# Patient Record
Sex: FEMALE | Race: WHITE | NOT HISPANIC OR LATINO | Employment: OTHER | URBAN - NONMETROPOLITAN AREA
[De-identification: names, ages, dates, MRNs, and addresses within clinical notes are randomized per-mention and may not be internally consistent; named-entity substitution may affect disease eponyms.]

---

## 2019-01-01 ENCOUNTER — READMISSION MANAGEMENT (OUTPATIENT)
Dept: CALL CENTER | Facility: HOSPITAL | Age: 84
End: 2019-01-01

## 2019-01-01 ENCOUNTER — APPOINTMENT (OUTPATIENT)
Dept: CARDIOLOGY | Facility: HOSPITAL | Age: 84
End: 2019-01-01

## 2019-01-01 ENCOUNTER — APPOINTMENT (OUTPATIENT)
Dept: MRI IMAGING | Facility: HOSPITAL | Age: 84
End: 2019-01-01

## 2019-01-01 ENCOUNTER — HOSPITAL ENCOUNTER (INPATIENT)
Facility: HOSPITAL | Age: 84
LOS: 7 days | Discharge: NURSING, REHAB OR LONG-TERM CARE | End: 2019-07-12
Attending: THORACIC SURGERY (CARDIOTHORACIC VASCULAR SURGERY) | Admitting: THORACIC SURGERY (CARDIOTHORACIC VASCULAR SURGERY)

## 2019-01-01 ENCOUNTER — OFFICE VISIT (OUTPATIENT)
Dept: CARDIAC SURGERY | Facility: CLINIC | Age: 84
End: 2019-01-01

## 2019-01-01 ENCOUNTER — APPOINTMENT (OUTPATIENT)
Dept: GENERAL RADIOLOGY | Facility: HOSPITAL | Age: 84
End: 2019-01-01

## 2019-01-01 ENCOUNTER — APPOINTMENT (OUTPATIENT)
Dept: CT IMAGING | Facility: HOSPITAL | Age: 84
End: 2019-01-01

## 2019-01-01 ENCOUNTER — ANESTHESIA (OUTPATIENT)
Dept: PERIOP | Facility: HOSPITAL | Age: 84
End: 2019-01-01

## 2019-01-01 ENCOUNTER — NURSE TRIAGE (OUTPATIENT)
Dept: CALL CENTER | Facility: HOSPITAL | Age: 84
End: 2019-01-01

## 2019-01-01 ENCOUNTER — HOSPITAL ENCOUNTER (EMERGENCY)
Facility: HOSPITAL | Age: 84
Discharge: HOME | End: 2019-10-20
Attending: EMERGENCY MEDICINE | Admitting: EMERGENCY MEDICINE

## 2019-01-01 ENCOUNTER — HOSPITAL ENCOUNTER (INPATIENT)
Facility: HOSPITAL | Age: 84
LOS: 1 days | Discharge: HOME | End: 2019-08-11
Attending: EMERGENCY MEDICINE | Admitting: INTERNAL MEDICINE

## 2019-01-01 ENCOUNTER — HOSPITAL ENCOUNTER (EMERGENCY)
Facility: HOSPITAL | Age: 84
Discharge: HOME | End: 2019-07-05
Attending: EMERGENCY MEDICINE | Admitting: EMERGENCY MEDICINE

## 2019-01-01 ENCOUNTER — ANESTHESIA EVENT (OUTPATIENT)
Dept: PERIOP | Facility: HOSPITAL | Age: 84
End: 2019-01-01

## 2019-01-01 VITALS
WEIGHT: 160 LBS | RESPIRATION RATE: 18 BRPM | HEIGHT: 61 IN | OXYGEN SATURATION: 93 % | TEMPERATURE: 97.7 F | BODY MASS INDEX: 30.21 KG/M2 | DIASTOLIC BLOOD PRESSURE: 80 MMHG | SYSTOLIC BLOOD PRESSURE: 179 MMHG | HEART RATE: 84 BPM

## 2019-01-01 VITALS
TEMPERATURE: 97.5 F | HEART RATE: 66 BPM | OXYGEN SATURATION: 94 % | WEIGHT: 145 LBS | DIASTOLIC BLOOD PRESSURE: 75 MMHG | SYSTOLIC BLOOD PRESSURE: 145 MMHG | BODY MASS INDEX: 25.69 KG/M2 | RESPIRATION RATE: 14 BRPM | HEIGHT: 63 IN

## 2019-01-01 VITALS
OXYGEN SATURATION: 99 % | HEIGHT: 62 IN | HEART RATE: 57 BPM | DIASTOLIC BLOOD PRESSURE: 67 MMHG | TEMPERATURE: 99 F | WEIGHT: 160 LBS | BODY MASS INDEX: 29.44 KG/M2 | SYSTOLIC BLOOD PRESSURE: 146 MMHG

## 2019-01-01 VITALS
BODY MASS INDEX: 28.41 KG/M2 | WEIGHT: 154.4 LBS | HEART RATE: 71 BPM | HEIGHT: 62 IN | RESPIRATION RATE: 18 BRPM | DIASTOLIC BLOOD PRESSURE: 59 MMHG | SYSTOLIC BLOOD PRESSURE: 172 MMHG | OXYGEN SATURATION: 95 % | TEMPERATURE: 97.7 F

## 2019-01-01 VITALS
HEIGHT: 63 IN | BODY MASS INDEX: 28.35 KG/M2 | RESPIRATION RATE: 16 BRPM | SYSTOLIC BLOOD PRESSURE: 171 MMHG | TEMPERATURE: 98 F | OXYGEN SATURATION: 97 % | DIASTOLIC BLOOD PRESSURE: 85 MMHG | WEIGHT: 160 LBS | HEART RATE: 55 BPM

## 2019-01-01 DIAGNOSIS — R20.2 LEFT HAND PARESTHESIA: ICD-10-CM
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
## 2019-01-01 DIAGNOSIS — N17.9 ACUTE RENAL FAILURE, UNSPECIFIED ACUTE RENAL FAILURE TYPE (HCC): Primary | ICD-10-CM

## 2019-01-01 DIAGNOSIS — Z74.09 IMPAIRED FUNCTIONAL MOBILITY, BALANCE, GAIT, AND ENDURANCE: ICD-10-CM

## 2019-01-01 DIAGNOSIS — N28.9 RENAL INSUFFICIENCY: ICD-10-CM

## 2019-01-01 DIAGNOSIS — Z78.9 IMPAIRED MOBILITY AND ADLS: ICD-10-CM

## 2019-01-01 DIAGNOSIS — I10 ELEVATED BLOOD PRESSURE READING WITH DIAGNOSIS OF HYPERTENSION: ICD-10-CM

## 2019-01-01 DIAGNOSIS — Z74.09 IMPAIRED MOBILITY AND ADLS: ICD-10-CM

## 2019-01-01 DIAGNOSIS — S09.93XA DENTAL TRAUMA, INITIAL ENCOUNTER: ICD-10-CM

## 2019-01-01 DIAGNOSIS — I73.9 CLAUDICATION OF RIGHT LOWER EXTREMITY (HCC): ICD-10-CM

## 2019-01-01 DIAGNOSIS — I99.8 ISCHEMIC LEG: Primary | ICD-10-CM

## 2019-01-01 DIAGNOSIS — S01.81XA FOREHEAD LACERATION, INITIAL ENCOUNTER: ICD-10-CM

## 2019-01-01 DIAGNOSIS — Z79.02 ANTIPLATELET OR ANTITHROMBOTIC LONG-TERM USE: ICD-10-CM
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
## 2019-01-01 DIAGNOSIS — M54.5 LOW BACK PAIN, UNSPECIFIED BACK PAIN LATERALITY, UNSPECIFIED CHRONICITY, WITH SCIATICA PRESENCE UNSPECIFIED: ICD-10-CM

## 2019-01-01 DIAGNOSIS — D64.9 ANEMIA, UNSPECIFIED TYPE: ICD-10-CM

## 2019-01-01 DIAGNOSIS — M79.604 RIGHT LEG PAIN: ICD-10-CM

## 2019-01-01 DIAGNOSIS — I70.209 ARTERIAL OCCLUSION, LOWER EXTREMITY (HCC): Primary | ICD-10-CM

## 2019-01-01 DIAGNOSIS — S00.83XA FACIAL CONTUSION, INITIAL ENCOUNTER: ICD-10-CM

## 2019-01-01 LAB
ABO + RH BLD: NORMAL
ABO GROUP BLD: NORMAL
ABO GROUP BLD: NORMAL
ALBUMIN SERPL-MCNC: 2.4 G/DL (ref 3.5–5.2)
ALBUMIN SERPL-MCNC: 3 G/DL (ref 3.5–5.2)
ALBUMIN SERPL-MCNC: 3.3 G/DL (ref 3.5–5.2)
ALBUMIN SERPL-MCNC: 3.5 G/DL (ref 3.5–5.2)
ALBUMIN SERPL-MCNC: 3.7 G/DL (ref 3.5–5.2)
ALBUMIN SERPL-MCNC: 3.9 G/DL (ref 3.5–5.2)
ALBUMIN/GLOB SERPL: 0.8 G/DL
ALBUMIN/GLOB SERPL: 1 G/DL
ALBUMIN/GLOB SERPL: 1.3 G/DL
ALBUMIN/GLOB SERPL: 1.3 G/DL
ALP SERPL-CCNC: 74 U/L (ref 39–117)
ALP SERPL-CCNC: 81 U/L (ref 39–117)
ALP SERPL-CCNC: 81 U/L (ref 39–117)
ALP SERPL-CCNC: 85 U/L (ref 39–117)
ALT SERPL W P-5'-P-CCNC: 24 U/L (ref 1–33)
ALT SERPL W P-5'-P-CCNC: 25 U/L (ref 1–33)
ALT SERPL W P-5'-P-CCNC: 7 U/L (ref 1–33)
ALT SERPL W P-5'-P-CCNC: 9 U/L (ref 1–33)
AMYLASE SERPL-CCNC: 196 U/L (ref 28–100)
ANION GAP SERPL CALCULATED.3IONS-SCNC: 12 MMOL/L (ref 5–15)
ANION GAP SERPL CALCULATED.3IONS-SCNC: 12 MMOL/L (ref 5–15)
ANION GAP SERPL CALCULATED.3IONS-SCNC: 13 MMOL/L (ref 5–15)
ANION GAP SERPL CALCULATED.3IONS-SCNC: 13 MMOL/L (ref 5–15)
ANION GAP SERPL CALCULATED.3IONS-SCNC: 14 MMOL/L (ref 5–15)
ANION GAP SERPL CALCULATED.3IONS-SCNC: 17 MMOL/L (ref 5–15)
APTT PPP: 27 SECONDS (ref 24–37)
AST SERPL-CCNC: 10 U/L (ref 1–32)
AST SERPL-CCNC: 15 U/L (ref 1–32)
AST SERPL-CCNC: 39 U/L (ref 1–32)
AST SERPL-CCNC: 47 U/L (ref 1–32)
BACTERIA SPEC AEROBE CULT: NORMAL
BACTERIA UR QL AUTO: ABNORMAL /HPF
BACTERIA UR QL AUTO: ABNORMAL /HPF
BASOPHILS # BLD AUTO: 0.02 10*3/MM3 (ref 0–0.2)
BASOPHILS # BLD AUTO: 0.03 10*3/MM3 (ref 0–0.2)
BASOPHILS # BLD AUTO: 0.06 10*3/MM3 (ref 0–0.2)
BASOPHILS NFR BLD AUTO: 0.1 % (ref 0–1.5)
BASOPHILS NFR BLD AUTO: 0.2 % (ref 0–1.5)
BASOPHILS NFR BLD AUTO: 0.3 % (ref 0–1.5)
BASOPHILS NFR BLD AUTO: 0.3 % (ref 0–1.5)
BASOPHILS NFR BLD AUTO: 0.8 % (ref 0–1.5)
BH BB BLOOD EXPIRATION DATE: NORMAL
BH BB BLOOD TYPE BARCODE: 6200
BH BB DISPENSE STATUS: NORMAL
BH BB PRODUCT CODE: NORMAL
BH BB UNIT NUMBER: NORMAL
BH CV ECHO MEAS - AO MAX PG (FULL): 9.3 MMHG
BH CV ECHO MEAS - AO MAX PG: 14.3 MMHG
BH CV ECHO MEAS - AO MEAN PG (FULL): 4.5 MMHG
BH CV ECHO MEAS - AO MEAN PG: 6.8 MMHG
BH CV ECHO MEAS - AO ROOT AREA (BSA CORRECTED): 1.6
BH CV ECHO MEAS - AO ROOT AREA: 5.8 CM^2
BH CV ECHO MEAS - AO ROOT DIAM: 2.7 CM
BH CV ECHO MEAS - AO V2 MAX: 189.1 CM/SEC
BH CV ECHO MEAS - AO V2 MEAN: 122.5 CM/SEC
BH CV ECHO MEAS - AO V2 VTI: 36.7 CM
BH CV ECHO MEAS - AVA(I,A): 1.7 CM^2
BH CV ECHO MEAS - AVA(I,D): 1.7 CM^2
BH CV ECHO MEAS - AVA(V,A): 1.6 CM^2
BH CV ECHO MEAS - AVA(V,D): 1.6 CM^2
BH CV ECHO MEAS - BSA(HAYCOCK): 1.8 M^2
BH CV ECHO MEAS - BSA: 1.7 M^2
BH CV ECHO MEAS - BSA: 1.7 M^2
BH CV ECHO MEAS - BSA: 1.8 M^2
BH CV ECHO MEAS - BZI_BMI: 28.3 KILOGRAMS/M^2
BH CV ECHO MEAS - BZI_BMI: 30.2 KILOGRAMS/M^2
BH CV ECHO MEAS - BZI_BMI: 30.2 KILOGRAMS/M^2
BH CV ECHO MEAS - BZI_METRIC_HEIGHT: 154.9 CM
BH CV ECHO MEAS - BZI_METRIC_HEIGHT: 154.9 CM
BH CV ECHO MEAS - BZI_METRIC_HEIGHT: 160 CM
BH CV ECHO MEAS - BZI_METRIC_WEIGHT: 72.6 KG
BH CV ECHO MEAS - EDV(CUBED): 65.7 ML
BH CV ECHO MEAS - EDV(MOD-SP4): 30 ML
BH CV ECHO MEAS - EDV(TEICH): 71.5 ML
BH CV ECHO MEAS - EF(CUBED): 65.1 %
BH CV ECHO MEAS - EF(MOD-BP): 60 %
BH CV ECHO MEAS - EF(MOD-SP4): 60 %
BH CV ECHO MEAS - EF(TEICH): 57.1 %
BH CV ECHO MEAS - ESV(CUBED): 22.9 ML
BH CV ECHO MEAS - ESV(MOD-SP4): 12 ML
BH CV ECHO MEAS - ESV(TEICH): 30.6 ML
BH CV ECHO MEAS - FS: 29.6 %
BH CV ECHO MEAS - IVS/LVPW: 1
BH CV ECHO MEAS - IVSD: 0.91 CM
BH CV ECHO MEAS - LA DIMENSION: 3.9 CM
BH CV ECHO MEAS - LA/AO: 1.4
BH CV ECHO MEAS - LAD MAJOR: 6.4 CM
BH CV ECHO MEAS - LAT PEAK E' VEL: 6.3 CM/SEC
BH CV ECHO MEAS - LATERAL E/E' RATIO: 17.8
BH CV ECHO MEAS - LV DIASTOLIC VOL/BSA (35-75): 17.5 ML/M^2
BH CV ECHO MEAS - LV MASS(C)D: 112.5 GRAMS
BH CV ECHO MEAS - LV MASS(C)DI: 65.5 GRAMS/M^2
BH CV ECHO MEAS - LV MAX PG: 5 MMHG
BH CV ECHO MEAS - LV MEAN PG: 2.3 MMHG
BH CV ECHO MEAS - LV SYSTOLIC VOL/BSA (12-30): 7 ML/M^2
BH CV ECHO MEAS - LV V1 MAX: 112 CM/SEC
BH CV ECHO MEAS - LV V1 MEAN: 70.9 CM/SEC
BH CV ECHO MEAS - LV V1 VTI: 23.6 CM
BH CV ECHO MEAS - LVIDD: 4 CM
BH CV ECHO MEAS - LVIDS: 2.8 CM
BH CV ECHO MEAS - LVLD AP4: 5.8 CM
BH CV ECHO MEAS - LVLS AP4: 5 CM
BH CV ECHO MEAS - LVOT AREA (M): 2.8 CM^2
BH CV ECHO MEAS - LVOT AREA: 2.7 CM^2
BH CV ECHO MEAS - LVOT DIAM: 1.9 CM
BH CV ECHO MEAS - LVPWD: 0.91 CM
BH CV ECHO MEAS - MED PEAK E' VEL: 6.5 CM/SEC
BH CV ECHO MEAS - MEDIAL E/E' RATIO: 17.2
BH CV ECHO MEAS - MV A MAX VEL: 117.2 CM/SEC
BH CV ECHO MEAS - MV DEC TIME: 0.25 SEC
BH CV ECHO MEAS - MV E MAX VEL: 113.5 CM/SEC
BH CV ECHO MEAS - MV E/A: 0.97
BH CV ECHO MEAS - MV MAX PG: 7.2 MMHG
BH CV ECHO MEAS - MV MEAN PG: 2.6 MMHG
BH CV ECHO MEAS - MV V2 MAX: 133.8 CM/SEC
BH CV ECHO MEAS - MV V2 MEAN: 74.2 CM/SEC
BH CV ECHO MEAS - MV V2 VTI: 50.4 CM
BH CV ECHO MEAS - MVA(VTI): 1.3 CM^2
BH CV ECHO MEAS - PA ACC SLOPE: 729.3 CM/SEC^2
BH CV ECHO MEAS - PA ACC TIME: 0.11 SEC
BH CV ECHO MEAS - PA MAX PG: 4 MMHG
BH CV ECHO MEAS - PA PR(ACCEL): 29.9 MMHG
BH CV ECHO MEAS - PA V2 MAX: 98.8 CM/SEC
BH CV ECHO MEAS - RAP SYSTOLE: 8 MMHG
BH CV ECHO MEAS - RVSP: 38 MMHG
BH CV ECHO MEAS - SI(AO): 123 ML/M^2
BH CV ECHO MEAS - SI(CUBED): 24.9 ML/M^2
BH CV ECHO MEAS - SI(LVOT): 36.9 ML/M^2
BH CV ECHO MEAS - SI(MOD-SP4): 10.5 ML/M^2
BH CV ECHO MEAS - SI(TEICH): 23.8 ML/M^2
BH CV ECHO MEAS - SV(AO): 211.4 ML
BH CV ECHO MEAS - SV(CUBED): 42.7 ML
BH CV ECHO MEAS - SV(LVOT): 63.4 ML
BH CV ECHO MEAS - SV(MOD-SP4): 18 ML
BH CV ECHO MEAS - SV(TEICH): 40.8 ML
BH CV ECHO MEAS - TAPSE (>1.6): 2.3 CM2
BH CV ECHO MEAS - TR MAX PG: 30 MMHG
BH CV ECHO MEAS - TR MAX VEL: 273.5 CM/SEC
BH CV ECHO MEASUREMENTS AVERAGE E/E' RATIO: 17.73
BH CV GRAFT BRACHIAL PRESSURE LEFT: 180 MMHG
BH CV GRAFT BRACHIAL PRESSURE RIGHT: 123 MMHG
BH CV GRAFT BRACHIAL PRESSURE RIGHT: 181 MMHG
BH CV LEA LEFT CFA PROX PSV: 419 CM/S
BH CV LEA LEFT PTA PRESSURE: 180 MMHG
BH CV LEA RIGHT ANT TIBIAL A DISTAL PSV: 0 CM/S
BH CV LEA RIGHT ANT TIBIAL A DISTAL PSV: 24.8 CM/S
BH CV LEA RIGHT ANT TIBIAL A MID PSV: 0 CM/S
BH CV LEA RIGHT ANT TIBIAL A MID PSV: 29.9 CM/S
BH CV LEA RIGHT ANT TIBIAL A PROX PSV: 0 CM/S
BH CV LEA RIGHT ANT TIBIAL A PROX PSV: 62.1 CM/S
BH CV LEA RIGHT CFA DISTAL EDV: 48.9 CM/S
BH CV LEA RIGHT CFA DISTAL PSV: 382 CM/S
BH CV LEA RIGHT CFA PROX EDV: 17 CM/S
BH CV LEA RIGHT CFA PROX PSV: 166 CM/S
BH CV LEA RIGHT CFA PROX PSV: 44 CM/S
BH CV LEA RIGHT DFA PROX EDV: 18 CM/S
BH CV LEA RIGHT DFA PROX PSV: 349 CM/S
BH CV LEA RIGHT DFA PROX PSV: 86 CM/S
BH CV LEA RIGHT DPA PRESSURE: 0 MMHG
BH CV LEA RIGHT DPA PRESSURE: 50 MMHG
BH CV LEA RIGHT PERONEAL  DISTAL PSV: 0 CM/S
BH CV LEA RIGHT PERONEAL  MID PSV: 0 CM/S
BH CV LEA RIGHT PERONEAL  MID PSV: 16.8 CM/S
BH CV LEA RIGHT PERONEAL  PROX PSV: 0 CM/S
BH CV LEA RIGHT POPITEAL A  DISTAL PSV: 0 CM/S
BH CV LEA RIGHT POPITEAL A  DISTAL PSV: 18.2 CM/S
BH CV LEA RIGHT POPITEAL A  MID PSV: 0 CM/S
BH CV LEA RIGHT POPITEAL A  PROX PSV: 0 CM/S
BH CV LEA RIGHT PTA DISTAL PSV: 0 CM/S
BH CV LEA RIGHT PTA DISTAL PSV: 26.3 CM/S
BH CV LEA RIGHT PTA MID PSV: 0 CM/S
BH CV LEA RIGHT PTA MID PSV: 30.8 CM/S
BH CV LEA RIGHT PTA PRESSURE: 0 MMHG
BH CV LEA RIGHT PTA PRESSURE: 62 MMHG
BH CV LEA RIGHT PTA PROX PSV: 0 CM/S
BH CV LEA RIGHT PTA PROX PSV: 32.1 CM/S
BH CV LEA RIGHT SFA DISTAL PSV: 0 CM/S
BH CV LEA RIGHT SFA MID PSV: 0 CM/S
BH CV LEA RIGHT SFA PROX EDV: 13 CM/S
BH CV LEA RIGHT SFA PROX PSV: 225 CM/S
BH CV LEA RIGHT SFA PROX PSV: 23 CM/S
BH CV LEA RIGHT TIBEOPERONEAL PSV: 0 CM/S
BH CV LOW VAS RIGHT GREATER SAPH AK VESSEL: 1
BH CV LOWER ARTERIAL LEFT ABI RATIO: 1
BH CV LOWER ARTERIAL RIGHT ABI RATIO: 0
BH CV LOWER ARTERIAL RIGHT ABI RATIO: 0.5
BH CV LOWER VASCULAR LEFT COMMON FEMORAL AUGMENT: NORMAL
BH CV LOWER VASCULAR LEFT COMMON FEMORAL AUGMENT: NORMAL
BH CV LOWER VASCULAR LEFT COMMON FEMORAL COMPRESS: NORMAL
BH CV LOWER VASCULAR LEFT COMMON FEMORAL COMPRESS: NORMAL
BH CV LOWER VASCULAR LEFT COMMON FEMORAL PHASIC: NORMAL
BH CV LOWER VASCULAR LEFT COMMON FEMORAL PHASIC: NORMAL
BH CV LOWER VASCULAR LEFT COMMON FEMORAL SPONT: NORMAL
BH CV LOWER VASCULAR LEFT COMMON FEMORAL SPONT: NORMAL
BH CV LOWER VASCULAR LEFT SAPHENOFEMORAL JUNCTION AUGMENT: NORMAL
BH CV LOWER VASCULAR LEFT SAPHENOFEMORAL JUNCTION COMPRESS: NORMAL
BH CV LOWER VASCULAR LEFT SAPHENOFEMORAL JUNCTION PHASIC: NORMAL
BH CV LOWER VASCULAR LEFT SAPHENOFEMORAL JUNCTION SPONT: NORMAL
BH CV LOWER VASCULAR RIGHT COMMON FEMORAL AUGMENT: NORMAL
BH CV LOWER VASCULAR RIGHT COMMON FEMORAL AUGMENT: NORMAL
BH CV LOWER VASCULAR RIGHT COMMON FEMORAL COMPRESS: NORMAL
BH CV LOWER VASCULAR RIGHT COMMON FEMORAL COMPRESS: NORMAL
BH CV LOWER VASCULAR RIGHT COMMON FEMORAL PHASIC: NORMAL
BH CV LOWER VASCULAR RIGHT COMMON FEMORAL PHASIC: NORMAL
BH CV LOWER VASCULAR RIGHT COMMON FEMORAL SPONT: NORMAL
BH CV LOWER VASCULAR RIGHT COMMON FEMORAL SPONT: NORMAL
BH CV LOWER VASCULAR RIGHT DISTAL FEMORAL AUGMENT: NORMAL
BH CV LOWER VASCULAR RIGHT DISTAL FEMORAL AUGMENT: NORMAL
BH CV LOWER VASCULAR RIGHT DISTAL FEMORAL COMPRESS: NORMAL
BH CV LOWER VASCULAR RIGHT DISTAL FEMORAL COMPRESS: NORMAL
BH CV LOWER VASCULAR RIGHT DISTAL FEMORAL PHASIC: NORMAL
BH CV LOWER VASCULAR RIGHT DISTAL FEMORAL PHASIC: NORMAL
BH CV LOWER VASCULAR RIGHT DISTAL FEMORAL SPONT: NORMAL
BH CV LOWER VASCULAR RIGHT DISTAL FEMORAL SPONT: NORMAL
BH CV LOWER VASCULAR RIGHT GASTRONEMIUS COMPRESS: NORMAL
BH CV LOWER VASCULAR RIGHT GASTRONEMIUS COMPRESS: NORMAL
BH CV LOWER VASCULAR RIGHT GREATER SAPH AK COMPRESS: NORMAL
BH CV LOWER VASCULAR RIGHT GREATER SAPH AK COMPRESS: NORMAL
BH CV LOWER VASCULAR RIGHT GREATER SAPH BK COMPRESS: NORMAL
BH CV LOWER VASCULAR RIGHT GREATER SAPH BK COMPRESS: NORMAL
BH CV LOWER VASCULAR RIGHT LESSER SAPH COMPRESS: NORMAL
BH CV LOWER VASCULAR RIGHT LESSER SAPH COMPRESS: NORMAL
BH CV LOWER VASCULAR RIGHT MID FEMORAL AUGMENT: NORMAL
BH CV LOWER VASCULAR RIGHT MID FEMORAL AUGMENT: NORMAL
BH CV LOWER VASCULAR RIGHT MID FEMORAL COMPRESS: NORMAL
BH CV LOWER VASCULAR RIGHT MID FEMORAL COMPRESS: NORMAL
BH CV LOWER VASCULAR RIGHT MID FEMORAL PHASIC: NORMAL
BH CV LOWER VASCULAR RIGHT MID FEMORAL PHASIC: NORMAL
BH CV LOWER VASCULAR RIGHT MID FEMORAL SPONT: NORMAL
BH CV LOWER VASCULAR RIGHT MID FEMORAL SPONT: NORMAL
BH CV LOWER VASCULAR RIGHT PERONEAL AUGMENT: NORMAL
BH CV LOWER VASCULAR RIGHT PERONEAL AUGMENT: NORMAL
BH CV LOWER VASCULAR RIGHT PERONEAL COMPRESS: NORMAL
BH CV LOWER VASCULAR RIGHT PERONEAL COMPRESS: NORMAL
BH CV LOWER VASCULAR RIGHT POPLITEAL AUGMENT: NORMAL
BH CV LOWER VASCULAR RIGHT POPLITEAL AUGMENT: NORMAL
BH CV LOWER VASCULAR RIGHT POPLITEAL COMPRESS: NORMAL
BH CV LOWER VASCULAR RIGHT POPLITEAL COMPRESS: NORMAL
BH CV LOWER VASCULAR RIGHT POPLITEAL PHASIC: NORMAL
BH CV LOWER VASCULAR RIGHT POPLITEAL PHASIC: NORMAL
BH CV LOWER VASCULAR RIGHT POPLITEAL SPONT: NORMAL
BH CV LOWER VASCULAR RIGHT POPLITEAL SPONT: NORMAL
BH CV LOWER VASCULAR RIGHT POSTERIOR TIBIAL AUGMENT: NORMAL
BH CV LOWER VASCULAR RIGHT POSTERIOR TIBIAL AUGMENT: NORMAL
BH CV LOWER VASCULAR RIGHT POSTERIOR TIBIAL COMPRESS: NORMAL
BH CV LOWER VASCULAR RIGHT POSTERIOR TIBIAL COMPRESS: NORMAL
BH CV LOWER VASCULAR RIGHT PROFUNDA FEMORAL AUGMENT: NORMAL
BH CV LOWER VASCULAR RIGHT PROFUNDA FEMORAL AUGMENT: NORMAL
BH CV LOWER VASCULAR RIGHT PROFUNDA FEMORAL COMPRESS: NORMAL
BH CV LOWER VASCULAR RIGHT PROFUNDA FEMORAL PHASIC: NORMAL
BH CV LOWER VASCULAR RIGHT PROFUNDA FEMORAL PHASIC: NORMAL
BH CV LOWER VASCULAR RIGHT PROFUNDA FEMORAL SPONT: NORMAL
BH CV LOWER VASCULAR RIGHT PROFUNDA FEMORAL SPONT: NORMAL
BH CV LOWER VASCULAR RIGHT PROXIMAL FEMORAL AUGMENT: NORMAL
BH CV LOWER VASCULAR RIGHT PROXIMAL FEMORAL AUGMENT: NORMAL
BH CV LOWER VASCULAR RIGHT PROXIMAL FEMORAL COMPRESS: NORMAL
BH CV LOWER VASCULAR RIGHT PROXIMAL FEMORAL COMPRESS: NORMAL
BH CV LOWER VASCULAR RIGHT PROXIMAL FEMORAL PHASIC: NORMAL
BH CV LOWER VASCULAR RIGHT PROXIMAL FEMORAL PHASIC: NORMAL
BH CV LOWER VASCULAR RIGHT PROXIMAL FEMORAL SPONT: NORMAL
BH CV LOWER VASCULAR RIGHT PROXIMAL FEMORAL SPONT: NORMAL
BH CV LOWER VASCULAR RIGHT SAPHENOFEMORAL JUNCTION AUGMENT: NORMAL
BH CV LOWER VASCULAR RIGHT SAPHENOFEMORAL JUNCTION COMPRESS: NORMAL
BH CV LOWER VASCULAR RIGHT SAPHENOFEMORAL JUNCTION PHASIC: NORMAL
BH CV LOWER VASCULAR RIGHT SAPHENOFEMORAL JUNCTION SPONT: NORMAL
BH CV VAS BP LEFT ARM: NORMAL MMHG
BH CV XLRA - RV BASE: 2.7 CM
BH CV XLRA - RV LENGTH: 5.6 CM
BH CV XLRA - RV MID: 1.9 CM
BH CV XLRA - TDI S': 14.8 CM/SEC
BILIRUB SERPL-MCNC: 0.4 MG/DL (ref 0.2–1.2)
BILIRUB SERPL-MCNC: 0.4 MG/DL (ref 0.2–1.2)
BILIRUB SERPL-MCNC: 0.8 MG/DL (ref 0.2–1.2)
BILIRUB SERPL-MCNC: 0.9 MG/DL (ref 0.2–1.2)
BILIRUB UR QL STRIP: NEGATIVE
BILIRUB UR QL STRIP: NEGATIVE
BLD GP AB SCN SERPL QL: NEGATIVE
BUN BLD-MCNC: 32 MG/DL (ref 8–23)
BUN BLD-MCNC: 33 MG/DL (ref 8–23)
BUN BLD-MCNC: 36 MG/DL (ref 8–23)
BUN BLD-MCNC: 38 MG/DL (ref 8–23)
BUN BLD-MCNC: 39 MG/DL (ref 8–23)
BUN BLD-MCNC: 40 MG/DL (ref 8–23)
BUN/CREAT SERPL: 14 (ref 7–25)
BUN/CREAT SERPL: 14.2 (ref 7–25)
BUN/CREAT SERPL: 18.1 (ref 7–25)
BUN/CREAT SERPL: 19.9 (ref 7–25)
BUN/CREAT SERPL: 21.1 (ref 7–25)
BUN/CREAT SERPL: 22.5 (ref 7–25)
BUN/CREAT SERPL: 22.8 (ref 7–25)
BUN/CREAT SERPL: 24.2 (ref 7–25)
BUN/CREAT SERPL: 25.2 (ref 7–25)
CA-I SERPL ISE-MCNC: 1.29 MMOL/L (ref 1.12–1.32)
CALCIUM SPEC-SCNC: 8.4 MG/DL (ref 8.2–9.6)
CALCIUM SPEC-SCNC: 8.6 MG/DL (ref 8.2–9.6)
CALCIUM SPEC-SCNC: 8.7 MG/DL (ref 8.2–9.6)
CALCIUM SPEC-SCNC: 8.7 MG/DL (ref 8.2–9.6)
CALCIUM SPEC-SCNC: 8.9 MG/DL (ref 8.2–9.6)
CALCIUM SPEC-SCNC: 9 MG/DL (ref 8.2–9.6)
CALCIUM SPEC-SCNC: 9.1 MG/DL (ref 8.2–9.6)
CALCIUM SPEC-SCNC: 9.2 MG/DL (ref 8.2–9.6)
CALCIUM SPEC-SCNC: 9.6 MG/DL (ref 8.2–9.6)
CHLORIDE SERPL-SCNC: 100 MMOL/L (ref 98–107)
CHLORIDE SERPL-SCNC: 101 MMOL/L (ref 98–107)
CHLORIDE SERPL-SCNC: 101 MMOL/L (ref 98–107)
CHLORIDE SERPL-SCNC: 103 MMOL/L (ref 98–107)
CHLORIDE SERPL-SCNC: 104 MMOL/L (ref 98–107)
CHLORIDE SERPL-SCNC: 104 MMOL/L (ref 98–107)
CHLORIDE SERPL-SCNC: 105 MMOL/L (ref 98–107)
CHLORIDE SERPL-SCNC: 106 MMOL/L (ref 98–107)
CHLORIDE SERPL-SCNC: 110 MMOL/L (ref 98–107)
CK SERPL-CCNC: 55 U/L (ref 20–180)
CK SERPL-CCNC: 68 U/L (ref 20–180)
CLARITY UR: CLEAR
CLARITY UR: CLEAR
CO2 SERPL-SCNC: 16 MMOL/L (ref 22–29)
CO2 SERPL-SCNC: 19 MMOL/L (ref 22–29)
CO2 SERPL-SCNC: 20 MMOL/L (ref 22–29)
CO2 SERPL-SCNC: 21 MMOL/L (ref 22–29)
CO2 SERPL-SCNC: 22 MMOL/L (ref 22–29)
CO2 SERPL-SCNC: 23 MMOL/L (ref 22–29)
CO2 SERPL-SCNC: 23 MMOL/L (ref 22–29)
COLOR UR: YELLOW
COLOR UR: YELLOW
CREAT BLD-MCNC: 1.27 MG/DL (ref 0.57–1)
CREAT BLD-MCNC: 1.49 MG/DL (ref 0.57–1)
CREAT BLD-MCNC: 1.67 MG/DL (ref 0.57–1)
CREAT BLD-MCNC: 1.69 MG/DL (ref 0.57–1)
CREAT BLD-MCNC: 1.82 MG/DL (ref 0.57–1)
CREAT BLD-MCNC: 1.85 MG/DL (ref 0.57–1)
CREAT BLD-MCNC: 1.91 MG/DL (ref 0.57–1)
CREAT BLD-MCNC: 2.67 MG/DL (ref 0.57–1)
CREAT BLD-MCNC: 2.86 MG/DL (ref 0.57–1)
D-LACTATE SERPL-SCNC: 1.3 MMOL/L (ref 0.5–2)
D-LACTATE SERPL-SCNC: 1.4 MMOL/L (ref 0.5–2)
D-LACTATE SERPL-SCNC: 1.6 MMOL/L (ref 0.5–2)
D-LACTATE SERPL-SCNC: 1.8 MMOL/L (ref 0.5–2)
D-LACTATE SERPL-SCNC: 1.9 MMOL/L (ref 0.5–2)
D-LACTATE SERPL-SCNC: 1.9 MMOL/L (ref 0.5–2)
D-LACTATE SERPL-SCNC: 2.6 MMOL/L (ref 0.5–2)
D-LACTATE SERPL-SCNC: 2.7 MMOL/L (ref 0.5–2)
D-LACTATE SERPL-SCNC: 3.4 MMOL/L (ref 0.5–2)
DEPRECATED RDW RBC AUTO: 48.3 FL (ref 37–54)
DEPRECATED RDW RBC AUTO: 48.7 FL (ref 37–54)
DEPRECATED RDW RBC AUTO: 49.1 FL (ref 37–54)
DEPRECATED RDW RBC AUTO: 49.1 FL (ref 37–54)
DEPRECATED RDW RBC AUTO: 49.5 FL (ref 37–54)
DEPRECATED RDW RBC AUTO: 49.8 FL (ref 37–54)
DEPRECATED RDW RBC AUTO: 53.1 FL (ref 37–54)
DEPRECATED RDW RBC AUTO: 55.4 FL (ref 37–54)
DEPRECATED RDW RBC AUTO: 55.8 FL (ref 37–54)
EOSINOPHIL # BLD AUTO: 0.03 10*3/MM3 (ref 0–0.4)
EOSINOPHIL # BLD AUTO: 0.09 10*3/MM3 (ref 0–0.4)
EOSINOPHIL # BLD AUTO: 0.17 10*3/MM3 (ref 0–0.4)
EOSINOPHIL # BLD AUTO: 0.19 10*3/MM3 (ref 0–0.4)
EOSINOPHIL # BLD AUTO: 1.01 10*3/MM3 (ref 0–0.4)
EOSINOPHIL NFR BLD AUTO: 0.3 % (ref 0.3–6.2)
EOSINOPHIL NFR BLD AUTO: 0.6 % (ref 0.3–6.2)
EOSINOPHIL NFR BLD AUTO: 1 % (ref 0.3–6.2)
EOSINOPHIL NFR BLD AUTO: 10.3 % (ref 0.3–6.2)
EOSINOPHIL NFR BLD AUTO: 2.5 % (ref 0.3–6.2)
ERYTHROCYTE [DISTWIDTH] IN BLOOD BY AUTOMATED COUNT: 18.1 % (ref 12.3–15.4)
ERYTHROCYTE [DISTWIDTH] IN BLOOD BY AUTOMATED COUNT: 18.2 % (ref 12.3–15.4)
ERYTHROCYTE [DISTWIDTH] IN BLOOD BY AUTOMATED COUNT: 18.3 % (ref 12.3–15.4)
ERYTHROCYTE [DISTWIDTH] IN BLOOD BY AUTOMATED COUNT: 18.4 % (ref 12.3–15.4)
ERYTHROCYTE [DISTWIDTH] IN BLOOD BY AUTOMATED COUNT: 18.4 % (ref 12.3–15.4)
ERYTHROCYTE [DISTWIDTH] IN BLOOD BY AUTOMATED COUNT: 18.6 % (ref 12.3–15.4)
ERYTHROCYTE [DISTWIDTH] IN BLOOD BY AUTOMATED COUNT: 18.9 % (ref 12.3–15.4)
ERYTHROCYTE [DISTWIDTH] IN BLOOD BY AUTOMATED COUNT: 20.3 % (ref 12.3–15.4)
ERYTHROCYTE [DISTWIDTH] IN BLOOD BY AUTOMATED COUNT: 20.4 % (ref 12.3–15.4)
GFR SERPL CREATININE-BSD FRML MDRD: 15 ML/MIN/1.73
GFR SERPL CREATININE-BSD FRML MDRD: 17 ML/MIN/1.73
GFR SERPL CREATININE-BSD FRML MDRD: 25 ML/MIN/1.73
GFR SERPL CREATININE-BSD FRML MDRD: 25 ML/MIN/1.73
GFR SERPL CREATININE-BSD FRML MDRD: 26 ML/MIN/1.73
GFR SERPL CREATININE-BSD FRML MDRD: 28 ML/MIN/1.73
GFR SERPL CREATININE-BSD FRML MDRD: 29 ML/MIN/1.73
GFR SERPL CREATININE-BSD FRML MDRD: 33 ML/MIN/1.73
GFR SERPL CREATININE-BSD FRML MDRD: 39 ML/MIN/1.73
GLOBULIN UR ELPH-MCNC: 2.7 GM/DL
GLOBULIN UR ELPH-MCNC: 3.1 GM/DL
GLOBULIN UR ELPH-MCNC: 3.1 GM/DL
GLOBULIN UR ELPH-MCNC: 3.2 GM/DL
GLUCOSE BLD-MCNC: 113 MG/DL (ref 65–99)
GLUCOSE BLD-MCNC: 125 MG/DL (ref 65–99)
GLUCOSE BLD-MCNC: 133 MG/DL (ref 65–99)
GLUCOSE BLD-MCNC: 142 MG/DL (ref 65–99)
GLUCOSE BLD-MCNC: 144 MG/DL (ref 65–99)
GLUCOSE BLD-MCNC: 147 MG/DL (ref 65–99)
GLUCOSE BLD-MCNC: 150 MG/DL (ref 65–99)
GLUCOSE BLD-MCNC: 182 MG/DL (ref 65–99)
GLUCOSE BLD-MCNC: 194 MG/DL (ref 65–99)
GLUCOSE BLDC GLUCOMTR-MCNC: 113 MG/DL (ref 70–130)
GLUCOSE BLDC GLUCOMTR-MCNC: 119 MG/DL (ref 70–130)
GLUCOSE BLDC GLUCOMTR-MCNC: 125 MG/DL (ref 70–130)
GLUCOSE BLDC GLUCOMTR-MCNC: 135 MG/DL (ref 70–130)
GLUCOSE BLDC GLUCOMTR-MCNC: 136 MG/DL (ref 70–130)
GLUCOSE BLDC GLUCOMTR-MCNC: 137 MG/DL (ref 70–130)
GLUCOSE BLDC GLUCOMTR-MCNC: 142 MG/DL (ref 70–130)
GLUCOSE BLDC GLUCOMTR-MCNC: 143 MG/DL (ref 70–130)
GLUCOSE BLDC GLUCOMTR-MCNC: 146 MG/DL (ref 70–130)
GLUCOSE BLDC GLUCOMTR-MCNC: 146 MG/DL (ref 70–130)
GLUCOSE BLDC GLUCOMTR-MCNC: 147 MG/DL (ref 70–130)
GLUCOSE BLDC GLUCOMTR-MCNC: 149 MG/DL (ref 70–130)
GLUCOSE BLDC GLUCOMTR-MCNC: 153 MG/DL (ref 70–130)
GLUCOSE BLDC GLUCOMTR-MCNC: 156 MG/DL (ref 70–130)
GLUCOSE BLDC GLUCOMTR-MCNC: 156 MG/DL (ref 70–130)
GLUCOSE BLDC GLUCOMTR-MCNC: 157 MG/DL (ref 70–130)
GLUCOSE BLDC GLUCOMTR-MCNC: 159 MG/DL (ref 70–130)
GLUCOSE UR STRIP-MCNC: NEGATIVE MG/DL
GLUCOSE UR STRIP-MCNC: NEGATIVE MG/DL
HBA1C MFR BLD: 5.5 % (ref 4.8–5.6)
HCT VFR BLD AUTO: 29.2 % (ref 34–46.6)
HCT VFR BLD AUTO: 29.4 % (ref 34–46.6)
HCT VFR BLD AUTO: 30.1 % (ref 34–46.6)
HCT VFR BLD AUTO: 30.2 % (ref 34–46.6)
HCT VFR BLD AUTO: 30.5 % (ref 34–46.6)
HCT VFR BLD AUTO: 31.3 % (ref 34–46.6)
HCT VFR BLD AUTO: 31.5 % (ref 34–46.6)
HCT VFR BLD AUTO: 31.9 % (ref 34–46.6)
HCT VFR BLD AUTO: 38.6 % (ref 34–46.6)
HGB BLD-MCNC: 10.3 G/DL (ref 12–15.9)
HGB BLD-MCNC: 8 G/DL (ref 12–15.9)
HGB BLD-MCNC: 8.4 G/DL (ref 12–15.9)
HGB BLD-MCNC: 8.5 G/DL (ref 12–15.9)
HGB BLD-MCNC: 8.6 G/DL (ref 12–15.9)
HGB BLD-MCNC: 8.8 G/DL (ref 12–15.9)
HGB BLD-MCNC: 9 G/DL (ref 12–15.9)
HGB UR QL STRIP.AUTO: NEGATIVE
HGB UR QL STRIP.AUTO: NEGATIVE
HOLD SPECIMEN: NORMAL
HYALINE CASTS UR QL AUTO: ABNORMAL /LPF
HYALINE CASTS UR QL AUTO: ABNORMAL /LPF
IMM GRANULOCYTES # BLD AUTO: 0.02 10*3/MM3 (ref 0–0.05)
IMM GRANULOCYTES # BLD AUTO: 0.03 10*3/MM3 (ref 0–0.05)
IMM GRANULOCYTES # BLD AUTO: 0.1 10*3/MM3 (ref 0–0.05)
IMM GRANULOCYTES # BLD AUTO: 0.11 10*3/MM3 (ref 0–0.05)
IMM GRANULOCYTES # BLD AUTO: 0.14 10*3/MM3 (ref 0–0.05)
IMM GRANULOCYTES NFR BLD AUTO: 0.3 % (ref 0–0.5)
IMM GRANULOCYTES NFR BLD AUTO: 0.3 % (ref 0–0.5)
IMM GRANULOCYTES NFR BLD AUTO: 0.7 % (ref 0–0.5)
IMM GRANULOCYTES NFR BLD AUTO: 0.9 % (ref 0–0.5)
IMM GRANULOCYTES NFR BLD AUTO: 0.9 % (ref 0–0.5)
INR PPP: 1.04 (ref 0.85–1.16)
INR PPP: 1.48 (ref 0.85–1.16)
IRON 24H UR-MRATE: 18 MCG/DL (ref 37–145)
IRON SATN MFR SERPL: 4 % (ref 20–50)
KETONES UR QL STRIP: NEGATIVE
KETONES UR QL STRIP: NEGATIVE
LEFT ATRIUM VOLUME INDEX: 34.3 ML/M^2
LEFT ATRIUM VOLUME: 59 ML
LEUKOCYTE ESTERASE UR QL STRIP.AUTO: ABNORMAL
LEUKOCYTE ESTERASE UR QL STRIP.AUTO: NEGATIVE
LIPASE SERPL-CCNC: 17 U/L (ref 13–60)
LYMPHOCYTES # BLD AUTO: 0.93 10*3/MM3 (ref 0.7–3.1)
LYMPHOCYTES # BLD AUTO: 1.1 10*3/MM3 (ref 0.7–3.1)
LYMPHOCYTES # BLD AUTO: 1.29 10*3/MM3 (ref 0.7–3.1)
LYMPHOCYTES # BLD AUTO: 1.44 10*3/MM3 (ref 0.7–3.1)
LYMPHOCYTES # BLD AUTO: 1.6 10*3/MM3 (ref 0.7–3.1)
LYMPHOCYTES NFR BLD AUTO: 13 % (ref 19.6–45.3)
LYMPHOCYTES NFR BLD AUTO: 13.2 % (ref 19.6–45.3)
LYMPHOCYTES NFR BLD AUTO: 20.9 % (ref 19.6–45.3)
LYMPHOCYTES NFR BLD AUTO: 5.8 % (ref 19.6–45.3)
LYMPHOCYTES NFR BLD AUTO: 6.7 % (ref 19.6–45.3)
MAGNESIUM SERPL-MCNC: 2 MG/DL (ref 1.7–2.3)
MAGNESIUM SERPL-MCNC: 2 MG/DL (ref 1.7–2.3)
MAGNESIUM SERPL-MCNC: 2.3 MG/DL (ref 1.7–2.3)
MAXIMAL PREDICTED HEART RATE: 128 BPM
MCH RBC QN AUTO: 20.7 PG (ref 26.6–33)
MCH RBC QN AUTO: 21 PG (ref 26.6–33)
MCH RBC QN AUTO: 21.1 PG (ref 26.6–33)
MCH RBC QN AUTO: 21.1 PG (ref 26.6–33)
MCH RBC QN AUTO: 21.2 PG (ref 26.6–33)
MCH RBC QN AUTO: 21.3 PG (ref 26.6–33)
MCH RBC QN AUTO: 21.4 PG (ref 26.6–33)
MCH RBC QN AUTO: 21.4 PG (ref 26.6–33)
MCH RBC QN AUTO: 21.6 PG (ref 26.6–33)
MCHC RBC AUTO-ENTMCNC: 26.7 G/DL (ref 31.5–35.7)
MCHC RBC AUTO-ENTMCNC: 27.4 G/DL (ref 31.5–35.7)
MCHC RBC AUTO-ENTMCNC: 27.6 G/DL (ref 31.5–35.7)
MCHC RBC AUTO-ENTMCNC: 28.1 G/DL (ref 31.5–35.7)
MCHC RBC AUTO-ENTMCNC: 28.2 G/DL (ref 31.5–35.7)
MCHC RBC AUTO-ENTMCNC: 28.5 G/DL (ref 31.5–35.7)
MCHC RBC AUTO-ENTMCNC: 28.6 G/DL (ref 31.5–35.7)
MCHC RBC AUTO-ENTMCNC: 28.6 G/DL (ref 31.5–35.7)
MCHC RBC AUTO-ENTMCNC: 28.9 G/DL (ref 31.5–35.7)
MCV RBC AUTO: 73.7 FL (ref 79–97)
MCV RBC AUTO: 73.8 FL (ref 79–97)
MCV RBC AUTO: 74.7 FL (ref 79–97)
MCV RBC AUTO: 74.8 FL (ref 79–97)
MCV RBC AUTO: 75.4 FL (ref 79–97)
MCV RBC AUTO: 75.6 FL (ref 79–97)
MCV RBC AUTO: 75.7 FL (ref 79–97)
MCV RBC AUTO: 77.4 FL (ref 79–97)
MCV RBC AUTO: 79.1 FL (ref 79–97)
MONOCYTES # BLD AUTO: 0.67 10*3/MM3 (ref 0.1–0.9)
MONOCYTES # BLD AUTO: 0.83 10*3/MM3 (ref 0.1–0.9)
MONOCYTES # BLD AUTO: 0.94 10*3/MM3 (ref 0.1–0.9)
MONOCYTES # BLD AUTO: 1.66 10*3/MM3 (ref 0.1–0.9)
MONOCYTES # BLD AUTO: 1.78 10*3/MM3 (ref 0.1–0.9)
MONOCYTES NFR BLD AUTO: 10.2 % (ref 5–12)
MONOCYTES NFR BLD AUTO: 11 % (ref 5–12)
MONOCYTES NFR BLD AUTO: 8.5 % (ref 5–12)
MONOCYTES NFR BLD AUTO: 8.5 % (ref 5–12)
MONOCYTES NFR BLD AUTO: 8.8 % (ref 5–12)
NEUTROPHILS # BLD AUTO: 13.19 10*3/MM3 (ref 1.7–7)
NEUTROPHILS # BLD AUTO: 13.2 10*3/MM3 (ref 1.7–7)
NEUTROPHILS # BLD AUTO: 5.1 10*3/MM3 (ref 1.7–7)
NEUTROPHILS # BLD AUTO: 6.57 10*3/MM3 (ref 1.7–7)
NEUTROPHILS # BLD AUTO: 8.54 10*3/MM3 (ref 1.7–7)
NEUTROPHILS NFR BLD AUTO: 66.7 % (ref 42.7–76)
NEUTROPHILS NFR BLD AUTO: 67.4 % (ref 42.7–76)
NEUTROPHILS NFR BLD AUTO: 77 % (ref 42.7–76)
NEUTROPHILS NFR BLD AUTO: 81 % (ref 42.7–76)
NEUTROPHILS NFR BLD AUTO: 81.8 % (ref 42.7–76)
NITRITE UR QL STRIP: NEGATIVE
NITRITE UR QL STRIP: NEGATIVE
NRBC BLD AUTO-RTO: 0 /100 WBC (ref 0–0.2)
NRBC BLD AUTO-RTO: 0.3 /100 WBC (ref 0–0.2)
PH UR STRIP.AUTO: <=5 [PH] (ref 5–8)
PH UR STRIP.AUTO: <=5 [PH] (ref 5–8)
PHOSPHATE SERPL-MCNC: 2.8 MG/DL (ref 2.5–4.5)
PHOSPHATE SERPL-MCNC: 3.8 MG/DL (ref 2.5–4.5)
PHOSPHATE SERPL-MCNC: 4.1 MG/DL (ref 2.5–4.5)
PHOSPHATE SERPL-MCNC: 5.2 MG/DL (ref 2.5–4.5)
PLATELET # BLD AUTO: 214 10*3/MM3 (ref 140–450)
PLATELET # BLD AUTO: 226 10*3/MM3 (ref 140–450)
PLATELET # BLD AUTO: 255 10*3/MM3 (ref 140–450)
PLATELET # BLD AUTO: 274 10*3/MM3 (ref 140–450)
PLATELET # BLD AUTO: 283 10*3/MM3 (ref 140–450)
PLATELET # BLD AUTO: 285 10*3/MM3 (ref 140–450)
PLATELET # BLD AUTO: 310 10*3/MM3 (ref 140–450)
PLATELET # BLD AUTO: 311 10*3/MM3 (ref 140–450)
PLATELET # BLD AUTO: 336 10*3/MM3 (ref 140–450)
PMV BLD AUTO: 11 FL (ref 6–12)
PMV BLD AUTO: 11.2 FL (ref 6–12)
PMV BLD AUTO: 11.2 FL (ref 6–12)
PMV BLD AUTO: 11.5 FL (ref 6–12)
PMV BLD AUTO: 11.5 FL (ref 6–12)
PMV BLD AUTO: 11.6 FL (ref 6–12)
PMV BLD AUTO: 11.6 FL (ref 6–12)
PMV BLD AUTO: 11.7 FL (ref 6–12)
PMV BLD AUTO: 11.9 FL (ref 6–12)
POTASSIUM BLD-SCNC: 4.1 MMOL/L (ref 3.5–5.2)
POTASSIUM BLD-SCNC: 4.3 MMOL/L (ref 3.5–5.2)
POTASSIUM BLD-SCNC: 4.4 MMOL/L (ref 3.5–5.2)
POTASSIUM BLD-SCNC: 4.5 MMOL/L (ref 3.5–5.2)
POTASSIUM BLD-SCNC: 4.7 MMOL/L (ref 3.5–5.2)
POTASSIUM BLD-SCNC: 5.1 MMOL/L (ref 3.5–5.2)
PROT SERPL-MCNC: 5.6 G/DL (ref 6–8.5)
PROT SERPL-MCNC: 6.1 G/DL (ref 6–8.5)
PROT SERPL-MCNC: 6.2 G/DL (ref 6–8.5)
PROT SERPL-MCNC: 7 G/DL (ref 6–8.5)
PROT UR QL STRIP: ABNORMAL
PROT UR QL STRIP: ABNORMAL
PROTHROMBIN TIME: 13.1 SECONDS (ref 11.2–14.3)
PROTHROMBIN TIME: 17.3 SECONDS (ref 11.2–14.3)
RBC # BLD AUTO: 3.86 10*6/MM3 (ref 3.77–5.28)
RBC # BLD AUTO: 3.93 10*6/MM3 (ref 3.77–5.28)
RBC # BLD AUTO: 4.03 10*6/MM3 (ref 3.77–5.28)
RBC # BLD AUTO: 4.09 10*6/MM3 (ref 3.77–5.28)
RBC # BLD AUTO: 4.12 10*6/MM3 (ref 3.77–5.28)
RBC # BLD AUTO: 4.14 10*6/MM3 (ref 3.77–5.28)
RBC # BLD AUTO: 4.15 10*6/MM3 (ref 3.77–5.28)
RBC # BLD AUTO: 4.16 10*6/MM3 (ref 3.77–5.28)
RBC # BLD AUTO: 4.88 10*6/MM3 (ref 3.77–5.28)
RBC # UR: ABNORMAL /HPF
RBC # UR: ABNORMAL /HPF
REF LAB TEST METHOD: ABNORMAL
REF LAB TEST METHOD: ABNORMAL
RH BLD: POSITIVE
RH BLD: POSITIVE
SODIUM BLD-SCNC: 134 MMOL/L (ref 136–145)
SODIUM BLD-SCNC: 135 MMOL/L (ref 136–145)
SODIUM BLD-SCNC: 137 MMOL/L (ref 136–145)
SODIUM BLD-SCNC: 141 MMOL/L (ref 136–145)
SODIUM BLD-SCNC: 142 MMOL/L (ref 136–145)
SP GR UR STRIP: 1.01 (ref 1–1.03)
SP GR UR STRIP: 1.02 (ref 1–1.03)
SQUAMOUS #/AREA URNS HPF: ABNORMAL /HPF
SQUAMOUS #/AREA URNS HPF: ABNORMAL /HPF
STRESS TARGET HR: 109 BPM
T&S EXPIRATION DATE: NORMAL
TIBC SERPL-MCNC: 443 MCG/DL (ref 298–536)
TRANSFERRIN SERPL-MCNC: 297 MG/DL (ref 200–360)
TROPONIN T SERPL-MCNC: <0.01 NG/ML (ref 0–0.03)
TSH SERPL DL<=0.05 MIU/L-ACNC: 5.06 MIU/ML (ref 0.27–4.2)
UNIT  ABO: NORMAL
UNIT  RH: NORMAL
UROBILINOGEN UR QL STRIP: ABNORMAL
UROBILINOGEN UR QL STRIP: ABNORMAL
WBC NRBC COR # BLD: 11.08 10*3/MM3 (ref 3.4–10.8)
WBC NRBC COR # BLD: 16.12 10*3/MM3 (ref 3.4–10.8)
WBC NRBC COR # BLD: 16.3 10*3/MM3 (ref 3.4–10.8)
WBC NRBC COR # BLD: 19.08 10*3/MM3 (ref 3.4–10.8)
WBC NRBC COR # BLD: 22.38 10*3/MM3 (ref 3.4–10.8)
WBC NRBC COR # BLD: 7.64 10*3/MM3 (ref 3.4–10.8)
WBC NRBC COR # BLD: 9.3 10*3/MM3 (ref 3.4–10.8)
WBC NRBC COR # BLD: 9.37 10*3/MM3 (ref 3.4–10.8)
WBC NRBC COR # BLD: 9.76 10*3/MM3 (ref 3.4–10.8)
WBC UR QL AUTO: ABNORMAL /HPF
WBC UR QL AUTO: ABNORMAL /HPF
WHOLE BLOOD HOLD SPECIMEN: NORMAL

## 2019-01-01 PROCEDURE — 25010000002 PIPERACILLIN SOD-TAZOBACTAM PER 1 G: Performed by: INTERNAL MEDICINE

## 2019-01-01 PROCEDURE — 85025 COMPLETE CBC W/AUTO DIFF WBC: CPT | Performed by: INTERNAL MEDICINE

## 2019-01-01 PROCEDURE — 82550 ASSAY OF CK (CPK): CPT | Performed by: INTERNAL MEDICINE

## 2019-01-01 PROCEDURE — 99232 SBSQ HOSP IP/OBS MODERATE 35: CPT | Performed by: INTERNAL MEDICINE

## 2019-01-01 PROCEDURE — 25010000003 CEFAZOLIN PER 500 MG: Performed by: ANESTHESIOLOGY

## 2019-01-01 PROCEDURE — 25010000002 HEPARIN (PORCINE) PER 1000 UNITS: Performed by: PHYSICIAN ASSISTANT

## 2019-01-01 PROCEDURE — 86900 BLOOD TYPING SEROLOGIC ABO: CPT | Performed by: THORACIC SURGERY (CARDIOTHORACIC VASCULAR SURGERY)

## 2019-01-01 PROCEDURE — 84443 ASSAY THYROID STIM HORMONE: CPT | Performed by: NURSE PRACTITIONER

## 2019-01-01 PROCEDURE — 25010000002 IOPAMIDOL 61 % SOLUTION: Performed by: THORACIC SURGERY (CARDIOTHORACIC VASCULAR SURGERY)

## 2019-01-01 PROCEDURE — 93005 ELECTROCARDIOGRAM TRACING: CPT | Performed by: NURSE PRACTITIONER

## 2019-01-01 PROCEDURE — 25010000002 HYDRALAZINE PER 20 MG: Performed by: ANESTHESIOLOGY

## 2019-01-01 PROCEDURE — 25010000002 PROPOFOL 10 MG/ML EMULSION: Performed by: ANESTHESIOLOGY

## 2019-01-01 PROCEDURE — 84100 ASSAY OF PHOSPHORUS: CPT | Performed by: NURSE PRACTITIONER

## 2019-01-01 PROCEDURE — 97161 PT EVAL LOW COMPLEX 20 MIN: CPT

## 2019-01-01 PROCEDURE — C1725 CATH, TRANSLUMIN NON-LASER: HCPCS | Performed by: THORACIC SURGERY (CARDIOTHORACIC VASCULAR SURGERY)

## 2019-01-01 PROCEDURE — 25010000003 LIDOCAINE 1 % SOLUTION: Performed by: ANESTHESIOLOGY

## 2019-01-01 PROCEDURE — 83605 ASSAY OF LACTIC ACID: CPT | Performed by: THORACIC SURGERY (CARDIOTHORACIC VASCULAR SURGERY)

## 2019-01-01 PROCEDURE — 85027 COMPLETE CBC AUTOMATED: CPT | Performed by: PHYSICIAN ASSISTANT

## 2019-01-01 PROCEDURE — 83605 ASSAY OF LACTIC ACID: CPT | Performed by: EMERGENCY MEDICINE

## 2019-01-01 PROCEDURE — 94799 UNLISTED PULMONARY SVC/PX: CPT

## 2019-01-01 PROCEDURE — 83735 ASSAY OF MAGNESIUM: CPT | Performed by: THORACIC SURGERY (CARDIOTHORACIC VASCULAR SURGERY)

## 2019-01-01 PROCEDURE — 83605 ASSAY OF LACTIC ACID: CPT | Performed by: INTERNAL MEDICINE

## 2019-01-01 PROCEDURE — 63710000001 INSULIN LISPRO (HUMAN) PER 5 UNITS: Performed by: INTERNAL MEDICINE

## 2019-01-01 PROCEDURE — 83690 ASSAY OF LIPASE: CPT | Performed by: INTERNAL MEDICINE

## 2019-01-01 PROCEDURE — 25010000002 NALOXONE PER 1 MG: Performed by: NURSE PRACTITIONER

## 2019-01-01 PROCEDURE — 97110 THERAPEUTIC EXERCISES: CPT

## 2019-01-01 PROCEDURE — 25010000002 MORPHINE PER 10 MG: Performed by: PHYSICIAN ASSISTANT

## 2019-01-01 PROCEDURE — 99024 POSTOP FOLLOW-UP VISIT: CPT | Performed by: PHYSICIAN ASSISTANT

## 2019-01-01 PROCEDURE — C1757 CATH, THROMBECTOMY/EMBOLECT: HCPCS | Performed by: THORACIC SURGERY (CARDIOTHORACIC VASCULAR SURGERY)

## 2019-01-01 PROCEDURE — 97530 THERAPEUTIC ACTIVITIES: CPT

## 2019-01-01 PROCEDURE — 80048 BASIC METABOLIC PNL TOTAL CA: CPT | Performed by: INTERNAL MEDICINE

## 2019-01-01 PROCEDURE — 99024 POSTOP FOLLOW-UP VISIT: CPT | Performed by: THORACIC SURGERY (CARDIOTHORACIC VASCULAR SURGERY)

## 2019-01-01 PROCEDURE — 86901 BLOOD TYPING SEROLOGIC RH(D): CPT

## 2019-01-01 PROCEDURE — 80053 COMPREHEN METABOLIC PANEL: CPT | Performed by: INTERNAL MEDICINE

## 2019-01-01 PROCEDURE — 82962 GLUCOSE BLOOD TEST: CPT

## 2019-01-01 PROCEDURE — 85007 BL SMEAR W/DIFF WBC COUNT: CPT | Performed by: EMERGENCY MEDICINE

## 2019-01-01 PROCEDURE — 70450 CT HEAD/BRAIN W/O DYE: CPT

## 2019-01-01 PROCEDURE — 93926 LOWER EXTREMITY STUDY: CPT | Performed by: INTERNAL MEDICINE

## 2019-01-01 PROCEDURE — 80048 BASIC METABOLIC PNL TOTAL CA: CPT | Performed by: PHYSICIAN ASSISTANT

## 2019-01-01 PROCEDURE — 85027 COMPLETE CBC AUTOMATED: CPT | Performed by: INTERNAL MEDICINE

## 2019-01-01 PROCEDURE — 93010 ELECTROCARDIOGRAM REPORT: CPT | Performed by: INTERNAL MEDICINE

## 2019-01-01 PROCEDURE — 047H3EZ DILATION OF RIGHT EXTERNAL ILIAC ARTERY WITH TWO INTRALUMINAL DEVICES, PERCUTANEOUS APPROACH: ICD-10-PCS | Performed by: THORACIC SURGERY (CARDIOTHORACIC VASCULAR SURGERY)

## 2019-01-01 PROCEDURE — 25010000002 ONDANSETRON PER 1 MG: Performed by: PHYSICIAN ASSISTANT

## 2019-01-01 PROCEDURE — 25010000002 HEPARIN (PORCINE) PER 1000 UNITS: Performed by: THORACIC SURGERY (CARDIOTHORACIC VASCULAR SURGERY)

## 2019-01-01 PROCEDURE — 99233 SBSQ HOSP IP/OBS HIGH 50: CPT | Performed by: INTERNAL MEDICINE

## 2019-01-01 PROCEDURE — 85025 COMPLETE CBC W/AUTO DIFF WBC: CPT | Performed by: EMERGENCY MEDICINE

## 2019-01-01 PROCEDURE — 93005 ELECTROCARDIOGRAM TRACING: CPT | Performed by: EMERGENCY MEDICINE

## 2019-01-01 PROCEDURE — 25010000002 SUCCINYLCHOLINE PER 20 MG: Performed by: ANESTHESIOLOGY

## 2019-01-01 PROCEDURE — C1874 STENT, COATED/COV W/DEL SYS: HCPCS | Performed by: THORACIC SURGERY (CARDIOTHORACIC VASCULAR SURGERY)

## 2019-01-01 PROCEDURE — 25010000002 ONDANSETRON PER 1 MG: Performed by: EMERGENCY MEDICINE

## 2019-01-01 PROCEDURE — 82330 ASSAY OF CALCIUM: CPT | Performed by: INTERNAL MEDICINE

## 2019-01-01 PROCEDURE — 97116 GAIT TRAINING THERAPY: CPT

## 2019-01-01 PROCEDURE — C1894 INTRO/SHEATH, NON-LASER: HCPCS | Performed by: THORACIC SURGERY (CARDIOTHORACIC VASCULAR SURGERY)

## 2019-01-01 PROCEDURE — 86900 BLOOD TYPING SEROLOGIC ABO: CPT

## 2019-01-01 PROCEDURE — 99231 SBSQ HOSP IP/OBS SF/LOW 25: CPT | Performed by: THORACIC SURGERY (CARDIOTHORACIC VASCULAR SURGERY)

## 2019-01-01 PROCEDURE — 84466 ASSAY OF TRANSFERRIN: CPT | Performed by: INTERNAL MEDICINE

## 2019-01-01 PROCEDURE — C1769 GUIDE WIRE: HCPCS | Performed by: THORACIC SURGERY (CARDIOTHORACIC VASCULAR SURGERY)

## 2019-01-01 PROCEDURE — 86923 COMPATIBILITY TEST ELECTRIC: CPT

## 2019-01-01 PROCEDURE — 99239 HOSP IP/OBS DSCHRG MGMT >30: CPT | Performed by: INTERNAL MEDICINE

## 2019-01-01 PROCEDURE — 63710000001 PREDNISONE PER 5 MG: Performed by: PHYSICIAN ASSISTANT

## 2019-01-01 PROCEDURE — 74018 RADEX ABDOMEN 1 VIEW: CPT

## 2019-01-01 PROCEDURE — 83735 ASSAY OF MAGNESIUM: CPT | Performed by: INTERNAL MEDICINE

## 2019-01-01 PROCEDURE — 93005 ELECTROCARDIOGRAM TRACING: CPT | Performed by: PHYSICIAN ASSISTANT

## 2019-01-01 PROCEDURE — 74176 CT ABD & PELVIS W/O CONTRAST: CPT

## 2019-01-01 PROCEDURE — 99238 HOSP IP/OBS DSCHRG MGMT 30/<: CPT | Performed by: PHYSICIAN ASSISTANT

## 2019-01-01 PROCEDURE — 85730 THROMBOPLASTIN TIME PARTIAL: CPT | Performed by: EMERGENCY MEDICINE

## 2019-01-01 PROCEDURE — 82150 ASSAY OF AMYLASE: CPT | Performed by: INTERNAL MEDICINE

## 2019-01-01 PROCEDURE — 37221 PR REVSC OPN/PRQ ILIAC ART W/STNT PLMT & ANGIOPLSTY: CPT | Performed by: THORACIC SURGERY (CARDIOTHORACIC VASCULAR SURGERY)

## 2019-01-01 PROCEDURE — 99223 1ST HOSP IP/OBS HIGH 75: CPT | Performed by: INTERNAL MEDICINE

## 2019-01-01 PROCEDURE — 99284 EMERGENCY DEPT VISIT MOD MDM: CPT

## 2019-01-01 PROCEDURE — 93971 EXTREMITY STUDY: CPT | Performed by: INTERNAL MEDICINE

## 2019-01-01 PROCEDURE — 25010000002 FENTANYL CITRATE (PF) 100 MCG/2ML SOLUTION: Performed by: ANESTHESIOLOGY

## 2019-01-01 PROCEDURE — C1887 CATHETER, GUIDING: HCPCS | Performed by: THORACIC SURGERY (CARDIOTHORACIC VASCULAR SURGERY)

## 2019-01-01 PROCEDURE — 37223 PR REVSC OPN/PRQ ILIAC ART W/STNT & ANGIOP IPSILATL: CPT | Performed by: THORACIC SURGERY (CARDIOTHORACIC VASCULAR SURGERY)

## 2019-01-01 PROCEDURE — 82550 ASSAY OF CK (CPK): CPT | Performed by: EMERGENCY MEDICINE

## 2019-01-01 PROCEDURE — 25010000002 CEFTRIAXONE PER 250 MG: Performed by: INTERNAL MEDICINE

## 2019-01-01 PROCEDURE — P9016 RBC LEUKOCYTES REDUCED: HCPCS

## 2019-01-01 PROCEDURE — 93306 TTE W/DOPPLER COMPLETE: CPT

## 2019-01-01 PROCEDURE — 25010000002 ONDANSETRON PER 1 MG

## 2019-01-01 PROCEDURE — 99285 EMERGENCY DEPT VISIT HI MDM: CPT

## 2019-01-01 PROCEDURE — 83036 HEMOGLOBIN GLYCOSYLATED A1C: CPT | Performed by: NURSE PRACTITIONER

## 2019-01-01 PROCEDURE — 86850 RBC ANTIBODY SCREEN: CPT | Performed by: THORACIC SURGERY (CARDIOTHORACIC VASCULAR SURGERY)

## 2019-01-01 PROCEDURE — 97166 OT EVAL MOD COMPLEX 45 MIN: CPT

## 2019-01-01 PROCEDURE — 83540 ASSAY OF IRON: CPT | Performed by: INTERNAL MEDICINE

## 2019-01-01 PROCEDURE — 80053 COMPREHEN METABOLIC PANEL: CPT | Performed by: EMERGENCY MEDICINE

## 2019-01-01 PROCEDURE — 81001 URINALYSIS AUTO W/SCOPE: CPT | Performed by: INTERNAL MEDICINE

## 2019-01-01 PROCEDURE — 99221 1ST HOSP IP/OBS SF/LOW 40: CPT | Performed by: THORACIC SURGERY (CARDIOTHORACIC VASCULAR SURGERY)

## 2019-01-01 PROCEDURE — 85610 PROTHROMBIN TIME: CPT | Performed by: EMERGENCY MEDICINE

## 2019-01-01 PROCEDURE — 97535 SELF CARE MNGMENT TRAINING: CPT

## 2019-01-01 PROCEDURE — 84100 ASSAY OF PHOSPHORUS: CPT | Performed by: INTERNAL MEDICINE

## 2019-01-01 PROCEDURE — 85025 COMPLETE CBC W/AUTO DIFF WBC: CPT | Performed by: PHYSICIAN ASSISTANT

## 2019-01-01 PROCEDURE — 71045 X-RAY EXAM CHEST 1 VIEW: CPT

## 2019-01-01 PROCEDURE — 81001 URINALYSIS AUTO W/SCOPE: CPT | Performed by: EMERGENCY MEDICINE

## 2019-01-01 PROCEDURE — 83735 ASSAY OF MAGNESIUM: CPT | Performed by: NURSE PRACTITIONER

## 2019-01-01 PROCEDURE — 80069 RENAL FUNCTION PANEL: CPT | Performed by: INTERNAL MEDICINE

## 2019-01-01 PROCEDURE — 93926 LOWER EXTREMITY STUDY: CPT

## 2019-01-01 PROCEDURE — 93971 EXTREMITY STUDY: CPT

## 2019-01-01 PROCEDURE — 87086 URINE CULTURE/COLONY COUNT: CPT | Performed by: INTERNAL MEDICINE

## 2019-01-01 PROCEDURE — 25010000002 HEPARIN (PORCINE) PER 1000 UNITS: Performed by: EMERGENCY MEDICINE

## 2019-01-01 PROCEDURE — 25010000002 HYDROMORPHONE 1 MG/ML SOLUTION: Performed by: NURSE PRACTITIONER

## 2019-01-01 PROCEDURE — 99283 EMERGENCY DEPT VISIT LOW MDM: CPT

## 2019-01-01 PROCEDURE — 047C3DZ DILATION OF RIGHT COMMON ILIAC ARTERY WITH INTRALUMINAL DEVICE, PERCUTANEOUS APPROACH: ICD-10-PCS | Performed by: THORACIC SURGERY (CARDIOTHORACIC VASCULAR SURGERY)

## 2019-01-01 PROCEDURE — 84484 ASSAY OF TROPONIN QUANT: CPT | Performed by: THORACIC SURGERY (CARDIOTHORACIC VASCULAR SURGERY)

## 2019-01-01 PROCEDURE — 93005 ELECTROCARDIOGRAM TRACING: CPT | Performed by: THORACIC SURGERY (CARDIOTHORACIC VASCULAR SURGERY)

## 2019-01-01 PROCEDURE — 86901 BLOOD TYPING SEROLOGIC RH(D): CPT | Performed by: THORACIC SURGERY (CARDIOTHORACIC VASCULAR SURGERY)

## 2019-01-01 PROCEDURE — 97165 OT EVAL LOW COMPLEX 30 MIN: CPT

## 2019-01-01 PROCEDURE — 36430 TRANSFUSION BLD/BLD COMPNT: CPT

## 2019-01-01 PROCEDURE — 63710000001 PREDNISONE PER 1 MG: Performed by: PHYSICIAN ASSISTANT

## 2019-01-01 PROCEDURE — 97162 PT EVAL MOD COMPLEX 30 MIN: CPT

## 2019-01-01 DEVICE — IMPLANTABLE DEVICE: Type: IMPLANTABLE DEVICE | Status: FUNCTIONAL

## 2019-01-01 DEVICE — STENTGR ENDOPROSTH VIABAHN VBX EXP 7F 7X11X79MM 80CM: Type: IMPLANTABLE DEVICE | Status: FUNCTIONAL

## 2019-01-01 RX ORDER — CHOLECALCIFEROL (VITAMIN D3) 125 MCG
5 CAPSULE ORAL NIGHTLY PRN
Status: DISCONTINUED | OUTPATIENT
Start: 2019-01-01 | End: 2019-01-01 | Stop reason: HOSPADM

## 2019-01-01 RX ORDER — DEXTROSE MONOHYDRATE 25 G/50ML
25 INJECTION, SOLUTION INTRAVENOUS
Status: DISCONTINUED | OUTPATIENT
Start: 2019-01-01 | End: 2019-01-01

## 2019-01-01 RX ORDER — SACCHAROMYCES BOULARDII 250 MG
250 CAPSULE ORAL 2 TIMES DAILY
Status: DISCONTINUED | OUTPATIENT
Start: 2019-01-01 | End: 2019-01-01 | Stop reason: HOSPADM

## 2019-01-01 RX ORDER — AMLODIPINE BESYLATE 5 MG/1
5 TABLET ORAL
Status: DISCONTINUED | OUTPATIENT
Start: 2019-01-01 | End: 2019-01-01

## 2019-01-01 RX ORDER — MORPHINE SULFATE 4 MG/ML
2 INJECTION, SOLUTION INTRAMUSCULAR; INTRAVENOUS ONCE
Status: DISCONTINUED | OUTPATIENT
Start: 2019-01-01 | End: 2019-01-01

## 2019-01-01 RX ORDER — SODIUM CHLORIDE 0.9 % (FLUSH) 0.9 %
10 SYRINGE (ML) INJECTION AS NEEDED
Status: DISCONTINUED | OUTPATIENT
Start: 2019-01-01 | End: 2019-01-01 | Stop reason: HOSPADM

## 2019-01-01 RX ORDER — NALOXONE HCL 0.4 MG/ML
0.4 VIAL (ML) INJECTION
Status: DISCONTINUED | OUTPATIENT
Start: 2019-01-01 | End: 2019-01-01

## 2019-01-01 RX ORDER — CLOPIDOGREL BISULFATE 75 MG/1
75 TABLET ORAL DAILY
Qty: 30 TABLET | Refills: 5 | Status: SHIPPED | OUTPATIENT
Start: 2019-01-01

## 2019-01-01 RX ORDER — LATANOPROST 50 UG/ML
1 SOLUTION/ DROPS OPHTHALMIC NIGHTLY
Status: DISCONTINUED | OUTPATIENT
Start: 2019-01-01 | End: 2019-01-01 | Stop reason: HOSPADM

## 2019-01-01 RX ORDER — FENTANYL CITRATE 50 UG/ML
INJECTION, SOLUTION INTRAMUSCULAR; INTRAVENOUS AS NEEDED
Status: DISCONTINUED | OUTPATIENT
Start: 2019-01-01 | End: 2019-01-01 | Stop reason: SURG

## 2019-01-01 RX ORDER — AMLODIPINE BESYLATE 5 MG/1
5 TABLET ORAL 2 TIMES DAILY
Qty: 60 TABLET | Refills: 5 | Status: SHIPPED | OUTPATIENT
Start: 2019-01-01 | End: 2019-01-01

## 2019-01-01 RX ORDER — ASPIRIN 325 MG
325 TABLET, DELAYED RELEASE (ENTERIC COATED) ORAL DAILY
Status: DISCONTINUED | OUTPATIENT
Start: 2019-01-01 | End: 2019-01-01 | Stop reason: HOSPADM

## 2019-01-01 RX ORDER — GABAPENTIN 300 MG/1
300 CAPSULE ORAL EVERY 8 HOURS SCHEDULED
Status: DISCONTINUED | OUTPATIENT
Start: 2019-01-01 | End: 2019-01-01 | Stop reason: HOSPADM

## 2019-01-01 RX ORDER — HYDROCODONE BITARTRATE AND ACETAMINOPHEN 5; 325 MG/1; MG/1
1 TABLET ORAL EVERY 6 HOURS PRN
Status: DISCONTINUED | OUTPATIENT
Start: 2019-01-01 | End: 2019-01-01

## 2019-01-01 RX ORDER — SODIUM CHLORIDE, SODIUM LACTATE, POTASSIUM CHLORIDE, CALCIUM CHLORIDE 600; 310; 30; 20 MG/100ML; MG/100ML; MG/100ML; MG/100ML
9 INJECTION, SOLUTION INTRAVENOUS CONTINUOUS PRN
Status: CANCELLED | OUTPATIENT
Start: 2019-01-01

## 2019-01-01 RX ORDER — AMLODIPINE BESYLATE 5 MG/1
5 TABLET ORAL 2 TIMES DAILY
Status: DISCONTINUED | OUTPATIENT
Start: 2019-01-01 | End: 2019-01-01

## 2019-01-01 RX ORDER — PROPOFOL 10 MG/ML
VIAL (ML) INTRAVENOUS AS NEEDED
Status: DISCONTINUED | OUTPATIENT
Start: 2019-01-01 | End: 2019-01-01 | Stop reason: SURG

## 2019-01-01 RX ORDER — SODIUM CHLORIDE 0.9 % (FLUSH) 0.9 %
3 SYRINGE (ML) INJECTION EVERY 12 HOURS SCHEDULED
Status: CANCELLED | OUTPATIENT
Start: 2019-01-01

## 2019-01-01 RX ORDER — SODIUM CHLORIDE 0.9 % (FLUSH) 0.9 %
3-10 SYRINGE (ML) INJECTION AS NEEDED
Status: DISCONTINUED | OUTPATIENT
Start: 2019-01-01 | End: 2019-01-01 | Stop reason: HOSPADM

## 2019-01-01 RX ORDER — HYDROMORPHONE HYDROCHLORIDE 1 MG/ML
0.5 INJECTION, SOLUTION INTRAMUSCULAR; INTRAVENOUS; SUBCUTANEOUS
Status: DISCONTINUED | OUTPATIENT
Start: 2019-01-01 | End: 2019-01-01 | Stop reason: HOSPADM

## 2019-01-01 RX ORDER — ADENOSINE 3 MG/ML
INJECTION, SOLUTION INTRAVENOUS
Status: DISPENSED
Start: 2019-01-01 | End: 2019-01-01

## 2019-01-01 RX ORDER — SODIUM CHLORIDE 0.9 % (FLUSH) 0.9 %
1-10 SYRINGE (ML) INJECTION AS NEEDED
Status: CANCELLED | OUTPATIENT
Start: 2019-01-01

## 2019-01-01 RX ORDER — LIDOCAINE HYDROCHLORIDE 10 MG/ML
INJECTION, SOLUTION INFILTRATION; PERINEURAL AS NEEDED
Status: DISCONTINUED | OUTPATIENT
Start: 2019-01-01 | End: 2019-01-01 | Stop reason: SURG

## 2019-01-01 RX ORDER — HEPARIN SODIUM 1000 [USP'U]/ML
10000 INJECTION, SOLUTION INTRAVENOUS; SUBCUTANEOUS ONCE
Status: COMPLETED | OUTPATIENT
Start: 2019-01-01 | End: 2019-01-01

## 2019-01-01 RX ORDER — LORAZEPAM 2 MG/ML
1 INJECTION INTRAMUSCULAR ONCE
Status: DISCONTINUED | OUTPATIENT
Start: 2019-01-01 | End: 2019-01-01 | Stop reason: HOSPADM

## 2019-01-01 RX ORDER — HYDROCHLOROTHIAZIDE 25 MG/1
25 TABLET ORAL DAILY
COMMUNITY
End: 2019-01-01 | Stop reason: HOSPADM

## 2019-01-01 RX ORDER — HYDRALAZINE HYDROCHLORIDE 20 MG/ML
10 INJECTION INTRAMUSCULAR; INTRAVENOUS ONCE
Status: COMPLETED | OUTPATIENT
Start: 2019-01-01 | End: 2019-01-01

## 2019-01-01 RX ORDER — TRAMADOL HYDROCHLORIDE 50 MG/1
50 TABLET ORAL EVERY 12 HOURS PRN
Status: DISCONTINUED | OUTPATIENT
Start: 2019-01-01 | End: 2019-01-01 | Stop reason: HOSPADM

## 2019-01-01 RX ORDER — ATENOLOL 50 MG/1
50 TABLET ORAL DAILY
Status: DISCONTINUED | OUTPATIENT
Start: 2019-01-01 | End: 2019-01-01

## 2019-01-01 RX ORDER — HYDROCODONE BITARTRATE AND ACETAMINOPHEN 5; 325 MG/1; MG/1
1 TABLET ORAL ONCE
Status: COMPLETED | OUTPATIENT
Start: 2019-01-01 | End: 2019-01-01

## 2019-01-01 RX ORDER — MORPHINE SULFATE 2 MG/ML
1 INJECTION, SOLUTION INTRAMUSCULAR; INTRAVENOUS EVERY 4 HOURS PRN
Status: DISCONTINUED | OUTPATIENT
Start: 2019-01-01 | End: 2019-01-01

## 2019-01-01 RX ORDER — ASPIRIN 325 MG
325 TABLET ORAL DAILY
Status: DISCONTINUED | OUTPATIENT
Start: 2019-01-01 | End: 2019-01-01 | Stop reason: HOSPADM

## 2019-01-01 RX ORDER — OXYCODONE HYDROCHLORIDE AND ACETAMINOPHEN 5; 325 MG/1; MG/1
1 TABLET ORAL ONCE
Status: DISCONTINUED | OUTPATIENT
Start: 2019-01-01 | End: 2019-01-01

## 2019-01-01 RX ORDER — SODIUM CHLORIDE 0.9 % (FLUSH) 0.9 %
10 SYRINGE (ML) INJECTION AS NEEDED
Status: DISCONTINUED | OUTPATIENT
Start: 2019-01-01 | End: 2019-01-01

## 2019-01-01 RX ORDER — FAMOTIDINE 20 MG/1
20 TABLET, FILM COATED ORAL
Status: DISCONTINUED | OUTPATIENT
Start: 2019-01-01 | End: 2019-01-01 | Stop reason: HOSPADM

## 2019-01-01 RX ORDER — ROCURONIUM BROMIDE 10 MG/ML
INJECTION, SOLUTION INTRAVENOUS AS NEEDED
Status: DISCONTINUED | OUTPATIENT
Start: 2019-01-01 | End: 2019-01-01 | Stop reason: SURG

## 2019-01-01 RX ORDER — ONDANSETRON 4 MG/1
4 TABLET, FILM COATED ORAL EVERY 6 HOURS PRN
Status: DISCONTINUED | OUTPATIENT
Start: 2019-01-01 | End: 2019-01-01

## 2019-01-01 RX ORDER — ASPIRIN 325 MG
325 TABLET ORAL DAILY
COMMUNITY

## 2019-01-01 RX ORDER — ATENOLOL 50 MG/1
50 TABLET ORAL DAILY
COMMUNITY

## 2019-01-01 RX ORDER — GABAPENTIN 300 MG/1
300 CAPSULE ORAL EVERY 12 HOURS PRN
Status: DISCONTINUED | OUTPATIENT
Start: 2019-01-01 | End: 2019-01-01

## 2019-01-01 RX ORDER — FAMOTIDINE 20 MG/1
20 TABLET, FILM COATED ORAL
Status: CANCELLED | OUTPATIENT
Start: 2019-01-01

## 2019-01-01 RX ORDER — ATENOLOL 25 MG/1
25 TABLET ORAL
Status: DISCONTINUED | OUTPATIENT
Start: 2019-01-01 | End: 2019-01-01 | Stop reason: HOSPADM

## 2019-01-01 RX ORDER — SUCCINYLCHOLINE CHLORIDE 20 MG/ML
INJECTION INTRAMUSCULAR; INTRAVENOUS AS NEEDED
Status: DISCONTINUED | OUTPATIENT
Start: 2019-01-01 | End: 2019-01-01 | Stop reason: SURG

## 2019-01-01 RX ORDER — ACETAMINOPHEN 325 MG/1
650 TABLET ORAL EVERY 4 HOURS PRN
Status: DISCONTINUED | OUTPATIENT
Start: 2019-01-01 | End: 2019-01-01 | Stop reason: HOSPADM

## 2019-01-01 RX ORDER — CEFUROXIME AXETIL 250 MG/1
250 TABLET ORAL 2 TIMES DAILY
Qty: 6 TABLET | Refills: 0 | Status: SHIPPED | OUTPATIENT
Start: 2019-01-01 | End: 2019-01-01

## 2019-01-01 RX ORDER — FLUTICASONE PROPIONATE 50 MCG
2 SPRAY, SUSPENSION (ML) NASAL DAILY PRN
COMMUNITY

## 2019-01-01 RX ORDER — LIDOCAINE HYDROCHLORIDE 10 MG/ML
0.5 INJECTION, SOLUTION EPIDURAL; INFILTRATION; INTRACAUDAL; PERINEURAL ONCE AS NEEDED
Status: CANCELLED | OUTPATIENT
Start: 2019-01-01

## 2019-01-01 RX ORDER — CLOPIDOGREL BISULFATE 75 MG/1
75 TABLET ORAL DAILY
Status: DISCONTINUED | OUTPATIENT
Start: 2019-01-01 | End: 2019-01-01 | Stop reason: HOSPADM

## 2019-01-01 RX ORDER — GABAPENTIN 300 MG/1
300 CAPSULE ORAL DAILY PRN
COMMUNITY
End: 2019-01-01

## 2019-01-01 RX ORDER — PREDNISONE 20 MG/1
20 TABLET ORAL ONCE
Status: COMPLETED | OUTPATIENT
Start: 2019-01-01 | End: 2019-01-01

## 2019-01-01 RX ORDER — OXYCODONE HYDROCHLORIDE AND ACETAMINOPHEN 5; 325 MG/1; MG/1
0.5 TABLET ORAL EVERY 6 HOURS PRN
Status: DISCONTINUED | OUTPATIENT
Start: 2019-01-01 | End: 2019-01-01

## 2019-01-01 RX ORDER — GLYCOPYRROLATE 0.2 MG/ML
INJECTION INTRAMUSCULAR; INTRAVENOUS AS NEEDED
Status: DISCONTINUED | OUTPATIENT
Start: 2019-01-01 | End: 2019-01-01 | Stop reason: SURG

## 2019-01-01 RX ORDER — HYDRALAZINE HYDROCHLORIDE 20 MG/ML
10 INJECTION INTRAMUSCULAR; INTRAVENOUS EVERY 6 HOURS PRN
Status: DISCONTINUED | OUTPATIENT
Start: 2019-01-01 | End: 2019-01-01 | Stop reason: HOSPADM

## 2019-01-01 RX ORDER — ATENOLOL 50 MG/1
50 TABLET ORAL DAILY
Status: DISCONTINUED | OUTPATIENT
Start: 2019-01-01 | End: 2019-01-01 | Stop reason: HOSPADM

## 2019-01-01 RX ORDER — HYDROCHLOROTHIAZIDE 12.5 MG/1
12.5 TABLET ORAL DAILY
COMMUNITY
End: 2019-01-01 | Stop reason: HOSPADM

## 2019-01-01 RX ORDER — CLOPIDOGREL BISULFATE 75 MG/1
300 TABLET ORAL ONCE
Status: COMPLETED | OUTPATIENT
Start: 2019-01-01 | End: 2019-01-01

## 2019-01-01 RX ORDER — ONDANSETRON 2 MG/ML
INJECTION INTRAMUSCULAR; INTRAVENOUS
Status: COMPLETED
Start: 2019-01-01 | End: 2019-01-01

## 2019-01-01 RX ORDER — HYDROCODONE BITARTRATE AND ACETAMINOPHEN 5; 325 MG/1; MG/1
1 TABLET ORAL EVERY 6 HOURS PRN
Qty: 12 TABLET | Refills: 0 | Status: SHIPPED | OUTPATIENT
Start: 2019-01-01 | End: 2019-01-01 | Stop reason: HOSPADM

## 2019-01-01 RX ORDER — GABAPENTIN 300 MG/1
300 CAPSULE ORAL DAILY PRN
Status: DISCONTINUED | OUTPATIENT
Start: 2019-01-01 | End: 2019-01-01 | Stop reason: SDUPTHER

## 2019-01-01 RX ORDER — ATENOLOL 25 MG/1
25 TABLET ORAL DAILY
Status: ON HOLD | COMMUNITY
End: 2019-01-01

## 2019-01-01 RX ORDER — AMLODIPINE BESYLATE 5 MG/1
5 TABLET ORAL ONCE
Status: COMPLETED | OUTPATIENT
Start: 2019-01-01 | End: 2019-01-01

## 2019-01-01 RX ORDER — ONDANSETRON 2 MG/ML
4 INJECTION INTRAMUSCULAR; INTRAVENOUS EVERY 6 HOURS PRN
Status: DISCONTINUED | OUTPATIENT
Start: 2019-01-01 | End: 2019-01-01

## 2019-01-01 RX ORDER — AMOXICILLIN AND CLAVULANATE POTASSIUM 500; 125 MG/1; MG/1
1 TABLET, FILM COATED ORAL EVERY 12 HOURS SCHEDULED
Status: DISCONTINUED | OUTPATIENT
Start: 2019-01-01 | End: 2019-01-01 | Stop reason: HOSPADM

## 2019-01-01 RX ORDER — FENTANYL CITRATE 50 UG/ML
50 INJECTION, SOLUTION INTRAMUSCULAR; INTRAVENOUS
Status: DISCONTINUED | OUTPATIENT
Start: 2019-01-01 | End: 2019-01-01 | Stop reason: HOSPADM

## 2019-01-01 RX ORDER — NICOTINE POLACRILEX 4 MG
15 LOZENGE BUCCAL
Status: DISCONTINUED | OUTPATIENT
Start: 2019-01-01 | End: 2019-01-01

## 2019-01-01 RX ORDER — ONDANSETRON 2 MG/ML
4 INJECTION INTRAMUSCULAR; INTRAVENOUS EVERY 6 HOURS PRN
Status: DISCONTINUED | OUTPATIENT
Start: 2019-01-01 | End: 2019-01-01 | Stop reason: HOSPADM

## 2019-01-01 RX ORDER — AMLODIPINE BESYLATE 5 MG/1
5 TABLET ORAL 2 TIMES DAILY
Status: DISCONTINUED | OUTPATIENT
Start: 2019-01-01 | End: 2019-01-01 | Stop reason: HOSPADM

## 2019-01-01 RX ORDER — PREDNISONE 10 MG/1
5 TABLET ORAL ONCE
Status: DISCONTINUED | OUTPATIENT
Start: 2019-01-01 | End: 2019-01-01 | Stop reason: HOSPADM

## 2019-01-01 RX ORDER — CEFAZOLIN SODIUM 1 G/3ML
INJECTION, POWDER, FOR SOLUTION INTRAMUSCULAR; INTRAVENOUS AS NEEDED
Status: DISCONTINUED | OUTPATIENT
Start: 2019-01-01 | End: 2019-01-01 | Stop reason: SURG

## 2019-01-01 RX ORDER — ONDANSETRON 2 MG/ML
4 INJECTION INTRAMUSCULAR; INTRAVENOUS ONCE
Status: COMPLETED | OUTPATIENT
Start: 2019-01-01 | End: 2019-01-01

## 2019-01-01 RX ORDER — SODIUM CHLORIDE 450 MG/100ML
50 INJECTION, SOLUTION INTRAVENOUS CONTINUOUS
Status: DISCONTINUED | OUTPATIENT
Start: 2019-01-01 | End: 2019-01-01

## 2019-01-01 RX ORDER — SODIUM CHLORIDE 0.9 % (FLUSH) 0.9 %
3 SYRINGE (ML) INJECTION EVERY 12 HOURS SCHEDULED
Status: DISCONTINUED | OUTPATIENT
Start: 2019-01-01 | End: 2019-01-01 | Stop reason: HOSPADM

## 2019-01-01 RX ORDER — SODIUM CHLORIDE 9 MG/ML
100 INJECTION, SOLUTION INTRAVENOUS CONTINUOUS
Status: DISCONTINUED | OUTPATIENT
Start: 2019-01-01 | End: 2019-01-01

## 2019-01-01 RX ORDER — HYDROMORPHONE HYDROCHLORIDE 1 MG/ML
0.25 INJECTION, SOLUTION INTRAMUSCULAR; INTRAVENOUS; SUBCUTANEOUS ONCE
Status: DISCONTINUED | OUTPATIENT
Start: 2019-01-01 | End: 2019-01-01

## 2019-01-01 RX ORDER — PREDNISONE 10 MG/1
10 TABLET ORAL ONCE
Status: COMPLETED | OUTPATIENT
Start: 2019-01-01 | End: 2019-01-01

## 2019-01-01 RX ORDER — HYDROCHLOROTHIAZIDE 12.5 MG/1
25 TABLET ORAL DAILY
COMMUNITY
End: 2019-01-01

## 2019-01-01 RX ORDER — ACETAMINOPHEN 325 MG/1
650 TABLET ORAL EVERY 4 HOURS PRN
Qty: 1 BOTTLE | Refills: 0 | Status: SHIPPED | OUTPATIENT
Start: 2019-01-01 | End: 2019-01-01

## 2019-01-01 RX ORDER — HEPARIN SODIUM 5000 [USP'U]/ML
5000 INJECTION, SOLUTION INTRAVENOUS; SUBCUTANEOUS EVERY 12 HOURS SCHEDULED
Status: DISCONTINUED | OUTPATIENT
Start: 2019-01-01 | End: 2019-01-01 | Stop reason: HOSPADM

## 2019-01-01 RX ORDER — LORAZEPAM 2 MG/ML
INJECTION INTRAMUSCULAR
Status: DISPENSED
Start: 2019-01-01 | End: 2019-01-01

## 2019-01-01 RX ORDER — SENNA AND DOCUSATE SODIUM 50; 8.6 MG/1; MG/1
2 TABLET, FILM COATED ORAL 2 TIMES DAILY PRN
Status: DISCONTINUED | OUTPATIENT
Start: 2019-01-01 | End: 2019-01-01 | Stop reason: HOSPADM

## 2019-01-01 RX ORDER — SODIUM CHLORIDE 9 MG/ML
75 INJECTION, SOLUTION INTRAVENOUS CONTINUOUS
Status: DISCONTINUED | OUTPATIENT
Start: 2019-01-01 | End: 2019-01-01 | Stop reason: HOSPADM

## 2019-01-01 RX ORDER — LATANOPROST 50 UG/ML
1 SOLUTION/ DROPS OPHTHALMIC NIGHTLY
Status: DISCONTINUED | OUTPATIENT
Start: 2019-01-01 | End: 2019-01-01

## 2019-01-01 RX ORDER — AMOXICILLIN AND CLAVULANATE POTASSIUM 500; 125 MG/1; MG/1
1 TABLET, FILM COATED ORAL EVERY 12 HOURS SCHEDULED
Qty: 6 TABLET | Refills: 0
Start: 2019-01-01 | End: 2019-01-01

## 2019-01-01 RX ADMIN — LATANOPROST 1 DROP: 50 SOLUTION OPHTHALMIC at 21:12

## 2019-01-01 RX ADMIN — FAMOTIDINE 20 MG: 20 TABLET ORAL at 09:20

## 2019-01-01 RX ADMIN — TRAMADOL HYDROCHLORIDE 50 MG: 50 TABLET, FILM COATED ORAL at 12:36

## 2019-01-01 RX ADMIN — HYDROCODONE BITARTRATE AND ACETAMINOPHEN 1 TABLET: 5; 325 TABLET ORAL at 02:52

## 2019-01-01 RX ADMIN — FAMOTIDINE 20 MG: 10 INJECTION, SOLUTION INTRAVENOUS at 21:39

## 2019-01-01 RX ADMIN — CEFTRIAXONE SODIUM 1 G: 1 INJECTION, POWDER, FOR SOLUTION INTRAMUSCULAR; INTRAVENOUS at 07:54

## 2019-01-01 RX ADMIN — GABAPENTIN 300 MG: 300 CAPSULE ORAL at 22:25

## 2019-01-01 RX ADMIN — PROPOFOL 100 MG: 10 INJECTION, EMULSION INTRAVENOUS at 21:55

## 2019-01-01 RX ADMIN — ATENOLOL 25 MG: 25 TABLET ORAL at 09:24

## 2019-01-01 RX ADMIN — PROPOFOL 25 MG: 10 INJECTION, EMULSION INTRAVENOUS at 22:20

## 2019-01-01 RX ADMIN — ATENOLOL 25 MG: 25 TABLET ORAL at 09:27

## 2019-01-01 RX ADMIN — HYDROCODONE BITARTRATE AND ACETAMINOPHEN 1 TABLET: 5; 325 TABLET ORAL at 16:27

## 2019-01-01 RX ADMIN — ASPIRIN 325 MG: 325 TABLET, DELAYED RELEASE ORAL at 09:21

## 2019-01-01 RX ADMIN — TAZOBACTAM SODIUM AND PIPERACILLIN SODIUM 3.38 G: 375; 3 INJECTION, SOLUTION INTRAVENOUS at 11:40

## 2019-01-01 RX ADMIN — ACETAMINOPHEN 650 MG: 325 TABLET, FILM COATED ORAL at 00:36

## 2019-01-01 RX ADMIN — HEPARIN SODIUM 5000 UNITS: 1000 INJECTION, SOLUTION INTRAVENOUS; SUBCUTANEOUS at 22:38

## 2019-01-01 RX ADMIN — HEPARIN SODIUM 5000 UNITS: 5000 INJECTION INTRAVENOUS; SUBCUTANEOUS at 09:20

## 2019-01-01 RX ADMIN — GABAPENTIN 300 MG: 300 CAPSULE ORAL at 08:12

## 2019-01-01 RX ADMIN — GABAPENTIN 300 MG: 300 CAPSULE ORAL at 17:03

## 2019-01-01 RX ADMIN — PREDNISONE 20 MG: 20 TABLET ORAL at 18:07

## 2019-01-01 RX ADMIN — CLOPIDOGREL BISULFATE 75 MG: 75 TABLET ORAL at 09:24

## 2019-01-01 RX ADMIN — CEFAZOLIN 2 G: 330 INJECTION, POWDER, FOR SOLUTION INTRAMUSCULAR; INTRAVENOUS at 22:12

## 2019-01-01 RX ADMIN — ATENOLOL 50 MG: 50 TABLET ORAL at 09:20

## 2019-01-01 RX ADMIN — GLYCOPYRROLATE 0.3 MG: 0.2 INJECTION, SOLUTION INTRAMUSCULAR; INTRAVENOUS at 22:25

## 2019-01-01 RX ADMIN — HYDRALAZINE HYDROCHLORIDE 10 MG: 20 INJECTION INTRAMUSCULAR; INTRAVENOUS at 23:30

## 2019-01-01 RX ADMIN — TAZOBACTAM SODIUM AND PIPERACILLIN SODIUM 3.38 G: 375; 3 INJECTION, SOLUTION INTRAVENOUS at 12:00

## 2019-01-01 RX ADMIN — HEPARIN SODIUM 5000 UNITS: 5000 INJECTION INTRAVENOUS; SUBCUTANEOUS at 09:45

## 2019-01-01 RX ADMIN — CLOPIDOGREL BISULFATE 75 MG: 75 TABLET ORAL at 09:27

## 2019-01-01 RX ADMIN — HYDROMORPHONE HYDROCHLORIDE 1 MG: 1 INJECTION, SOLUTION INTRAMUSCULAR; INTRAVENOUS; SUBCUTANEOUS at 09:44

## 2019-01-01 RX ADMIN — TAZOBACTAM SODIUM AND PIPERACILLIN SODIUM 3.38 G: 375; 3 INJECTION, SOLUTION INTRAVENOUS at 12:37

## 2019-01-01 RX ADMIN — CLOPIDOGREL BISULFATE 75 MG: 75 TABLET ORAL at 14:57

## 2019-01-01 RX ADMIN — AMLODIPINE BESYLATE 5 MG: 5 TABLET ORAL at 09:21

## 2019-01-01 RX ADMIN — HEPARIN SODIUM 5000 UNITS: 1000 INJECTION, SOLUTION INTRAVENOUS; SUBCUTANEOUS at 21:59

## 2019-01-01 RX ADMIN — ONDANSETRON 4 MG: 2 INJECTION INTRAMUSCULAR; INTRAVENOUS at 02:47

## 2019-01-01 RX ADMIN — TAZOBACTAM SODIUM AND PIPERACILLIN SODIUM 3.38 G: 375; 3 INJECTION, SOLUTION INTRAVENOUS at 22:04

## 2019-01-01 RX ADMIN — ACETAMINOPHEN 650 MG: 325 TABLET, FILM COATED ORAL at 05:46

## 2019-01-01 RX ADMIN — ATENOLOL 50 MG: 50 TABLET ORAL at 09:41

## 2019-01-01 RX ADMIN — NICARDIPINE HYDROCHLORIDE 5 MG/HR: 0.1 INJECTION, SOLUTION INTRAVENOUS at 04:08

## 2019-01-01 RX ADMIN — GABAPENTIN 300 MG: 300 CAPSULE ORAL at 20:32

## 2019-01-01 RX ADMIN — SODIUM CHLORIDE 100 ML/HR: 9 INJECTION, SOLUTION INTRAVENOUS at 10:47

## 2019-01-01 RX ADMIN — FENTANYL CITRATE 25 MCG: 50 INJECTION, SOLUTION INTRAMUSCULAR; INTRAVENOUS at 21:55

## 2019-01-01 RX ADMIN — ATENOLOL 25 MG: 25 TABLET ORAL at 09:52

## 2019-01-01 RX ADMIN — AMLODIPINE BESYLATE 5 MG: 5 TABLET ORAL at 21:27

## 2019-01-01 RX ADMIN — ONDANSETRON 4 MG: 2 INJECTION INTRAMUSCULAR; INTRAVENOUS at 23:35

## 2019-01-01 RX ADMIN — AMLODIPINE BESYLATE 5 MG: 5 TABLET ORAL at 21:11

## 2019-01-01 RX ADMIN — GABAPENTIN 300 MG: 300 CAPSULE ORAL at 09:01

## 2019-01-01 RX ADMIN — ATENOLOL 25 MG: 25 TABLET ORAL at 09:02

## 2019-01-01 RX ADMIN — CEFTRIAXONE SODIUM 1 G: 1 INJECTION, POWDER, FOR SOLUTION INTRAMUSCULAR; INTRAVENOUS at 09:45

## 2019-01-01 RX ADMIN — AMLODIPINE BESYLATE 5 MG: 5 TABLET ORAL at 09:20

## 2019-01-01 RX ADMIN — GABAPENTIN 300 MG: 300 CAPSULE ORAL at 13:44

## 2019-01-01 RX ADMIN — EPHEDRINE SULFATE 5 MG: 50 INJECTION INTRAMUSCULAR; INTRAVENOUS; SUBCUTANEOUS at 22:00

## 2019-01-01 RX ADMIN — FAMOTIDINE 20 MG: 20 TABLET ORAL at 09:45

## 2019-01-01 RX ADMIN — GABAPENTIN 300 MG: 300 CAPSULE ORAL at 21:26

## 2019-01-01 RX ADMIN — LATANOPROST 1 DROP: 50 SOLUTION OPHTHALMIC at 20:16

## 2019-01-01 RX ADMIN — SODIUM CHLORIDE 500 ML: 9 INJECTION, SOLUTION INTRAVENOUS at 00:40

## 2019-01-01 RX ADMIN — LATANOPROST 1 DROP: 50 SOLUTION OPHTHALMIC at 20:49

## 2019-01-01 RX ADMIN — GABAPENTIN 300 MG: 300 CAPSULE ORAL at 21:11

## 2019-01-01 RX ADMIN — GABAPENTIN 300 MG: 300 CAPSULE ORAL at 05:23

## 2019-01-01 RX ADMIN — FENTANYL CITRATE 50 MCG: 50 INJECTION INTRAMUSCULAR; INTRAVENOUS at 23:42

## 2019-01-01 RX ADMIN — ASPIRIN 325 MG: 325 TABLET, DELAYED RELEASE ORAL at 09:02

## 2019-01-01 RX ADMIN — HYDROMORPHONE HYDROCHLORIDE 1 MG: 1 INJECTION, SOLUTION INTRAMUSCULAR; INTRAVENOUS; SUBCUTANEOUS at 02:26

## 2019-01-01 RX ADMIN — SUCCINYLCHOLINE CHLORIDE 100 MG: 20 INJECTION, SOLUTION INTRAMUSCULAR; INTRAVENOUS at 21:55

## 2019-01-01 RX ADMIN — SODIUM CHLORIDE 75 ML/HR: 9 INJECTION, SOLUTION INTRAVENOUS at 09:21

## 2019-01-01 RX ADMIN — ACETAMINOPHEN 650 MG: 325 TABLET, FILM COATED ORAL at 09:24

## 2019-01-01 RX ADMIN — Medication 250 MG: at 12:36

## 2019-01-01 RX ADMIN — AMLODIPINE BESYLATE 5 MG: 5 TABLET ORAL at 09:27

## 2019-01-01 RX ADMIN — LIDOCAINE HYDROCHLORIDE 100 MG: 10 INJECTION, SOLUTION INFILTRATION; PERINEURAL at 21:55

## 2019-01-01 RX ADMIN — MORPHINE SULFATE 1 MG: 2 INJECTION, SOLUTION INTRAMUSCULAR; INTRAVENOUS at 21:17

## 2019-01-01 RX ADMIN — ONDANSETRON 4 MG: 2 INJECTION INTRAMUSCULAR; INTRAVENOUS at 08:23

## 2019-01-01 RX ADMIN — CLOPIDOGREL BISULFATE 75 MG: 75 TABLET ORAL at 09:41

## 2019-01-01 RX ADMIN — NALOXONE HYDROCHLORIDE 0.2 MG: 0.4 INJECTION, SOLUTION INTRAMUSCULAR; INTRAVENOUS; SUBCUTANEOUS at 18:35

## 2019-01-01 RX ADMIN — ACETAMINOPHEN 650 MG: 325 TABLET, FILM COATED ORAL at 09:21

## 2019-01-01 RX ADMIN — MORPHINE SULFATE 1 MG: 2 INJECTION, SOLUTION INTRAMUSCULAR; INTRAVENOUS at 16:56

## 2019-01-01 RX ADMIN — SODIUM CHLORIDE, PRESERVATIVE FREE 3 ML: 5 INJECTION INTRAVENOUS at 20:08

## 2019-01-01 RX ADMIN — SENNOSIDES, DOCUSATE SODIUM 2 TABLET: 50; 8.6 TABLET, FILM COATED ORAL at 08:12

## 2019-01-01 RX ADMIN — MORPHINE SULFATE 1 MG: 2 INJECTION, SOLUTION INTRAMUSCULAR; INTRAVENOUS at 08:44

## 2019-01-01 RX ADMIN — HEPARIN SODIUM 5000 UNITS: 5000 INJECTION INTRAVENOUS; SUBCUTANEOUS at 20:08

## 2019-01-01 RX ADMIN — ROCURONIUM BROMIDE 5 MG: 10 SOLUTION INTRAVENOUS at 21:55

## 2019-01-01 RX ADMIN — HYDROCODONE BITARTRATE AND ACETAMINOPHEN 1 TABLET: 5; 325 TABLET ORAL at 09:27

## 2019-01-01 RX ADMIN — PREDNISONE 10 MG: 10 TABLET ORAL at 13:46

## 2019-01-01 RX ADMIN — EPHEDRINE SULFATE 5 MG: 50 INJECTION INTRAMUSCULAR; INTRAVENOUS; SUBCUTANEOUS at 22:28

## 2019-01-01 RX ADMIN — CLOPIDOGREL BISULFATE 75 MG: 75 TABLET ORAL at 09:02

## 2019-01-01 RX ADMIN — AMLODIPINE BESYLATE 5 MG: 5 TABLET ORAL at 12:52

## 2019-01-01 RX ADMIN — Medication 250 MG: at 21:11

## 2019-01-01 RX ADMIN — TAZOBACTAM SODIUM AND PIPERACILLIN SODIUM 3.38 G: 375; 3 INJECTION, SOLUTION INTRAVENOUS at 23:58

## 2019-01-01 RX ADMIN — TAZOBACTAM SODIUM AND PIPERACILLIN SODIUM 3.38 G: 375; 3 INJECTION, SOLUTION INTRAVENOUS at 22:10

## 2019-01-01 RX ADMIN — LATANOPROST 1 DROP: 50 SOLUTION OPHTHALMIC at 21:26

## 2019-01-01 RX ADMIN — ACETAMINOPHEN 650 MG: 325 TABLET, FILM COATED ORAL at 18:40

## 2019-01-01 RX ADMIN — ACETAMINOPHEN 650 MG: 325 TABLET, FILM COATED ORAL at 12:52

## 2019-01-01 RX ADMIN — GABAPENTIN 300 MG: 300 CAPSULE ORAL at 13:18

## 2019-01-01 RX ADMIN — LATANOPROST 1 DROP: 50 SOLUTION OPHTHALMIC at 20:59

## 2019-01-01 RX ADMIN — SODIUM CHLORIDE 500 ML: 9 INJECTION, SOLUTION INTRAVENOUS at 03:42

## 2019-01-01 RX ADMIN — CLOPIDOGREL BISULFATE 75 MG: 75 TABLET ORAL at 09:21

## 2019-01-01 RX ADMIN — MORPHINE SULFATE 1 MG: 2 INJECTION, SOLUTION INTRAMUSCULAR; INTRAVENOUS at 00:24

## 2019-01-01 RX ADMIN — GABAPENTIN 300 MG: 300 CAPSULE ORAL at 09:44

## 2019-01-01 RX ADMIN — FAMOTIDINE 20 MG: 20 TABLET ORAL at 17:25

## 2019-01-01 RX ADMIN — ASPIRIN 325 MG: 325 TABLET, DELAYED RELEASE ORAL at 08:12

## 2019-01-01 RX ADMIN — AMLODIPINE BESYLATE 5 MG: 5 TABLET ORAL at 09:24

## 2019-01-01 RX ADMIN — GABAPENTIN 300 MG: 300 CAPSULE ORAL at 04:50

## 2019-01-01 RX ADMIN — ASPIRIN 325 MG: 325 TABLET, DELAYED RELEASE ORAL at 09:27

## 2019-01-01 RX ADMIN — ACETAMINOPHEN 650 MG: 325 TABLET, FILM COATED ORAL at 00:21

## 2019-01-01 RX ADMIN — CLOPIDOGREL BISULFATE 75 MG: 75 TABLET ORAL at 08:12

## 2019-01-01 RX ADMIN — SODIUM CHLORIDE 50 ML/HR: 4.5 INJECTION, SOLUTION INTRAVENOUS at 00:15

## 2019-01-01 RX ADMIN — TAZOBACTAM SODIUM AND PIPERACILLIN SODIUM 3.38 G: 375; 3 INJECTION, SOLUTION INTRAVENOUS at 00:06

## 2019-01-01 RX ADMIN — ASPIRIN 325 MG ORAL TABLET 325 MG: 325 PILL ORAL at 09:20

## 2019-01-01 RX ADMIN — ASPIRIN 325 MG: 325 TABLET, DELAYED RELEASE ORAL at 14:57

## 2019-01-01 RX ADMIN — ASPIRIN 325 MG: 325 TABLET, DELAYED RELEASE ORAL at 09:24

## 2019-01-01 RX ADMIN — GABAPENTIN 300 MG: 300 CAPSULE ORAL at 05:13

## 2019-01-01 RX ADMIN — Medication 250 MG: at 09:27

## 2019-01-01 RX ADMIN — ONDANSETRON 4 MG: 2 INJECTION INTRAMUSCULAR; INTRAVENOUS at 12:52

## 2019-01-01 RX ADMIN — ACETAMINOPHEN 650 MG: 325 TABLET, FILM COATED ORAL at 04:06

## 2019-01-01 RX ADMIN — SODIUM CHLORIDE, PRESERVATIVE FREE 3 ML: 5 INJECTION INTRAVENOUS at 09:21

## 2019-01-01 RX ADMIN — CLOPIDOGREL BISULFATE 300 MG: 75 TABLET ORAL at 08:45

## 2019-01-01 RX ADMIN — NICARDIPINE HYDROCHLORIDE 5 MG/HR: 0.1 INJECTION, SOLUTION INTRAVENOUS at 00:42

## 2019-01-01 RX ADMIN — SODIUM CHLORIDE 75 ML/HR: 9 INJECTION, SOLUTION INTRAVENOUS at 06:33

## 2019-01-01 RX ADMIN — HYDROMORPHONE HYDROCHLORIDE 1 MG: 1 INJECTION, SOLUTION INTRAMUSCULAR; INTRAVENOUS; SUBCUTANEOUS at 05:42

## 2019-01-01 RX ADMIN — TAZOBACTAM SODIUM AND PIPERACILLIN SODIUM 3.38 G: 375; 3 INJECTION, SOLUTION INTRAVENOUS at 12:26

## 2019-01-01 RX ADMIN — AMOXICILLIN AND CLAVULANATE POTASSIUM 500 MG: 500; 125 TABLET, FILM COATED ORAL at 13:44

## 2019-01-01 RX ADMIN — GABAPENTIN 300 MG: 300 CAPSULE ORAL at 14:26

## 2019-07-05 NOTE — ED PROVIDER NOTES
Subjective   Bettye Sargent is a 89 y.o. female presenting to the emergency department complaining of right lower extremity pain that onset 1 week ago. She was seen here earlier today with complaints of right lower extremity pain and was diagnosed with an arterial occlusion and claudication of the right lower extremity. She was advised to admit to the hospital but refused. She notes associated coolness of the right leg that onset a few days ago and numbness in the knee and below that onset yesterday. She has no history of problems with blood flow. She does have a history of tachycardia which is well-maintained with a beta blocker. There are no other acute complaints at this time.            History provided by:  Patient  Leg Pain   Location:  Leg  Leg location:  R leg  Pain details:     Radiates to:  Does not radiate    Severity:  Moderate    Onset quality:  Sudden    Duration:  1 week    Timing:  Constant    Progression:  Worsening  Chronicity:  New  Prior injury to area:  Unable to specify  Relieved by:  None tried  Worsened by:  Nothing  Ineffective treatments:  None tried  Associated symptoms: numbness    Risk factors: obesity        Review of Systems   Musculoskeletal: Positive for myalgias.   Skin:        Coolness of the right lower extremity.   Neurological: Positive for numbness.   All other systems reviewed and are negative.      Past Medical History:   Diagnosis Date   • Neuropathy    • Shingles        Allergies   Allergen Reactions   • Sulfa Antibiotics Other (See Comments)     Unknown         History reviewed. No pertinent surgical history.    History reviewed. No pertinent family history.    Social History     Socioeconomic History   • Marital status:      Spouse name: Not on file   • Number of children: Not on file   • Years of education: Not on file   • Highest education level: Not on file   Tobacco Use   • Smoking status: Never Smoker   Substance and Sexual Activity   • Alcohol use: No      Frequency: Never   • Drug use: No         Objective   Physical Exam   Constitutional: She is oriented to person, place, and time. She appears well-developed and well-nourished. No distress.   Pleasant, nontoxic.   HENT:   Head: Normocephalic and atraumatic.   Nose: Nose normal.   Eyes: Conjunctivae are normal. No scleral icterus.   Neck: Normal range of motion. Neck supple.   Cardiovascular: Normal rate, regular rhythm and normal heart sounds.  Extrasystoles are present.   No murmur heard.  Pulses:       Radial pulses are 2+ on the right side, and 2+ on the left side.        Dorsalis pedis pulses are 0 on the right side, and 1+ on the left side.   Regular rate with extra systoles   Pulmonary/Chest: Effort normal and breath sounds normal.   Abdominal: Soft. There is no tenderness.   Musculoskeletal: Normal range of motion. She exhibits no edema.   Neurological: She is alert and oriented to person, place, and time.   Skin: Skin is warm and dry. Capillary refill is less than 2 seconds in the right foot..   Right lower extremity is cooler than left lower extremity from the mid calf, distally.   Psychiatric: She has a normal mood and affect. Her behavior is normal.   Nursing note and vitals reviewed.      Critical Care  Performed by: Donavon Barber MD  Authorized by: Donavon Barber MD     Critical care provider statement:     Critical care time (minutes):  35    Critical care time was exclusive of:  Separately billable procedures and treating other patients    Critical care was necessary to treat or prevent imminent or life-threatening deterioration of the following conditions:  Circulatory failure    Critical care was time spent personally by me on the following activities:  Ordering and performing treatments and interventions, ordering and review of laboratory studies, ordering and review of radiographic studies, pulse oximetry, re-evaluation of patient's condition, review of old charts, obtaining history from  patient or surrogate, examination of patient, evaluation of patient's response to treatment, discussions with consultants and development of treatment plan with patient or surrogate  Comments:      I discussed the patient with CT surgery, Dr. Dillon.  He advised and we administered very high-dose heparin bolus.  The patient was taken emergently to the operating room for definitive treatment.             ED Course  ED Course as of Jul 06 1129 Fri Jul 05, 2019 2038 Earlier today creatinine was 1.69 with GFR 28  [MS]   2038 I have paged CT surgery on call to discuss this case further.  I anticipate high-dose heparin bolus bolus drip.  Anticipate admission.  We have administered Dilaudid and Zofran.  [MS]   2058 Please see laboratories from earlier this evening.  [MS]      ED Course User Index  [MS] Donavon Barber MD     Recent Results (from the past 24 hour(s))   Light Blue Top    Collection Time: 07/05/19  1:04 PM   Result Value Ref Range    Extra Tube hold for add-on    Green Top (Gel)    Collection Time: 07/05/19  1:04 PM   Result Value Ref Range    Extra Tube Hold for add-ons.    Lavender Top    Collection Time: 07/05/19  1:04 PM   Result Value Ref Range    Extra Tube hold for add-on    Gold Top - SST    Collection Time: 07/05/19  1:04 PM   Result Value Ref Range    Extra Tube Hold for add-ons.    Comprehensive Metabolic Panel    Collection Time: 07/05/19  1:04 PM   Result Value Ref Range    Glucose 113 (H) 65 - 99 mg/dL    BUN 38 (H) 8 - 23 mg/dL    Creatinine 1.69 (H) 0.57 - 1.00 mg/dL    Sodium 141 136 - 145 mmol/L    Potassium 4.7 3.5 - 5.2 mmol/L    Chloride 105 98 - 107 mmol/L    CO2 23.0 22.0 - 29.0 mmol/L    Calcium 9.6 8.2 - 9.6 mg/dL    Total Protein 7.0 6.0 - 8.5 g/dL    Albumin 3.90 3.50 - 5.20 g/dL    ALT (SGPT) 7 1 - 33 U/L    AST (SGOT) 10 1 - 32 U/L    Alkaline Phosphatase 81 39 - 117 U/L    Total Bilirubin 0.4 0.2 - 1.2 mg/dL    eGFR Non African Amer 28 (L) >60 mL/min/1.73    Globulin 3.1  gm/dL    A/G Ratio 1.3 g/dL    BUN/Creatinine Ratio 22.5 7.0 - 25.0    Anion Gap 13.0 5.0 - 15.0 mmol/L   CBC Auto Differential    Collection Time: 07/05/19  1:04 PM   Result Value Ref Range    WBC 7.64 3.40 - 10.80 10*3/mm3    RBC 4.15 3.77 - 5.28 10*6/mm3    Hemoglobin 8.8 (L) 12.0 - 15.9 g/dL    Hematocrit 31.3 (L) 34.0 - 46.6 %    MCV 75.4 (L) 79.0 - 97.0 fL    MCH 21.2 (L) 26.6 - 33.0 pg    MCHC 28.1 (L) 31.5 - 35.7 g/dL    RDW 18.2 (H) 12.3 - 15.4 %    RDW-SD 49.1 37.0 - 54.0 fl    MPV 11.5 6.0 - 12.0 fL    Platelets 336 140 - 450 10*3/mm3    Neutrophil % 66.7 42.7 - 76.0 %    Lymphocyte % 20.9 19.6 - 45.3 %    Monocyte % 8.8 5.0 - 12.0 %    Eosinophil % 2.5 0.3 - 6.2 %    Basophil % 0.8 0.0 - 1.5 %    Immature Grans % 0.3 0.0 - 0.5 %    Neutrophils, Absolute 5.10 1.70 - 7.00 10*3/mm3    Lymphocytes, Absolute 1.60 0.70 - 3.10 10*3/mm3    Monocytes, Absolute 0.67 0.10 - 0.90 10*3/mm3    Eosinophils, Absolute 0.19 0.00 - 0.40 10*3/mm3    Basophils, Absolute 0.06 0.00 - 0.20 10*3/mm3    Immature Grans, Absolute 0.02 0.00 - 0.05 10*3/mm3    nRBC 0.3 (H) 0.0 - 0.2 /100 WBC   Duplex Venous Lower Extremity - Right CAR    Collection Time: 07/05/19  2:28 PM   Result Value Ref Range    BSA 1.8 m^2     CV ECHO JOSE A - BZI_BMI 28.3 kilograms/m^2     CV ECHO JOSE A - BSA([place]OCK) 1.8 m^2     CV ECHO JOSE A - BZI_METRIC_WEIGHT 72.6 kg     CV ECHO JOSE A - BZI_METRIC_HEIGHT 160.0 cm    Right Common Femoral Spont Y     Right Common Femoral Phasic Y     Right Common Femoral Augment Y     Right Common Femoral Compress C     Right Saphenofemoral Junction Spont Y     Right Saphenofemoral Junction Phasic Y     Right Saphenofemoral Junction Augment Y     Right Saphenofemoral Junction Compress C     Right Profunda Femoral Spont Y     Right Profunda Femoral Phasic Y     Right Profunda Femoral Augment Y     Right Profunda Femoral Compress C     Right Proximal Femoral Spont Y     Right Proximal Femoral Phasic Y     Right Proximal  Femoral Augment Y     Right Proximal Femoral Compress C     Right Mid Femoral Spont Y     Right Mid Femoral Phasic Y     Right Mid Femoral Augment Y     Right Mid Femoral Compress C     Right Distal Femoral Spont Y     Right Distal Femoral Phasic Y     Right Distal Femoral Augment Y     Right Distal Femoral Compress C     Right Popliteal Spont Y     Right Popliteal Phasic Y     Right Popliteal Augment Y     Right Popliteal Compress C     Right Posterior Tibial Augment Y     Right Posterior Tibial Compress C     Right Peroneal Augment Y     Right Peroneal Compress C     Right GastronemiusSoleal Compress C     Right Greater Saph AK Compress C     Right Greater Saph BK Compress C     Right Lesser Saph Compress C     Left Common Femoral Spont Y     Left Common Femoral Phasic Y     Left Common Femoral Augment Y     Left Common Femoral Compress C    Duplex Lower Extremity Art / Grafts - Right CAR    Collection Time: 07/05/19  4:25 PM   Result Value Ref Range    RIGHT JOVITA RATIO 0.00     LEFT JOVITA RATIO 1.00     Rt. Tibeoperoneal PSV 0.00 cm/s    Right Brachial Pressure 181 mmHg    Left Brachial Pressure 180 mmHg    CFA Prox PSV-Right 44.00 cm/s    CFA Prox EDV-Right 17.00 cm/s    DFA Prox PSV-Right 86.00 cm/s    DFA Prox EDV-Right 18.00 cm/s    SFA Prox PSV-Right 23.00 cm/s    SFA Prox EDV-Right 13.00 cm/s    SFA Mid PSV-Right 0.00 cm/s    SFA Distal PSV-Right 0.00 cm/s    Popiteal A Prox PSV-Right 0.00 cm/s    Popiteal A Mid PSV-Right 0.00 cm/s    Popiteal A Distal PSV-Right 0.00 cm/s    PTA Prox PSV-Right 0.00 cm/s    PTA Mid PSV-Right 0.00 cm/s    PTA Distal PSV-Right 0.00 cm/s    Peroneal Prox PSV-Right 0.00 cm/s    Peroneal Mid PSV-Right 0.00 cm/s    Peroneal Distal PSV-Right 0.00 cm/s    Ant Tibial A Prox PSV-Right 0.00 cm/s    Ant Tibial A Mid PSV-Right 0.00 cm/s    Ant Tibial A Distal PSV-Right 0.00 cm/s    CFA Prox PSV-Left 419.00 cm/s    LEFT PTA PRESSURE 180 mmHg    RIGHT DPA PRESSURE 0 mmHg    RIGHT PTA PRESSURE  "0 mmHg     Note: In addition to lab results from this visit, the labs listed above may include labs taken at another facility or during a different encounter within the last 24 hours. Please correlate lab times with ED admission and discharge times for further clarification of the services performed during this visit.    No orders to display     Vitals:    07/05/19 1900 07/05/19 2000   BP:  172/64   Pulse: 85    Resp: 20    Temp: 98.1 °F (36.7 °C)    TempSrc: Oral    SpO2: 95%    Weight: 72.6 kg (160 lb)    Height: 154.9 cm (61\")      Medications   sodium chloride 0.9 % flush 10 mL (not administered)   HYDROmorphone (DILAUDID) injection 0.25 mg (not administered)   ondansetron (ZOFRAN) injection 4 mg (not administered)     ECG/EMG Results (last 24 hours)     Procedure Component Value Units Date/Time    ECG 12 Lead [[id number]] Collected:  07/05/19 2000     Updated:  07/05/19 1958        ECG 12 Lead   Final Result   Test Reason : PVD   Blood Pressure : **/** mmHG   Vent. Rate : 071 BPM     Atrial Rate : 071 BPM      P-R Int : 176 ms          QRS Dur : 130 ms       QT Int : 436 ms       P-R-T Axes : 056 -60 075 degrees      QTc Int : 473 ms      Sinus rhythm with frequent premature ventricular complexes   Right bundle branch block   Left anterior fascicular block   ** Bifascicular block **   Left ventricular hypertrophy with repolarization abnormality   Cannot rule out Septal infarct , age undetermined   Abnormal ECG   When compared with ECG of 15-FEB-2011 08:50,   premature ventricular complexes are now present   Right bundle branch block is now present   Confirmed by MASSIEL DESAI MD ([id number]) on 7/6/2019 9:53:31 AM      Referred By:  ED MD           Confirmed By:MASSIEL DESAI MD                          [facility]    Final diagnoses:   Arterial occlusion, lower extremity (CMS/HCC)       Documentation assistance provided by heaven Hopson.  Information recorded by the scribe was done at my direction and has been " verified and validated by me.     Yarely Hopson  07/05/19 2019       Donavon Barber MD  07/06/19 1133

## 2019-07-05 NOTE — ED PROVIDER NOTES
Subjective   Patient is a 89-year-old female who presents with 3-day history of right leg pain that has worsened. Pain starts at the groin and extends all the way down to the ankle.  Is complaining of pain of the entire leg, cold feeling and numbness from the knee down.  Denies injury.        History provided by:  Patient  Leg Pain   Location:  Leg  Time since incident:  3 days  Injury: no    Leg location:  R leg  Pain details:     Quality:  Aching    Severity:  Severe    Onset quality:  Gradual    Duration:  3 days    Timing:  Constant    Progression:  Worsening  Chronicity:  New  Prior injury to area:  No  Relieved by:  None tried  Worsened by:  Nothing  Ineffective treatments:  None tried  Associated symptoms: numbness    Associated symptoms: no back pain, no decreased ROM, no fever and no swelling        Review of Systems   Constitutional: Negative for fever.   Cardiovascular: Negative for chest pain.   Musculoskeletal: Positive for arthralgias (see HPI). Negative for back pain.   Skin: Positive for color change (lori appearance RLL).   Neurological: Positive for numbness.   All other systems reviewed and are negative.      Past Medical History:   Diagnosis Date   • Neuropathy    • Shingles        Allergies   Allergen Reactions   • Sulfa Antibiotics Other (See Comments)     Unknown         History reviewed. No pertinent surgical history.    History reviewed. No pertinent family history.    Social History     Socioeconomic History   • Marital status:      Spouse name: Not on file   • Number of children: Not on file   • Years of education: Not on file   • Highest education level: Not on file   Tobacco Use   • Smoking status: Never Smoker   Substance and Sexual Activity   • Alcohol use: No     Frequency: Never   • Drug use: No           Objective   Physical Exam   Constitutional: She appears well-developed and well-nourished.   HENT:   Right Ear: External ear normal.   Left Ear: External ear normal.   Eyes:  Conjunctivae are normal.   Cardiovascular: Normal rate and regular rhythm.   Pulses:       Dorsalis pedis pulses are 0 on the right side, and 1+ on the left side.        Posterior tibial pulses are 0 on the right side, and 1+ on the left side.   Pulmonary/Chest: Effort normal and breath sounds normal.   Musculoskeletal: Normal range of motion. She exhibits no edema.   Neurological: She is alert.   Skin: Skin is dry and intact. Capillary refill takes more than 3 seconds.        Psychiatric: Her speech is normal. Thought content normal.   Mood is agitated   Vitals reviewed.      Procedures           ED Course      Patient initially refused the arterial Doppler telling me she just wants pain medication and wants to go home.  After explaining the seriousness of decreased blood flow to the lower extremity patient agreed to complete the Doppler.    Preliminary report from the arterial Doppler revealed occlusion of the proximal femoral down.  ABIs absent on the right lower extremity.  Official report pending.    Spoke with patient and family member and recommend admission to hospital with vascular surgical consultation.  Patient refuses.  States she needs pain medication and she will follow-up as an outpatient, and is refusing admission to hospital.  I explained the seriousness of her condition and she understands that she could lose her foot.  Still refuses to be admitted.  Advised to return to the ER with increased discoloration of the foot or leg, uncontrolled pain, or new symptoms.  Patient and family verbalized understanding of all discussed.        MDM      Final diagnoses:   Arterial occlusion, lower extremity (CMS/HCC)   Claudication of right lower extremity (CMS/HCC)   Right leg pain   Renal insufficiency            Dali Bruce APRN  07/05/19 2329       Dali Bruce APRN  07/05/19 1640

## 2019-07-06 PROBLEM — I70.209 ARTERIAL OCCLUSION, LOWER EXTREMITY (HCC): Status: ACTIVE | Noted: 2019-01-01

## 2019-07-06 PROBLEM — R00.0 TACHYCARDIA: Status: ACTIVE | Noted: 2019-01-01

## 2019-07-06 PROBLEM — D64.9 ANEMIA: Status: ACTIVE | Noted: 2019-01-01

## 2019-07-06 PROBLEM — N28.9 RENAL INSUFFICIENCY: Status: ACTIVE | Noted: 2019-01-01

## 2019-07-06 PROBLEM — I99.8 ISCHEMIC LEG: Status: ACTIVE | Noted: 2019-01-01

## 2019-07-06 NOTE — SIGNIFICANT NOTE
"Pain management and family's perception vs. Patients. Family approached nursing staff and stated that \"she is in agony. She is hurting.\" Pain medicine taken from accudose. Went into room to administer med. Had to wake pt (snoring respirations) to ask if she was in pain. She stated, \"no not right now\" and that she didn't want anything for pain. Will continue to monitor and will educate family on pain med regimen and expectations. ----------------MAGDALENA Hutchins RN----------------------------------  "

## 2019-07-06 NOTE — PROGRESS NOTES
ICU ADMISSION NOTE    Chief complaint :  Right leg ischemia,     Subjective     Patient is a 89 y.o. female who has a 5 day H/O R sided lower back/buttock pain w/ 3.5 day H/O pain in her calf/foot.  For 2 days the limb has been cool and numb.  She presented to our ED on the 5th and after initially refusing treatment allowed the limb to be Dopplered which showed clot from R femoral artery to the level of the ankle.  Lab work showed an elevated lactic acid (2.6) and creatine (1.69).      Dr. Dillon was consulted and the patient was taken emergently to the OR where he performed an right femoral thrombectomy, angioplasty, and stent placement.  She had two incidences of bradycardia during surgery.      She has a remote history of smoking without history of COPD.  She does take aspirin and a beta-blocker as an outpatient.  She is not a diabetic.  She was anemic on admission.    Review of Systems  Review of Systems   Constitutional: Negative.    Musculoskeletal:        R Hip/lower back pain, R foot numbness/cyanosis.   All other systems reviewed and are negative.       Home Medications  Medications Prior to Admission   Medication Sig Dispense Refill Last Dose   • aspirin 325 MG tablet Take 325 mg by mouth Daily.   7/5/2019 at Unknown time   • atenolol (TENORMIN) 25 MG tablet Take 25 mg by mouth Daily.   7/5/2019 at Unknown time   • gabapentin (NEURONTIN) 300 MG capsule Take 300 mg by mouth Daily As Needed.   7/4/2019 at Unknown time   • hydrochlorothiazide (HYDRODIURIL) 25 MG tablet Take 25 mg by mouth Daily.   7/5/2019 at Unknown time   • HYDROcodone-acetaminophen (NORCO) 5-325 MG per tablet Take 1 tablet by mouth Every 6 (Six) Hours As Needed for Moderate Pain . 12 tablet 0 7/5/2019 at 1600       History  Past Medical History:   Diagnosis Date   • Neuropathy    • Shingles      History reviewed. No pertinent surgical history.  History reviewed. No pertinent family history.  Social History     Tobacco Use   • Smoking  "status: Former Smoker   • Smokeless tobacco: Never Used   Substance Use Topics   • Alcohol use: No     Frequency: Never   • Drug use: No     Medications Prior to Admission   Medication Sig Dispense Refill Last Dose   • aspirin 325 MG tablet Take 325 mg by mouth Daily.   7/5/2019 at Unknown time   • atenolol (TENORMIN) 25 MG tablet Take 25 mg by mouth Daily.   7/5/2019 at Unknown time   • gabapentin (NEURONTIN) 300 MG capsule Take 300 mg by mouth Daily As Needed.   7/4/2019 at Unknown time   • hydrochlorothiazide (HYDRODIURIL) 25 MG tablet Take 25 mg by mouth Daily.   7/5/2019 at Unknown time   • HYDROcodone-acetaminophen (NORCO) 5-325 MG per tablet Take 1 tablet by mouth Every 6 (Six) Hours As Needed for Moderate Pain . 12 tablet 0 7/5/2019 at 1600     Allergies:  Sulfa antibiotics      Objective     Vital Signs  Blood pressure 130/52, pulse 82, temperature 97.5 °F (36.4 °C), temperature source Oral, resp. rate 20, height 154.9 cm (61\"), weight 72.6 kg (160 lb), SpO2 96 %.    Physical Exam:  General Appearance:   Elderly white woman, sleeping, comfortable   Head:   Atraumatic   Eyes:          Not examined   Ears:     Throat:  Not examined   Neck:  Trachea midline, no palpable thyroid.  No carotid bruit   Back:      Lungs:    Symmetric chest expansion.  Breath sounds bilateral, equal, clear    Heart:   Regular rhythm, bradycardia, S1, S2 auscultated without murmur   Abdomen:    Flat.  Bowel sounds present, soft, nontender   Rectal:     Deferred   Extremities:    R femoral drsg c/d/i.  R foot dusky w/ slow cap refill, cool to touch   Pulses:   + R popiteal, unable to doppler R DP/PT pulses since OR   Skin:  Warm and dry   Lymph nodes:    Neurologic:  Sleeping       Results Review:   Lab Results (last 24 hours)     Procedure Component Value Units Date/Time    Lactic Acid, Reflex Timer (This will reflex a repeat order 3-3:15 hours after ordered.) [[id number]] Collected:  07/05/19 2006    Specimen:  Blood Updated:  " 07/05/19 2346     Extra Tube Hold for add-ons.     Comment: Auto resulted.       [place] Draw [[id number]] Collected:  07/05/19 2006    Specimen:  Blood Updated:  07/05/19 2116    Narrative:       The following orders were created for panel order [place] Draw.  Procedure                               Abnormality         Status                     ---------                               -----------         ------                     Light Blue Top[[id number]]                                   Final result               Green Top (Gel)[[id number]]                                  Final result               Lavender Top[[id number]]                                     Final result               Gold Top - SST[[id number]]                                   Final result               Green Top (No Gel)[[id number]]                                                            Please view results for these tests on the individual orders.    Light Blue Top [[id number]] Collected:  07/05/19 2006    Specimen:  Blood Updated:  07/05/19 2116     Extra Tube hold for add-on     Comment: Auto resulted       Green Top (Gel) [[id number]] Collected:  07/05/19 2006    Specimen:  Blood Updated:  07/05/19 2116     Extra Tube Hold for add-ons.     Comment: Auto resulted.       Lavender Top [[id number]] Collected:  07/05/19 2006    Specimen:  Blood Updated:  07/05/19 2116     Extra Tube hold for add-on     Comment: Auto resulted       Gold Top - SST [[id number]] Collected:  07/05/19 2006    Specimen:  Blood Updated:  07/05/19 2116     Extra Tube Hold for add-ons.     Comment: Auto resulted.       Protime-INR [[id number]]  (Abnormal) Collected:  07/05/19 2006    Specimen:  Blood Updated:  07/05/19 2108     Protime 17.3 Seconds      INR 1.48    aPTT [[id number]]  (Normal) Collected:  07/05/19 2006    Specimen:  Blood Updated:  07/05/19 2108     PTT 27.0 seconds     Narrative:       PTT = The equivalent PTT values for the therapeutic range of heparin levels  at 0.3 to 0.5 U/ml are 55 to 70 seconds.    CK [[id number]]  (Normal) Collected:  07/05/19 2006    Specimen:  Blood Updated:  07/05/19 2050     Creatine Kinase 55 U/L     Lactic Acid, Plasma [[id number]]  (Abnormal) Collected:  07/05/19 2006    Specimen:  Blood Updated:  07/05/19 2044     Lactate 2.6 mmol/L      Comment: Falsely depressed results may occur on samples drawn from patients receiving N-Acetylcysteine (NAC) or Metamizole.           Imaging Results (last 24 hours)     Procedure Component Value Units Date/Time    XR [place] OR Procedure [[id number]] Resulted:  07/05/19 2319     Updated:  07/05/19 2319           PROBLEM LIST      Acute ischemic leg, s/p thrombectomy (Dr. Dillon, 7/5/19)    Renal insufficiency, acute versus chronic    Chronic Tachycardia    Anemia    #1 right lower extremity ischemia status post thrombectomy now with a Doppler pulse.  She does not have evidence of atrial fibrillation.  She does have a mildly elevated lactate.    #2 renal insufficiency, is unclear what is her baseline.  She is only at 50 mL's of urine since surgery.  Potassium is normal at 4.7    #3 anemia with microcytic indices suspicious for iron deficient.      #4 suspect hypertension as she was taking atenolol, hydrochlorothiazide as an outpatient    Assessment/Plan   - monitor in ICU  - Cardene if needed for SBP > 140  - patient is an extremely healthy 89 yo w/ very limited PMHX.  Will hold her BB given her intra-operative bradycardia.    - monitor lactic acid and renal function, urine output, potassium  -Stool for occult blood.  Transfuse if needed  - guarded prognosis given duration of ischemia.  Monitor CK,        Elicia Angel MD  07/06/19  3:13 AM    Time: Critical care 25min min    This note was produced with a voice recognition program and may have uncorrected errors.

## 2019-07-06 NOTE — PROGRESS NOTES
CTS Progress Note       LOS: 1 day   Patient Care Team:  Anand Akins MD as PCP - General (Family Medicine)    Chief Complaint: Painful right foot    Vital Signs:  Temp:  [97.4 °F (36.3 °C)-98.2 °F (36.8 °C)] 97.7 °F (36.5 °C)  Heart Rate:  [] 66  Resp:  [16-20] 16  BP: (119-179)/() 129/42    Physical Exam: Right foot is still somewhat cool but it is clearly viable.  I am unable to obtain an adequate Doppler signal in the posterior tibial or dorsalis pedis pulse       Results:   Results from last 7 days   Lab Units 07/06/19  0537   WBC 10*3/mm3 11.08*   HEMOGLOBIN g/dL 8.0*   HEMATOCRIT % 29.2*   PLATELETS 10*3/mm3 311     Results from last 7 days   Lab Units 07/06/19  0537   SODIUM mmol/L 141   POTASSIUM mmol/L 4.7   CHLORIDE mmol/L 104   CO2 mmol/L 23.0   BUN mg/dL 38*   CREATININE mg/dL 1.67*   GLUCOSE mg/dL 125*   CALCIUM mg/dL 8.9           Imaging Results (last 24 hours)     Procedure Component Value Units Date/Time    XR [place] OR Procedure [[id number]] Resulted:  07/05/19 2319     Updated:  07/05/19 2319          Assessment      Acute ischemic leg, s/p thrombectomy (Dr. Dillon, 7/5/19)    Renal insufficiency, acute versus chronic    Chronic Tachycardia    Anemia    Patient had increased lactic acid level in the emergency department preoperatively and lactic acid levels this morning have now returned to normal.  He still have an inadequate Doppler signal in the foot but the foot is clearly viable at this time and she denies any pain in the foot.  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  I believe I have adequately revascularize her profundus femoral artery and in my opinion is no further intervention that can be done at this time.  I would like to transfuse this anemic 89-year-old patient today begin Plavix at this time.    Plan   Type and cross 2 units and transfuse 1 unit over 4 hours.   Plavix 150 mg p.o. today and 75 mg p.o. daily.  CBC and BMP in the a.m.    Please note that portions of this note were completed with a voice recognition program. Efforts were made to edit the dictations, but occasionally words are mistranscribed.    Candido Dillon MD  07/06/19  7:14 AM

## 2019-07-06 NOTE — ANESTHESIA PREPROCEDURE EVALUATION
" Anesthesia Evaluation     Patient summary reviewed and Nursing notes reviewed   NPO Solid Status: > 4 hours  NPO Liquid Status: Waived due to emergency           Airway   Mallampati: II  TM distance: >3 FB  Neck ROM: full  Possible difficult intubation  Dental      Pulmonary    (+) a smoker (remote 20 years ago ) Former,   (-) COPD, asthma, shortness of breath, recent URI  Cardiovascular     ECG reviewed  Patient on routine beta blocker    (+) dysrhythmias Tachycardia,   (-) hypertension, CAD, angina, cardiac stents, hyperlipidemia    ROS comment: Sinus rhythm with frequent premature ventricular complexes  Right bundle branch block  Left anterior fascicular block  \" Bifascicular block \"  Left ventricular hypertrophy with repolarization abnormality    Neuro/Psych  (-) seizures, CVA  GI/Hepatic/Renal/Endo    (+)   renal disease (Creat 1.6 ) CRI,   (-) hepatitis, liver disease, diabetes, hypothyroidism    Musculoskeletal     Abdominal    Substance History      OB/GYN          Other        ROS/Med Hx Other: HCT 31                  Anesthesia Plan    ASA 3 - emergent     general   Rapid sequence(RSI CP ETT)  intravenous induction   Anesthetic plan, all risks, benefits, and alternatives have been provided, discussed and informed consent has been obtained with: patient.    Plan discussed with CRNA.      "

## 2019-07-06 NOTE — PLAN OF CARE
Problem: Patient Care Overview  Goal: Plan of Care Review  Outcome: Ongoing (interventions implemented as appropriate)   07/06/19 0770   Coping/Psychosocial   Plan of Care Reviewed With patient;family   Plan of Care Review   Progress no change   OTHER   Outcome Summary admitted from the ED. arrived on unit post thrombectomy with no pulse in R LE. C/O of back pain. otherwise VSS. refused I/O cath multiple times. bladder scan post void greater than 250. will continue to monitor.        Problem: Skin Injury Risk (Adult)  Goal: Identify Related Risk Factors and Signs and Symptoms  Outcome: Ongoing (interventions implemented as appropriate)    Goal: Skin Health and Integrity  Outcome: Ongoing (interventions implemented as appropriate)

## 2019-07-06 NOTE — ANESTHESIA PROCEDURE NOTES
Airway  Urgency: emergent      General Information and Staff    Patient location during procedure: OR

## 2019-07-06 NOTE — H&P
CTS History & Physical         Patient Care Team:  Anand Akins MD as PCP - General (Family Medicine)    Chief complaint right leg pain    HPI  Patient is a 89 y.o. female who has a 5 or 6-day history of lower back pain and right hip pain which she attributed to a bad back.  However for the last 3-1/2 days she is complaining of pain in the right calf and foot and for 2 days she has had coolness and states that her right foot and lower leg have been numb.  She presented to the emergency department today with these complaints and initially refused an arterial Doppler pain medication and to be discharged home.  Ultimately Doppler demonstrated clot from the right femoral artery to the level of the ankle and essentially no Doppler signals in the posterior tibial or dorsalis pedis vessel.  Patient has a 20-year history of smoking but states she has not smoked in over 20 years.  I was called to see her and recommended that we proceed urgently to the operating room lactic acid level was positive it elevated and patient serum creatinine is 1.6.  Discussed the situation with the patient family.  She is able to move her toes  Review of Systems  Pertinent items are noted in HPI.    Constitutional: Negative for malaise/fatigue.  Negative for chills, fever, night sweats and weight loss.  HENT: Negative for hearing loss, odynophagia and sore throat.    Cardiovascular: Positive for claudication negative for dyspnea on exertion.  Negative for chest pain, leg swelling, orthopnea and palpitations.  Respiratory: Negative for shortness of breath.  Negative for cough and hemoptysis.  Endocrine:  Negative for cold intolerance, heat intolerance, polydipsia, polyphagia and polyuria.  Hematologic/Lymphatic: Negative for easy bruising/bleeding.  Musculoskeletal: Negative for joint pain, joint swelling and myalgias.  Positive for coolness and pain in the right foot  Gastrointestinal: Negative for abdominal pain, constipation, diarrhea,  hematemesis, hematochezia, melena, nausea and vomiting.  Genitourinary: Negative for dysuria, frequency and hematuria.  Neurological: Negative for focal weakness, headaches, numbness and seizures.  Psychiatric/Behavioral: Negative for depression and suicidal ideas.  The patient is not nervous/anxious.  All other systems are reviewed and are negative.              Musculoskeletal: No joint pain or back pain.    All other review of systems is negative    History  Past Medical History:   Diagnosis Date   • Neuropathy    • Shingles      History reviewed. No pertinent surgical history.  History reviewed. No pertinent family history.  Social History     Tobacco Use   • Smoking status: Never Smoker   Substance Use Topics   • Alcohol use: No     Frequency: Never   • Drug use: No     Medications Prior to Admission   Medication Sig Dispense Refill Last Dose   • aspirin 325 MG tablet Take 325 mg by mouth Daily.   7/5/2019 at Unknown time   • atenolol (TENORMIN) 25 MG tablet Take 25 mg by mouth Daily.   7/5/2019 at Unknown time   • gabapentin (NEURONTIN) 300 MG capsule Take 300 mg by mouth Daily As Needed.   7/4/2019 at Unknown time   • hydrochlorothiazide (HYDRODIURIL) 25 MG tablet Take 25 mg by mouth Daily.   7/5/2019 at Unknown time   • HYDROcodone-acetaminophen (NORCO) 5-325 MG per tablet Take 1 tablet by mouth Every 6 (Six) Hours As Needed for Moderate Pain . 12 tablet 0 7/5/2019 at 1600     Allergies:  Sulfa antibiotics      Objective    Vital Signs  Temp:  [97.8 °F (36.6 °C)-98.1 °F (36.7 °C)] 98 °F (36.7 °C)  Heart Rate:  [55-85] 61  Resp:  [16-20] 18  BP: (133-178)/(57-85) 157/61      Physical Exam:  CONSTITUTIONAL: Alert and conversant, Well dressed, Well nourished, No acute distress  EYES: Sclera clean, Anicteric, Pupils equal  ENT: No nasal deviation, Trachea midline  NECK: No neck masses, Supple  LUNGS: No wheezing, Cough, non-congested  HEART: No rubs, No murmurs  GASTROINTESTINAL: Soft, non-distended, No masses,  Non tender  to palpation, normal bowel sounds  NEURO: No motor deficits, No sensory deficits, Cranial Nerves 2 through 12 grossly intact  PSYCHIATRIC: Oriented to person, place and time, No memory deficits, Mood appropriate  VASCULAR: No carotid bruits, the right foot is significantly cool and somewhat bluish discolored compared to the left.  There is no Doppler signal in the right posterior tibial drop dorsalis pedis and I cannot palpate a right femoral pulse  MUSKULOSKELETAL: No extremity trauma or extremity asymmetry    Results:      Results from last 7 days   Lab Units 07/05/19  1304   WBC 10*3/mm3 7.64   HEMOGLOBIN g/dL 8.8*   HEMATOCRIT % 31.3*   PLATELETS 10*3/mm3 336     Results from last 7 days   Lab Units 07/05/19  1304   SODIUM mmol/L 141   POTASSIUM mmol/L 4.7   CHLORIDE mmol/L 105   CO2 mmol/L 23.0   BUN mg/dL 38*   CREATININE mg/dL 1.69*   GLUCOSE mg/dL 113*   CALCIUM mg/dL 9.6         ABGs:       Invalid input(s): PO2        Imaging Results (last 72 hours)     ** No results found for the last 72 hours. **          Assessment    Patient has a 3-day history of right foot and leg pain starting a few days before this she had right thigh and buttocks pain.  She has a 2-day history of numbness in the right foot and coolness.  Arterial duplex demonstrates no flow from the femoral artery to the ankle and this 89-year-old patient.  She already has a elevated serum lactic acid level and is anemic and has elevated serum creatinine.  Explained to the family that this is been going on for period of time the risk of limb loss is high they understand this.  We will attempt thromboembolectomy with arteriographic studies this patient is not a good candidate for a lengthy leg revascularization procedure.  I am also concerned about the length of time her leg is been ischemic and questionable viability.  They are agreeable to proceed with my plan         Plan  Decided to proceed as an emergency to the operating room  tonight the risk of limb loss is high      Please note that portions of this note were completed with a voice recognition program. Efforts were made to edit the dictations, but occasionally words are mistranscribed.    Candido Dillon MD  07/05/19  9:49 PM

## 2019-07-06 NOTE — OP NOTE
Operative Report    Preop Diagnosis: Ischemic right leg.  Peripheral arterial vascular disease          Procedure: Right femoral artery cutdown.  Catheter in aorta.  Angioplasty and stent of the proximal right common iliac artery 7 x 79 covered VBX stent.  This was followed in the proximal right external iliac with an 8 x 10 via bond graft followed more distally by a 7 x 5 Biobond graft.        Surgeons: Candido Dillon MD surgeon.  Devonte Basurto PA-C Assistant        Indication: This patient presented to the emergency room with approximately 3 and half a 4-day history of back and right buttocks pain and a 2-day history of numbness in the right foot.  Her lactic acid level was elevated in the emergency room.  I believe her and her family clearly understood the high risk nature of this procedure ultrasound demonstrated no flow from the femoral artery to the level of the ankle.  Risk of limb loss death were explained as well as infection by understood and agreed to proceed.  She also has renal insufficiency and understood we will give contrast agent but the minimal amount possible.        Description: Supine position sterile prep drape antibiotics given patient intubated in the right femoral artery was identified by cutdown it had no flow and no palpable pulse it was a rockhard concrete tube.  Additional heparin was given the artery was opened transversely there was no flow from the common femoral there was no flow retrograde noted from the superficial femoral there was some weak flow from the profundus femoral backfilling the open arteriotomy.  There was hard calcium plaque at the ostium of the superficial femoral artery and is appeared to have been occluded for many years.  We were able to place a 4 [place] sheath into the common femoral artery in a retrograde fashion but were unable to pass a Magic torque wire.  Using a hydrophilic wire I was able to pass this into the aorta with contrast run demonstrated  approximately 6 or 8 cm section of occlusion of the right common and proximal external iliac artery.  I change this hydrophilic wire to a Magic torque wire and was able to place a 7 x 79 covered VBX graft at the common iliac artery.  This was ballooned to 8 mm near the aorta and 7 mm distally.  We then placed an 8 mm x 10 cm via bond graft within this to extend distally and following this realized we still had an area in the very distal iliac approaching the inguinal ligament that had disease.  I then used a 7 x 5 cm viable on graft ...I realized that I was placing a smaller graft within a larger graft but the vessel was so small that the previously placed 8 Janet  meter graft had not fully expanded and I knew this would be a satisfactory approach  .  Following this we saw good flow with a hand-held injection and complete resolution of the stenosis.  At this point the patient had had 2 episodes of significant bradycardia and we closed the common femoral artery and had good quality Doppler signals in the profundus.  In this elderly patient with a chronically occluded superficial femoral I felt there would be no benefit in attempting any further aggressive maneuvers.  I believe her problems originated from occlusion of her iliac system which shut down her profunda.  She received a total of 50 mm of contrast 5 minutes and 18 seconds of fluoroscopy time.  Again her arteries were rockhard and calcific and I feel at this point there is nothing further we can do in terms of revascularization.  In addition her lactic acid level was elevated preoperatively we will follow this closely postoperatively incision was closed with multiple layers of suture.    Please note that portions of this note were completed with a voice recognition program. Efforts were made to edit the dictations, but occasionally words are mistranscribed.

## 2019-07-06 NOTE — PROGRESS NOTES
Intensive Care Follow-up      LOS: 1 day     Ms. Bettye Sargent, 89 y.o. female is followed for:right Lower extremity ischemia     Subjective - Interval History   Patient is a 89 y.o. female who has a 5 day H/O R sided lower back/buttock pain w/ 3.5 day H/O pain in her calf/foot.  For 2 days the limb has been cool and numb.  She presented to our ED on the 5th and after initially refusing treatment allowed the limb to be Dopplered which showed clot from R femoral artery to the level of the ankle.  Lab work showed an elevated lactic acid (2.6) and creatine (1.69).    Patient was taken operating room by Dr. Dillon who formed angioplasty and stent placement.  She had some bradycardia during surgery.   She is doing fairly well this morning says she still has some discomfort in the leg but not as much as she did before.  She denies any new problems.    The patient's relevant past medical, surgical and social history were reviewed and updated in Epic as appropriate.     Objective     Infusions:    niCARdipine 5-15 mg/hr Last Rate: Stopped (07/06/19 0145)   sodium chloride 50 mL/hr Last Rate: 50 mL/hr (07/06/19 0015)     Medications:    clopidogrel 300 mg Oral Once   [START ON 7/7/2019] clopidogrel 75 mg Oral Daily       Vital Sign Min/Max for last 24 hours  Temp  Min: 97.4 °F (36.3 °C)  Max: 98.2 °F (36.8 °C)   BP  Min: 119/42  Max: 179/73   Pulse  Min: 51  Max: 111   Resp  Min: 16  Max: 20   SpO2  Min: 91 %  Max: 100 %   No Data Recorded      Intake/Output Summary (Last 24 hours) at 7/6/2019 0832  Last data filed at 7/6/2019 0400  Gross per 24 hour   Intake 214 ml   Output 50 ml   Net 164 ml           Physical Exam:   GENERAL: Awake and alert   HEENT: Normocephalic   LUNGS: Fairly clear   HEART: Normal S1-S2   ABDOMEN: Soft   EXTREMITIES: Right leg may be slightly cooler than the left.   NEURO/PSYCH: No focal lateralizing deficit    Results from last 7 days   Lab Units 07/06/19  0537 07/05/19  1304   WBC 10*3/mm3 11.08*  7.64   HEMOGLOBIN g/dL 8.0* 8.8*   PLATELETS 10*3/mm3 311 336     Results from last 7 days   Lab Units 07/06/19  0537 07/05/19  1304   SODIUM mmol/L 141 141   POTASSIUM mmol/L 4.7 4.7   CO2 mmol/L 23.0 23.0   BUN mg/dL 38* 38*   CREATININE mg/dL 1.67* 1.69*   MAGNESIUM mg/dL 2.3  --    PHOSPHORUS mg/dL 5.2*  --    GLUCOSE mg/dL 125* 113*     Estimated Creatinine Clearance: 19.6 mL/min (A) (by C-G formula based on SCr of 1.67 mg/dL (H)).    Results from last 7 days   Lab Units 07/06/19  0537   HEMOGLOBIN A1C % 5.50           Lab Results   Component Value Date    LACTATE 1.9 07/06/2019          Images: No new imaging    I reviewed the patient's results and images.     Impression      Patient Active Problem List   Diagnosis Code   • Acute ischemic leg, s/p thrombectomy (Dr. Dillon, 7/5/19) I99.8   • Renal insufficiency, acute versus chronic N28.9   • Chronic Tachycardia R00.0   • Anemia D64.9            Plan         Patient is awake and alert no acute distress.  She seems to be improving from her procedure.  Serum lactate is improved creatinine is minimally changed.  CT surgery is planning transfused.  We will continue current supportive measures.  We will follow renal function and lactate.   Plan of care and goals reviewed with nurse at daily rounds.   I discussed the patient's findings and my recommendations with patient and nursing staff       Alejandro Silverman MD  Pulmonary and Critical Care Medicine  07/06/19 8:32 AM

## 2019-07-06 NOTE — ANESTHESIA POSTPROCEDURE EVALUATION
Patient: Bettye Sargent    Procedure Summary     Date:  07/05/19 Room / Location:   [facility] /  [facility]    Anesthesia Start:  2148 Anesthesia Stop:      Procedure:  FEMORAL THROMBECTOMY/EMBOLECTOMY (Right Thigh) Diagnosis:      Surgeon:  Candido Dillon MD Provider:  Rufino Ford MD    Anesthesia Type:  general ASA Status:  3 - Emergent          Anesthesia Type: general  Last vitals  BP   157/61 (07/05/19 2141)   Temp   98 °F (36.7 °C) (07/05/19 2125)   Pulse   61 (07/05/19 2141)   Resp   18 (07/05/19 2141)     SpO2   94 % (07/05/19 2125)     Post Anesthesia Care and Evaluation    Patient location during evaluation: PACU  Patient participation: complete - patient participated  Level of consciousness: awake and alert  Pain score: 0  Pain management: adequate  Airway patency: patent  Anesthetic complications: No anesthetic complications  PONV Status: none  Cardiovascular status: hemodynamically stable and acceptable  Respiratory status: nonlabored ventilation, acceptable and nasal cannula  Hydration status: acceptable    Comments: VSS      
WDL

## 2019-07-07 NOTE — PROGRESS NOTES
Intensive Care Follow-up      LOS: 2 days     Ms. Bettye Sargent, 89 y.o. female is followed for: Right lower extremity ischemia.     Subjective - Interval History   Patient is a 89 y.o. female who has a 5 day H/O R sided lower back/buttock pain w/ 3.5 day H/O pain in her calf/foot.  For 2 days the limb has been cool and numb.  She presented to our ED on the 5th and after initially refusing treatment allowed the limb to be Dopplered which showed clot from R femoral artery to the level of the ankle.  Lab work showed an elevated lactic acid (2.6) and creatine (1.69).    Patient was taken operating room by Dr. Dillon who formed angioplasty and stent placement.  She had some bradycardia during surgery  Patient was apparently sedated earlier today but is awake and alert at this time.  She is wondering why she is still in the ICU.    The patient's relevant past medical, surgical and social history were reviewed and updated in Epic as appropriate.     Objective     Infusions:    niCARdipine 5-15 mg/hr Last Rate: Stopped (07/06/19 0145)   sodium chloride 50 mL/hr Last Rate: Stopped (07/06/19 1400)     Medications:    aspirin 325 mg Oral Daily   atenolol 25 mg Oral Q24H   clopidogrel 75 mg Oral Daily     Intake/Output       07/06/19 0700 - 07/07/19 0659    Intake (ml) 1038    Output (ml) 625    Net (ml) 413        Vital Sign Min/Max for last 24 hours  Temp  Min: 97.4 °F (36.3 °C)  Max: 100.4 °F (38 °C)   BP  Min: 99/58  Max: 155/54   Pulse  Min: 59  Max: 93   Resp  Min: 16  Max: 20   SpO2  Min: 87 %  Max: 99 %   Flow (L/min)  Min: 2  Max: 2        Physical Exam:   GENERAL: Awake and alert   HEENT: Normocephalic   LUNGS: Fairly clear   HEART: Normal S1-S2   GI: Soft   EXTREMITIES: Feet appear of equal color at present.   NEURO/PSYCH: No focal lateralizing deficits    Results from last 7 days   Lab Units 07/07/19  0423 07/06/19  1840 07/06/19  0537   WBC 10*3/mm3 16.30* 16.12* 11.08*   HEMOGLOBIN g/dL 8.6* 10.3* 8.0*    PLATELETS 10*3/mm3 274 310 311     Results from last 7 days   Lab Units 07/07/19  0423 07/06/19  1840 07/06/19  0537   SODIUM mmol/L 134* 134* 141   POTASSIUM mmol/L 4.7 5.1 4.7   CO2 mmol/L 20.0* 16.0* 23.0   BUN mg/dL 38* 39* 38*   CREATININE mg/dL 1.91* 1.85* 1.67*   MAGNESIUM mg/dL  --   --  2.3   PHOSPHORUS mg/dL 3.8 4.1 5.2*   GLUCOSE mg/dL 182* 147* 125*     Estimated Creatinine Clearance: 17.1 mL/min (A) (by C-G formula based on SCr of 1.91 mg/dL (H)).    Results from last 7 days   Lab Units 07/06/19  0537   HEMOGLOBIN A1C % 5.50           Lab Results   Component Value Date    LACTATE 1.3 07/07/2019          Images: CT of the abdomen shows no retroperitoneal bleed.    I reviewed the patient's results and images.     Impression      Active Hospital Problems    Diagnosis   • Acute ischemic leg, s/p thrombectomy (Dr. Dillon, 7/5/19)   • Renal insufficiency, acute versus chronic   • Chronic Tachycardia   • Anemia   • Arterial occlusion, lower extremity (CMS/HCC)            Plan         Patient seems to be improving.  Lactate is now normal.  We will continue supportive measures.  Creatinine is slightly higher no need to be followed.   Plan of care and goals reviewed with nurse   I discussed the patient's findings and my recommendations with patient and nursing staff      High level of risk due to:  illness with threat to life or bodily function.     Alejandro Silverman MD  Pulmonary and Critical Care Medicine

## 2019-07-07 NOTE — PLAN OF CARE
Problem: Patient Care Overview  Goal: Plan of Care Review  Outcome: Ongoing (interventions implemented as appropriate)   07/07/19 1442   Coping/Psychosocial   Plan of Care Reviewed With patient   Plan of Care Review   Progress improving   OTHER   Outcome Summary Patient was very drowsy from [id number]. Patient is now alert, VSS, on 2LNC/RA, RLE and LLE pulses dopplerable. Will continue to monitor patient.

## 2019-07-07 NOTE — PROGRESS NOTES
CTS Progress Note       LOS: 2 days   Patient Care Team:  Anand Akins MD as PCP - General (Family Medicine)    Chief Complaint: Ischemic leg    Vital Signs:  Temp:  [97 °F (36.1 °C)-98.3 °F (36.8 °C)] 98.2 °F (36.8 °C)  Heart Rate:  [52-93] 73  Resp:  [14-18] 18  BP: ()/() 102/36    Physical Exam: Patient is somewhat lethargic and just received a dose of Dilaudid.  Was very alert and conversant yesterday and apparently has been conversant until recent Dilaudid.  She has a Doppler signal that is weak in the posterior tibial and dorsalis pedis on the right.  Doppler signals are actually as strong endovascular quality as they have been since surgery , right foot is also warmer than yesterday and appears viable       Results:   Results from last 7 days   Lab Units 07/07/19  0423   WBC 10*3/mm3 16.30*   HEMOGLOBIN g/dL 8.6*   HEMATOCRIT % 30.2*   PLATELETS 10*3/mm3 274     Results from last 7 days   Lab Units 07/07/19  0423   SODIUM mmol/L 134*   POTASSIUM mmol/L 4.7   CHLORIDE mmol/L 100   CO2 mmol/L 20.0*   BUN mg/dL 38*   CREATININE mg/dL 1.91*   GLUCOSE mg/dL 182*   CALCIUM mg/dL 8.6           Imaging Results (last 24 hours)     ** No results found for the last 24 hours. **          Assessment      Acute ischemic leg, s/p thrombectomy (Dr. Dillon, 7/5/19)    Renal insufficiency, acute versus chronic    Chronic Tachycardia    Anemia    Arterial occlusion, lower extremity (CMS/HCC)    Patient now has a Doppler signal in the right dorsalis pedis and posterior tibial artery.  Right foot is warmer than left foot     however the patient had an increase in serum lactic acid level yesterday evening to 3.4 however that has normalized this morning.  She is still complaining of right thigh and buttocks pain which she also complained of for for 5 days preoperatively.  There is been a decrease in serum hemoglobin and this possibly could be associated with hydration last night.  Creatinine is increased to 1.9  and this can prevent us from CT angiography.  However we could perform a CT scan without contrast of the abdomen and pelvis to determine if there might be a retroperitoneal hematoma but this should be unlikely as the stents that were placed were all covered in her artery was approached via cutdown and not percutaneous stick,, I will ask Dr. Blankenship to give his opinion regarding this patient but ultimately I still suspect she may come to amputation  Plan   CT scan of the abdomen and pelvis today without contrast.. But thus far her foot is viable and Doppler signals are present and 2 vessels in the right foot and I can see no further vascular procedure that I would perform on this  Nonagenarian.. Continuing Plavix and aspirin therapy    Please note that portions of this note were completed with a voice recognition program. Efforts were made to edit the dictations, but occasionally words are mistranscribed.    Candido Dillon MD  07/07/19  7:01 AM

## 2019-07-08 PROBLEM — R91.8 PULMONARY INFILTRATE: Status: ACTIVE | Noted: 2019-01-01

## 2019-07-08 NOTE — CONSULTS
[facility] Consult Note      Patient Care Team:  Anand Akins MD as PCP - General (Family Medicine)  Anand Akins MD  No ref. provider found    Subjective     History of Present Illness: Ms. Cruz is a pleasant 89-year-old female with a long-standing history of paroxysmal atrial tachycardia.  She says that she was first diagnosed with PAT as a 16-year-old.  She has been taking atenolol for years in regard to this.  She was admitted to [facility] on 7/5/2019 due to his acute right leg ischemia.  She is status post thrombectomy by Dr. Dillon.  We have been asked to see Ms. Cruz due to her paroxysmal atrial tachycardia.  She was in PAT this morning and we were getting ready to give adenosine when she suddenly broke this rhythm on her own.  Of note, she did not get her atenolol this morning due to nausea and vomiting.  She is currently denying any chest pain, shortness of breath, or palpitations and states that she does not want to have anything done about her tachycardia.      Current Facility-Administered Medications:   •  adenosine (ADENOCARD) 6 MG/2ML injection  - ADS Override Pull, , , ,   •  acetaminophen (TYLENOL) tablet 650 mg, 650 mg, Oral, Q4H PRN, Sam Basurto PA-C, 650 mg at 07/08/19 0546  •  aspirin EC tablet 325 mg, 325 mg, Oral, Daily, Candido Dillon MD, 325 mg at 07/08/19 0812  •  atenolol (TENORMIN) tablet 25 mg, 25 mg, Oral, Q24H, Alejandro Silverman MD, 25 mg at 07/06/19 0952  •  clopidogrel (PLAVIX) tablet 75 mg, 75 mg, Oral, Daily, Candido Dillon MD, 75 mg at 07/08/19 0812  •  gabapentin (NEURONTIN) capsule 300 mg, 300 mg, Oral, Q12H PRN, Alejandro Monique MD, 300 mg at 07/08/19 0812  •  latanoprost (XALATAN) 0.005 % ophthalmic solution 1 drop, 1 drop, Both Eyes, Nightly, Nunu Burgess APRN, 1 drop at 07/07/19 2049  •  magnesium hydroxide (MILK OF MAGNESIA) suspension 2407  mg/10mL 10 mL, 10 mL, Oral, Daily PRN, Sam Basurto PA-C  •  ondansetron (ZOFRAN) tablet 4 mg, 4 mg, Oral, Q6H PRN **OR** ondansetron (ZOFRAN) injection 4 mg, 4 mg, Intravenous, Q6H PRN, Sam Basurto PA-C, 4 mg at 07/08/19 0823  •  piperacillin-tazobactam (ZOSYN) 3.375 g in iso-osmotic dextrose 50 ml (premix), 3.375 g, Intravenous, Once, Alejandro Monique MD, 3.375 g at 07/08/19 1200  •  piperacillin-tazobactam (ZOSYN) 3.375 g in iso-osmotic dextrose 50 ml (premix), 3.375 g, Intravenous, Q12H, Alejandro Monique MD  •  sennosides-docusate sodium (SENOKOT-S) 8.6-50 MG tablet 2 tablet, 2 tablet, Oral, BID PRN, Sam Basurto PA-C, 2 tablet at 07/08/19 0812  •  sodium chloride 0.9 % flush 10 mL, 10 mL, Intravenous, PRN, Donavon Barber MD  •  sodium chloride 0.9 % infusion, 100 mL/hr, Intravenous, Continuous, Alejandro Monique MD, Last Rate: 100 mL/hr at 07/08/19 1047, 100 mL/hr at 07/08/19 1047    Social History     Socioeconomic History   • Marital status:      Spouse name: Not on file   • Number of children: Not on file   • Years of education: Not on file   • Highest education level: Not on file   Tobacco Use   • Smoking status: Former Smoker   • Smokeless tobacco: Never Used   Substance and Sexual Activity   • Alcohol use: No     Frequency: Never   • Drug use: No       History reviewed. No pertinent family history.    Review of Systems   Constitutional: Negative.    HENT: Negative.    Eyes: Negative.    Respiratory: Negative.    Cardiovascular: Negative.    Gastrointestinal: Negative.    Endocrine: Negative.    Genitourinary: Negative.    Musculoskeletal: Negative.    Skin: Negative.    Allergic/Immunologic: Negative.    Neurological: Negative.    Hematological: Negative.    Psychiatric/Behavioral: Negative.           Objective     Vital Signs  Temp:  [97.6 °F (36.4 °C)-99.1 °F (37.3 °C)] 97.6 °F (36.4 °C)  Heart Rate:  [] 95  Resp:  [16-24] 22  BP: ()/(46-84)  146/72      Intake/Output Summary (Last 24 hours) at 7/8/2019 1227  Last data filed at 7/8/2019 0300  Gross per 24 hour   Intake 300 ml   Output 500 ml   Net -200 ml     No intake/output data recorded.    Physical Exam   Constitutional: No distress.   HENT:   Nose: Nose normal.   Mouth/Throat: Oropharynx is clear.   Eyes: Conjunctivae are normal. Pupils are equal, round, and reactive to light.   Neck: Normal range of motion. Neck supple. No JVD present.   Cardiovascular: Normal rate and regular rhythm. Frequent extrasystoles are present. Exam reveals no gallop and no S3.   No murmur heard.  Pulmonary/Chest: Effort normal and breath sounds normal. She exhibits no tenderness.   Abdominal: Soft. Bowel sounds are normal.   Musculoskeletal: Normal range of motion. She exhibits no edema.   Neurological: She is alert and oriented to person, place, and time.   Skin: Skin is warm.           Results Review:    I reviewed the patient's new clinical results.    WBC WBC   Date/Time Value Ref Range Status   07/08/2019 0326 22.38 (H) 3.40 - 10.80 10*3/mm3 Final   07/07/2019 0423 16.30 (H) 3.40 - 10.80 10*3/mm3 Final   07/06/2019 1840 16.12 (H) 3.40 - 10.80 10*3/mm3 Final   07/06/2019 0537 11.08 (H) 3.40 - 10.80 10*3/mm3 Final   07/05/2019 1304 7.64 3.40 - 10.80 10*3/mm3 Final      HGB Hemoglobin   Date/Time Value Ref Range Status   07/08/2019 0326 8.8 (L) 12.0 - 15.9 g/dL Final   07/07/2019 0423 8.6 (L) 12.0 - 15.9 g/dL Final   07/06/2019 1840 10.3 (L) 12.0 - 15.9 g/dL Final   07/06/2019 0537 8.0 (L) 12.0 - 15.9 g/dL Final   07/05/2019 1304 8.8 (L) 12.0 - 15.9 g/dL Final      HCT Hematocrit   Date/Time Value Ref Range Status   07/08/2019 0326 30.5 (L) 34.0 - 46.6 % Final   07/07/2019 0423 30.2 (L) 34.0 - 46.6 % Final   07/06/2019 1840 38.6 34.0 - 46.6 % Final   07/06/2019 0537 29.2 (L) 34.0 - 46.6 % Final   07/05/2019 1304 31.3 (L) 34.0 - 46.6 % Final      Platlets Platelets   Date/Time Value Ref Range Status   07/08/2019 0326 255  140 - 450 10*3/mm3 Final   07/07/2019 0423 274 140 - 450 10*3/mm3 Final   07/06/2019 1840 310 140 - 450 10*3/mm3 Final   07/06/2019 0537 311 140 - 450 10*3/mm3 Final   07/05/2019 1304 336 140 - 450 10*3/mm3 Final        PT/INR:      Lab Results   Component Value Date    PROTIME 17.3 (H) 07/05/2019    INR 1.48 (H) 07/05/2019       Sodium Sodium   Date/Time Value Ref Range Status   07/08/2019 0326 134 (L) 136 - 145 mmol/L Final   07/07/2019 0423 134 (L) 136 - 145 mmol/L Final   07/06/2019 1840 134 (L) 136 - 145 mmol/L Final   07/06/2019 0537 141 136 - 145 mmol/L Final   07/05/2019 1304 141 136 - 145 mmol/L Final      Potassium Potassium   Date/Time Value Ref Range Status   07/08/2019 0326 4.3 3.5 - 5.2 mmol/L Final   07/07/2019 0423 4.7 3.5 - 5.2 mmol/L Final   07/06/2019 1840 5.1 3.5 - 5.2 mmol/L Final   07/06/2019 0537 4.7 3.5 - 5.2 mmol/L Final   07/05/2019 1304 4.7 3.5 - 5.2 mmol/L Final      Chloride Chloride   Date/Time Value Ref Range Status   07/08/2019 0326 101 98 - 107 mmol/L Final   07/07/2019 0423 100 98 - 107 mmol/L Final   07/06/2019 1840 101 98 - 107 mmol/L Final   07/06/2019 0537 104 98 - 107 mmol/L Final   07/05/2019 1304 105 98 - 107 mmol/L Final      Bicarbonate No results found for: PLASMABICARB   BUN BUN   Date/Time Value Ref Range Status   07/08/2019 0326 33 (H) 8 - 23 mg/dL Final   07/07/2019 0423 38 (H) 8 - 23 mg/dL Final   07/06/2019 1840 39 (H) 8 - 23 mg/dL Final   07/06/2019 0537 38 (H) 8 - 23 mg/dL Final   07/05/2019 1304 38 (H) 8 - 23 mg/dL Final      Creatinine Creatinine   Date/Time Value Ref Range Status   07/08/2019 0326 1.82 (H) 0.57 - 1.00 mg/dL Final   07/07/2019 0423 1.91 (H) 0.57 - 1.00 mg/dL Final   07/06/2019 1840 1.85 (H) 0.57 - 1.00 mg/dL Final   07/06/2019 0537 1.67 (H) 0.57 - 1.00 mg/dL Final   07/05/2019 1304 1.69 (H) 0.57 - 1.00 mg/dL Final      Calcium Calcium   Date/Time Value Ref Range Status   07/08/2019 0326 8.7 8.2 - 9.6 mg/dL Final   07/07/2019 0423 8.6 8.2 - 9.6  mg/dL Final   07/06/2019 1840 9.0 8.2 - 9.6 mg/dL Final   07/06/2019 0537 8.9 8.2 - 9.6 mg/dL Final   07/05/2019 1304 9.6 8.2 - 9.6 mg/dL Final      Magnesium @RESULFAST(MG:3)@   Troponin       No results found for: TROPONINI                                                EKG: normal sinus rhythm, frequent PVC's noted. Atypical RBBB with first degree AV block    Assessment/Plan   Patient Active Problem List   Diagnosis   • Acute ischemic leg, s/p thrombectomy (Dr. Dillon, 7/5/19)   • Renal insufficiency, acute versus chronic   • Chronic Tachycardia   • Anemia   • Arterial occlusion, lower extremity (CMS/HCC)   • RLL Infiltrate (R/O Aspiration pneumonia)     Currently in normal sinus rhythm  Continue beta-blocker  Check echocardiogram for LV function  Monitor renal function  We should at least consider the possibility of an embolus as the cause of her ischemic right leg.  Especially in the setting of a elderly woman with a chronic undifferentiated SVT    I discussed the patients findings and my recommendations with patient, family and nursing staff      RAYO Colin MD  07/08/19  12:27 PM

## 2019-07-08 NOTE — PLAN OF CARE
Problem: Patient Care Overview  Goal: Plan of Care Review  Outcome: Ongoing (interventions implemented as appropriate)   07/08/19 0515   Coping/Psychosocial   Plan of Care Reviewed With patient   Plan of Care Review   Progress improving   OTHER   Outcome Summary Patient mostly AOX4, occasionally disoriented to situation. Patient VSS on Cardene. Patient remains drowsy and slow to respond. Patient still on 2Lpm of oxygen via NC. RLE and LLE doppler pulses. Will continue to monitor.       Problem: Skin Injury Risk (Adult)  Goal: Identify Related Risk Factors and Signs and Symptoms  Outcome: Ongoing (interventions implemented as appropriate)    Goal: Skin Health and Integrity  Outcome: Ongoing (interventions implemented as appropriate)

## 2019-07-08 NOTE — PROGRESS NOTES
Multidisciplinary Rounds    Patient Name: Bettye Sargent  Date of Encounter: 07/08/19 11:51 AM  MRN: [id number]  Admission date: 7/5/2019    Reason for visit: MDR. RD to continue to follow per protocol.     Additional information obtained during MDR:  S/P right femoral thrombectomy, angioplasty, and stent placement on 7/5.  Issues with nausea and vomiting this morning per RN.  KUB obtained.      KUB results: Mild gastric distention with stool scattered throughout the colon. No evidence of obvious obstruction or gross free intraperitoneal air    RN reports patient not tolerating anything PO at this time. Plan for make NPO with ice chips and re-evaluate status in a.m,    Current diet: NPO Diet NPO Except: Ice Chips  No active supplement orders      Evaluation of Received Nutrient/Fluid Intake:  2 Days: isuf data     EMR reviewed     Intervention:  Follow treatment plan  Care plan reviewed  Change diet to NPO with ice chips  Monitor status/ restart diet as medically appropriate     Follow up:   Per protocol      Mayte Roca RD  11:51 AM  Time: 10min

## 2019-07-08 NOTE — PROGRESS NOTES
Intensive Care Follow-up      LOS: 3 days     Ms. Bettye Sargent, 89 y.o. female is followed for: Glycemic, Electrolyte, Respiratory, and Medical management     Subjective - Interval History     Patient is a 89 y.o. female who presented to our ED on the 5th and after initially refusing treatment for a Doppler which showed clot from R femoral artery to the level of the ankle.  Patient was taken operating room by Dr. Dillon who formed angioplasty and stent placement.  She had some bradycardia during surgery.    She is currently awake and alert  Oxygenating adequately on 2 L/min via nasal cannula  WBC remains elevated  Creatinine remains elevated  Not requiring vasopressor support  Complaining of diffuse abdominal pain    The patient's relevant past medical, surgical and social history were reviewed and updated in Epic as appropriate.     Objective     Infusions:    niCARdipine 5-15 mg/hr Last Rate: 5 mg/hr (07/08/19 0408)   sodium chloride 50 mL/hr Last Rate: Stopped (07/06/19 1400)     Medications:    aspirin 325 mg Oral Daily   atenolol 25 mg Oral Q24H   clopidogrel 75 mg Oral Daily   latanoprost 1 drop Both Eyes Nightly     Intake/Output       07/07/19 0700 - 07/08/19 0659    Intake (ml) 300    Output (ml) 500    Net (ml) -200        Vital Sign Min/Max for last 24 hours  Temp  Min: 98.3 °F (36.8 °C)  Max: 100.4 °F (38 °C)   BP  Min: 98/63  Max: 172/66   Pulse  Min: 74  Max: 101   Resp  Min: 16  Max: 20   SpO2  Min: 92 %  Max: 97 %   No Data Recorded        Physical Exam:   GENERAL: Awake, no distress   HEENT: No adenopathy or thyromegaly   LUNGS: Basilar crackles, no wheezes   HEART: Regular rate and rhythm with a grade 2/6 systolic murmur   GI: Obese, soft, mildly diffusely tender.  Scattered bowel sounds noted   EXTREMITIES: No cyanosis or clubbing.  Trace lower extremity edema   NEURO/PSYCH: Awake, alert, disoriented    Results from last 7 days   Lab Units 07/08/19  0326 07/07/19  0423 07/06/19  1840   WBC  10*3/mm3 22.38* 16.30* 16.12*   HEMOGLOBIN g/dL 8.8* 8.6* 10.3*   PLATELETS 10*3/mm3 255 274 310     Results from last 7 days   Lab Units 07/08/19  0326 07/07/19  0423 07/06/19  1840 07/06/19  0537   SODIUM mmol/L 134* 134* 134* 141   POTASSIUM mmol/L 4.3 4.7 5.1 4.7   CO2 mmol/L 19.0* 20.0* 16.0* 23.0   BUN mg/dL 33* 38* 39* 38*   CREATININE mg/dL 1.82* 1.91* 1.85* 1.67*   MAGNESIUM mg/dL 2.0  --   --  2.3   PHOSPHORUS mg/dL  --  3.8 4.1 5.2*   GLUCOSE mg/dL 194* 182* 147* 125*     Estimated Creatinine Clearance: 18 mL/min (A) (by C-G formula based on SCr of 1.82 mg/dL (H)).    Results from last 7 days   Lab Units 07/06/19  0537   HEMOGLOBIN A1C % 5.50           Lab Results   Component Value Date    LACTATE 1.4 07/08/2019          Images: CT angiogram of the lower extremity is noted    I reviewed the patient's results and images.     Impression      Active Hospital Problems    Diagnosis   • Acute ischemic leg, s/p thrombectomy (Dr. Dillon, 7/5/19)   • Renal insufficiency, acute versus chronic   • Chronic Tachycardia   • Anemia   • Arterial occlusion, lower extremity (CMS/HCC)         Plan        Urine culture and chest x-ray  Monitor for evidence of active infection  Serial abdominal exams  Restart her home Neurontin for pain  Continue to monitor renal function  Continue IV fluids  Work on placement     Plan of care and goals reviewed with Multidisciplinary Team and Antibiotic Stewardship rounds   I discussed the patient's findings and my recommendations with patient and nursing staff      High level of risk due to:  illness with threat to life or bodily function.     BLACK Monique MD  Pulmonary and Critical Care Medicine

## 2019-07-08 NOTE — PROGRESS NOTES
CTS Progress Note       LOS: 3 days   Patient Care Team:  Anand Akins MD as PCP - General (Family Medicine)    Chief Complaint: Ischemic leg    Vital Signs:  Temp:  [98.3 °F (36.8 °C)-100.4 °F (38 °C)] 98.3 °F (36.8 °C)  Heart Rate:  [] 87  Resp:  [16-20] 20  BP: ()/(43-84) 143/69    Physical Exam: There is a satisfactory Doppler signal in both the right posterior tibial and dorsalis pedis artery.  The right foot is warm and viable and the right groin is satisfactory without any hematoma       Results:   Results from last 7 days   Lab Units 07/08/19  0326   WBC 10*3/mm3 22.38*   HEMOGLOBIN g/dL 8.8*   HEMATOCRIT % 30.5*   PLATELETS 10*3/mm3 255     Results from last 7 days   Lab Units 07/08/19  0326   SODIUM mmol/L 134*   POTASSIUM mmol/L 4.3   CHLORIDE mmol/L 101   CO2 mmol/L 19.0*   BUN mg/dL 33*   CREATININE mg/dL 1.82*   GLUCOSE mg/dL 194*   CALCIUM mg/dL 8.7           Imaging Results (last 24 hours)     Procedure Component Value Units Date/Time    CT Abdomen Pelvis Without Contrast [[id number]] Collected:  07/07/19 0818     Updated:  07/07/19 1118    Narrative:       EXAMINATION: CT ABDOMEN/PELVIS WO CONTRAST - 07/07/2019      INDICATION: Abdominal pain; rule out retroperitoneal bleed and  bullectomy of the leg, decreasing H&H.     TECHNIQUE: Multiple axial CT imaging is obtained of the abdomen and  pelvis without the administration of oral or intravenous contrast.     The radiation dose reduction device was turned on for each scan per the  ALARA (As Low as Reasonably Achievable) protocol.     COMPARISON: NONE     FINDINGS:      ABDOMEN: Atelectatic change is identified at the lung bases bilaterally.  There is vicarious excretion of contrast into the gallbladder. Liver is  homogeneous. The spleen is unremarkable. Kidneys and adrenal glands  reveal nonobstructing stone seen within the right kidney. Parapelvic  cysts seen in the kidneys bilaterally. No evidence of obstruction. There  is stool  scattered throughout the colon. Diverticulosis. No evidence of  diverticulitis. The pancreas is homogeneous.     PELVIS: The pelvic organs are unremarkable in appearance. The pelvic  portion of the gastrointestinal tract is within normal limits. Vascular  calcification seen within the pelvic vessels with stent seen in the  right common iliac artery and external   Iliac artery. There post surgical changes identified within the right  inguinal region. No evidence of retroperitoneal hematoma. Bony  structures reveal degenerative changes seen within the spine.       Impression:       No definite evidence of a retroperitoneal hematoma with  minimal postsurgical changes identified in the right inguinal region.  Atherosclerotic disease seen throughout the abdominal aorta and iliac  vessels. No acute intra-abdominal or pelvic abnormality present.  Atelectatic changes seen in the lung bases bilaterally.     DICTATED:   07/07/2019  EDITED/ls :   07/07/2019      This report was finalized on 7/7/2019 11:15 AM by Dr. Rosalina Alejandro MD.             Assessment      Acute ischemic leg, s/p thrombectomy (Dr. Dillon, 7/5/19)    Renal insufficiency, acute versus chronic    Chronic Tachycardia    Anemia    Arterial occlusion, lower extremity (CMS/HCC)    The right lower extremity has been revascularized as well as possible and are satisfactory Doppler signals in the foot is viable.  Furthermore lactic acid levels have been completely normal on 3 occasions indicating no ischemic or dying muscle.  I am unclear as to why patient has a leukocytosis of 22,000.  I have nothing to add further from a surgical standpoint and I have no further revascularization attempts planned or needed.  Family would like a [facility] evaluation.  Will defer leukocytosis to the intensivist.  Could be transferred to [facility] 24 to 48 hours from my standpoint needs to transfer on aspirin and Plavix    Plan   [facility] evaluation.  Discontinue  narcotics    Please note that portions of this note were completed with a voice recognition program. Efforts were made to edit the dictations, but occasionally words are mistranscribed.    Candido Dillon MD  07/08/19  6:50 AM

## 2019-07-09 NOTE — THERAPY EVALUATION
"Acute Care - Physical Therapy Initial Evaluation   [place]     Patient Name: Bettye Sargent  : 3/28/1927  MRN: [id number]  Today's Date: 2019   Onset of Illness/Injury or Date of Surgery: (P) 19  Date of Referral to PT: (P) 19  Referring Physician: (KANE) Candido Dillon MD      Admit Date: 2019    Visit Dx:     ICD-10-CM ICD-9-CM   1. Arterial occlusion, lower extremity (CMS/HCC) I70.209 444.22   2. Impaired functional mobility, balance, gait, and endurance Z74.09 V49.89     Patient Active Problem List   Diagnosis   • Acute ischemic leg, s/p thrombectomy (Dr. Dillon, 19)   • Renal insufficiency, acute versus chronic   • Chronic Tachycardia   • Anemia   • Arterial occlusion, lower extremity (CMS/HCC)   • RLL Infiltrate (R/O Aspiration pneumonia)     Past Medical History:   Diagnosis Date   • Neuropathy    • Shingles      Past Surgical History:   Procedure Laterality Date   • FEMORAL THROMBECTOMY/EMBOLECTOMY Right 2019    Procedure: Right Femoral artery cutdown with Angioplasty and stent of the proximal right common iliac artery;  Surgeon: Candido Dillon MD;  Location: [place] [address];  Service: Cardiothoracic        PT ASSESSMENT (last 12 hours)      Physical Therapy Evaluation     Row Name 19 1110          PT Evaluation Time/Intention    Subjective Information  complains of;fatigue;weakness;pain  (Pended)   -     Document Type  evaluation  (Pended)   -     Mode of Treatment  physical therapy  (Pended)   -     Patient Effort  good  (Pended)   -     Symptoms Noted During/After Treatment  fatigue  (Pended)   -     Comment  goes by \"Lissy\"  (Pended)   -     Row Name 19 1110          General Information    Patient Profile Reviewed?  yes  (Pended)   -     Onset of Illness/Injury or Date of Surgery  19  (Pended)   -     Referring Physician  Candido Dillon MD  (Pended)   -     Patient Observations  alert;cooperative;agree to therapy  " (Pended)   -     Patient/Family Observations  Daughter and granddaughter in present  (Pended)   -     Prior Level of Function  independent:;gait;transfer;bed mobility;ADL's  (Pended)  c RWx  -     Equipment Currently Used at Home  walker, rolling;shower chair  (Pended)   -     Pertinent History of Current Functional Problem  pt admitted 7/5 for R groin pain that radiated down to her R foot. pt underwent R femoral artery cutdown procedure c catheter in aorta and angioplasty/stent of proximal R common illiac artery.   (Pended)   -     Existing Precautions/Restrictions  fall;oxygen therapy device and L/min  (Pended)   -     Risks Reviewed  patient:;LOB;dizziness;increased discomfort;change in vital signs  (Pended)   -     Benefits Reviewed  patient:;improve function;increase independence;increase strength;decrease pain;increase balance;decrease risk of DVT  (Pended)   -     Barriers to Rehab  none identified  (Pended)   -[facility] Name 07/09/19 1110          Relationship/Environment    Primary Source of Support/Comfort  child(trisha)  (Pended)   -     Name of Support/Comfort Primary Source  Ag [place]  (Pended)   -     Lives With  facility resident;alone  (Pended)   -     Family Caregiver if Needed  other (see comments)  (Pended)  facility staff, daughter/grand daughter  -     Family Caregiver Names  ag licona & grand daughterbrina  (Pended)   -     Row Name 07/09/19 1110          Resource/Environmental Concerns    Current Living Arrangements  independent/assisted living facility  (Pended)   -     Resource/Environmental Concerns  none  (Pended)   -     Transportation Concerns  car, none  (Pended)   -     Row Name 07/09/19 1110          Cognitive Assessment/Interventions    Additional Documentation  Cognitive Assessment/Intervention (Group)  (Pended)   -     Row Name 07/09/19 1110          Cognitive Assessment/Intervention- PT/OT    Affect/Mental Status (Cognitive)   flat/blunted affect;low arousal/lethargic  (Pended)   -     Orientation Status (Cognition)  oriented x 3;oriented to;person;place;situation  (Pended)   -     Follows Commands (Cognition)  follows one step commands;75-90% accuracy  (Pended)   -     Cognitive Function (Cognitive)  safety deficit  (Pended)   -     Safety Deficit (Cognitive)  mild deficit;insight into deficits/self awareness;safety precautions awareness;safety precautions follow-through/compliance  (Pended)   -     Personal Safety Interventions  fall prevention program maintained;gait belt;muscle strengthening facilitated;nonskid shoes/slippers when out of bed  (Pended)   -[facility] Name 07/09/19 1110          Safety Issues, Functional Mobility    Safety Issues Affecting Function (Mobility)  insight into deficits/self awareness;safety precaution awareness;safety precautions follow-through/compliance  (Pended)   -     Impairments Affecting Function (Mobility)  balance;coordination;endurance/activity tolerance;postural/trunk control  (Pended)   -KL     Row Name 07/09/19 1110          Bed Mobility Assessment/Treatment    Comment (Bed Mobility)  UIC  (Pended)   -KL     Row Name 07/09/19 1110          Transfer Assessment/Treatment    Transfer Assessment/Treatment  sit-stand transfer;stand-sit transfer  (Pended)   -     Comment (Transfers)  pt req vc's for proper sequencing for STS  (Pended)   -     Sit-Stand [place] (Transfers)  minimum assist (75% patient effort);1 person assist;verbal cues  (Pended)   -     Stand-Sit [place] (Transfers)  minimum assist (75% patient effort);1 person assist;verbal cues  (Pended)   -[facility] Name 07/09/19 1110          Sit-Stand Transfer    Assistive Device (Sit-Stand Transfers)  walker, front-wheeled  (Pended)   -[facility] Name 07/09/19 1110          Stand-Sit Transfer    Assistive Device (Stand-Sit Transfers)  walker, front-wheeled  (Pended)   -[facility] Name 07/09/19 1110           Gait/Stairs Assessment/Training    02664 - Gait Training Minutes   9  (Pended)   -     Gait/Stairs Assessment/Training  gait/ambulation independence;gait/ambulation assistive device  (Pended)   -     [place] Level (Gait)  minimum assist (75% patient effort);2 person assist;verbal cues  (Pended)   -     Assistive Device (Gait)  walker, front-wheeled  (Pended)   -     Distance in Feet (Gait)  15  (Pended)   -     Pattern (Gait)  step-to  (Pended)   -     Deviations/Abnormal Patterns (Gait)  stride length decreased;gait speed decreased;base of support, wide  (Pended)   -     Bilateral Gait Deviations  forward flexed posture;heel strike decreased  (Pended)   -     Comment (Gait/Stairs)  pt demos severe hip flexion posture when ambulating with FWW. pt given vc's to correct, but continues to ambulate with hip flexed posture.   (Pended)   -KL     Row Name 07/09/19 1110          General ROM    GENERAL ROM COMMENTS  B LE WFL, R LE pain limited for hip extension  (Pended)   -KL     Row Name 07/09/19 1110          MMT (Manual Muscle Testing)    General MMT Comments  BLE grossly 3+/5  (Pended)   -KL     Row Name 07/09/19 1110          Motor Assessment/Intervention    Additional Documentation  Balance (Group)  (Pended)   -KL     Row Name 07/09/19 1110          Balance    Balance  static sitting balance;static standing balance  (Pended)   -KL     Row Name 07/09/19 1110          Static Sitting Balance    Level of [place] (Unsupported Sitting, Static Balance)  contact guard assist  (Pended)   -     Sitting Position (Unsupported Sitting, Static Balance)  sitting in chair  (Pended)   -     Time Able to Maintain Position (Unsupported Sitting, Static Balance)  2 to 3 minutes  (Pended)   -KL     Row Name 07/09/19 1110          Static Standing Balance    Level of [place] (Supported Standing, Static Balance)  minimal assist, 75% patient effort;1 person assist  (Pended)   -     Time Able to Maintain  Position (Supported Standing, Static Balance)  2 to 3 minutes  (Pended)   -     Assistive Device Utilized (Supported Standing, Static Balance)  walker, rolling  (Pended)   -[facility] Name 07/09/19 1110          Sensory Assessment/Intervention    Sensory General Assessment  no sensation deficits identified  (Pended)   -[facility] Name 07/09/19 1110          Pain Assessment    Additional Documentation  Pain Scale: Numbers Pre/Post-Treatment (Group)  (Pended)   -[facility] Name 07/09/19 1110          Pain Scale: Numbers Pre/Post-Treatment    Pain Scale: Numbers, Pretreatment  0/10 - no pain  (Pended)   -     Pain Scale: Numbers, Post-Treatment  2/10  (Pended)   -     Pain Location - Side  Right  (Pended)   -     Pain Location - Orientation  anterior  (Pended)   -     Pain Location  groin  (Pended)   -     Pre/Post Treatment Pain Comment  tolerated  (Pended)   -     Pain Intervention(s)  Repositioned;Ambulation/increased activity;Splinting  (Pended)   -     Row Name             Wound 07/05/19 2340 Right groin incision    Wound - Properties Group Date first assessed: 07/05/19  -JS Time first assessed: 2340  -JS Side: Right  -JS Location: groin  -JS Type: incision  -JS Additional Comments: right groin Aquacel AG  -JR    [facility] Name 07/09/19 1110          Coping    Observed Emotional State  accepting;cooperative  (Pended)   -     Verbalized Emotional State  acceptance  (Pended)   -[facility] Name 07/09/19 1110          Plan of Care Review    Plan of Care Reviewed With  patient  (Pended)   -[facility] Name 07/09/19 1110          Physical Therapy Clinical Impression    Date of Referral to PT  07/09/19  (Pended)   -     PT Diagnosis (PT Clinical Impression)  impaired functional mobility  (Pended)   -     Patient/Family Goals Statement (PT Clinical Impression)  Return to PLoF  (Pended)   -     Criteria for Skilled Interventions Met (PT Clinical Impression)  yes;treatment indicated  (Pended)   -      Impairments Found (describe specific impairments)  aerobic capacity/endurance;gait, locomotion, and balance;muscle performance  (Pended)   -     Rehab Potential (PT Clinical Summary)  good, to achieve stated therapy goals  (Pended)   -     Care Plan Review (PT)  evaluation/treatment results reviewed;care plan/treatment goals reviewed;risks/benefits reviewed;patient/other agree to care plan  (Pended)   -     Row Name 07/09/19 1110          Vital Signs    Pre Systolic BP Rehab  173  (Pended)   -     Pre Treatment Diastolic BP  71  (Pended)   -     Pretreatment Heart Rate (beats/min)  68  (Pended)   -     Intratreatment Heart Rate (beats/min)  80  (Pended)   -     Posttreatment Heart Rate (beats/min)  65  (Pended)   -     Pre SpO2 (%)  99  (Pended)   -     O2 Delivery Pre Treatment  nasal cannula  (Pended)   -     Intra SpO2 (%)  95  (Pended)   -     O2 Delivery Intra Treatment  nasal cannula  (Pended)   -     Post SpO2 (%)  99  (Pended)   -     O2 Delivery Post Treatment  nasal cannula  (Pended)   -     Pre Patient Position  Sitting  (Pended)   -     Intra Patient Position  Standing  (Pended)   -     Post Patient Position  Sitting  (Pended)   -     Row Name 07/09/19 1110          Physical Therapy Goals    Bed Mobility Goal Selection (PT)  bed mobility, PT goal 1  (Pended)   -     Transfer Goal Selection (PT)  transfer, PT goal 1  (Pended)   -     Gait Training Goal Selection (PT)  gait training, PT goal 1  (Pended)   -[facility] Name 07/09/19 1110          Bed Mobility Goal 1 (PT)    Activity/Assistive Device (Bed Mobility Goal 1, PT)  sit to supine/supine to sit  (Pended)   -     [place] Level/Cues Needed (Bed Mobility Goal 1, PT)  conditional independence  (Pended)   -     Time Frame (Bed Mobility Goal 1, PT)  10 days  (Pended)   -     Progress/Outcomes (Bed Mobility Goal 1, PT)  goal ongoing  (Pended)   -     Row Name 07/09/19 1110          Transfer Goal 1 (PT)     Activity/Assistive Device (Transfer Goal 1, PT)  sit-to-stand/stand-to-sit  (Pended)   -KL     [place] Level/Cues Needed (Transfer Goal 1, PT)  conditional independence  (Pended)   -KL     Time Frame (Transfer Goal 1, PT)  10 days  (Pended)   -KL     Progress/Outcome (Transfer Goal 1, PT)  goal ongoing  (Pended)   -     Row Name 07/09/19 1110          Gait Training Goal 1 (PT)    Activity/Assistive Device (Gait Training Goal 1, PT)  gait (walking locomotion);assistive device use;walker, rolling  (Pended)   -KL     [place] Level (Gait Training Goal 1, PT)  conditional independence  (Pended)   -KL     Distance (Gait Goal 1, PT)  200  (Pended)   -KL     Time Frame (Gait Training Goal 1, PT)  10 days  (Pended)   -KL     Progress/Outcome (Gait Training Goal 1, PT)  goal ongoing  (Pended)   -     Row Name 07/09/19 1110          Patient Education Goal (PT)    Activity (Patient Education Goal, PT)  HEP  (Pended)   -KL     [place]/Cues/Accuracy (Memory Goal 2, PT)  demonstrates adequately;verbalizes understanding  (Pended)   -KL     Time Frame (Patient Education Goal, PT)  10 days  (Pended)   -KL     Progress/Outcome (Patient Education Goal, PT)  goal ongoing  (Pended)   -     Row Name 07/09/19 1110          Positioning and Restraints    Pre-Treatment Position  sitting in chair/recliner  (Pended)   -KL     Post Treatment Position  chair  (Pended)   -KL     In Chair  notified nsg;reclined;sitting;call light within reach;encouraged to call for assist;exit alarm on;RUE elevated;legs elevated  (Pended)   -       User Key  (r) = Recorded By, (t) = Taken By, (c) = Cosigned By    Initials Name Provider Type    Lissy Mario RN Registered Nurse    Jodie Mcclure RN Registered Nurse    Joaquin Crabtree, PT Student PT Student        Physical Therapy Education     Title: PT OT SLP Therapies (In Progress)     Topic: Physical Therapy (In Progress)     Point: Mobility training (In Progress)      Learning Progress Summary           Patient Acceptance, TB, NR by  at 7/9/2019 11:10 AM                   Point: Home exercise program (In Progress)     Learning Progress Summary           Patient Acceptance, TB, NR by  at 7/9/2019 11:10 AM                   Point: Body mechanics (In Progress)     Learning Progress Summary           Patient Acceptance, TB, NR by  at 7/9/2019 11:10 AM                   Point: Precautions (In Progress)     Learning Progress Summary           Patient Acceptance, TB, NR by  at 7/9/2019 11:10 AM                               User Key     Initials Effective Dates Name Provider Type Discipline     05/15/19 -  Joaquin Jacob P, PT Student PT Student PT              PT Recommendation and Plan  Anticipated Discharge Disposition (PT): (P) inpatient rehabilitation facility  Planned Therapy Interventions (PT Eval): (P) balance training, bed mobility training, gait training, home exercise program, patient/family education, strengthening, transfer training  Therapy Frequency (PT Clinical Impression): (P) daily  Outcome Summary/Treatment Plan (PT)  Anticipated Discharge Disposition (PT): (P) inpatient rehabilitation facility  Plan of Care Reviewed With: (P) patient  Outcome Summary: (P) pt demos decreased functional mobility with evolving signs/sx's today during PT eval. pt limited by pain and fatigue today. pt warranted for skilled IP PT to increase independence with functional tasks to return to PLoF and safe D/C. recommend D/C to IP rehab where pt may benefit from continued therapy to retun to PLoF and increase safety.   Outcome Measures     Row Name 07/09/19 1110             How much help from another person do you currently need...    Turning from your back to your side while in flat bed without using bedrails?  2  (Pended)   -KL      Moving from lying on back to sitting on the side of a flat bed without bedrails?  2  (Pended)   -KL      Moving to and from a bed to a chair (including  a wheelchair)?  3  (Pended)   -KL      Standing up from a chair using your arms (e.g., wheelchair, bedside chair)?  3  (Pended)   -KL      Climbing 3-5 steps with a railing?  2  (Pended)   -KL      To walk in hospital room?  3  (Pended)   -KL      AM-PAC 6 Clicks Score (PT)  15  (Pended)   -KL         Functional Assessment    Outcome Measure Options  AM-PAC 6 Clicks Basic Mobility (PT)  (Pended)   -KL        User Key  (r) = Recorded By, (t) = Taken By, (c) = Cosigned By    Initials Name Provider Type    Joaquin Crabtree, PT Student PT Student         Time Calculation:   PT Charges     Row Name 07/09/19 1110             Time Calculation    Start Time  1110  (Pended)   -KL      PT Received On  07/09/19  (Pended)   -      PT Goal Re-Cert Due Date  07/19/19  (Pended)   -KL         Time Calculation- PT    Total Timed Code Minutes- PT  9 minute(s)  (Pended)   -KL         Timed Charges    27781 - Gait Training Minutes   9  (Pended)   -        User Key  (r) = Recorded By, (t) = Taken By, (c) = Cosigned By    Initials Name Provider Type    Joaquin Crabtree, PT Student PT Student        Therapy Charges for Today     Code Description Service Date Service Provider Modifiers Qty    71641323442 HC GAIT TRAINING EA 15 MIN 7/9/2019 Joaquin Jacob, PT Student GP 1    23093352783 HC PT EVAL MOD COMPLEXITY 4 7/9/2019 Joaquin Jacob, PT Student GP 1          PT G-Codes  Outcome Measure Options: (P) AM-PAC 6 Clicks Basic Mobility (PT)  AM-PAC 6 Clicks Score (PT): (P) 15      Joaquin Jacob PT Student  7/9/2019

## 2019-07-09 NOTE — PLAN OF CARE
Problem: Patient Care Overview  Goal: Plan of Care Review  Outcome: Ongoing (interventions implemented as appropriate)   07/09/19 1110   Coping/Psychosocial   Plan of Care Reviewed With patient   OTHER   Outcome Summary pt demos decreased functional mobility with evolving signs/sx's today during PT eval. pt limited by pain and fatigue today. pt warranted for skilled IP PT to increase independence with functional tasks to return to PLoF and safe D/C. recommend D/C to IP rehab where pt may benefit from continued therapy to retun to PLoF and increase safety based on performance today.

## 2019-07-09 NOTE — PROGRESS NOTES
CTS Progress Note       LOS: 4 days   Patient Care Team:  Anand Akins MD as PCP - General (Family Medicine)    Chief Complaint: Ischemic leg    Vital Signs:  Temp:  [97.6 °F (36.4 °C)-98.5 °F (36.9 °C)] 98.5 °F (36.9 °C)  Heart Rate:  [68-96] 71  Resp:  [18-24] 20  BP: (120-171)/(56-91) 167/61    Physical Exam: Nausea improved.  Foot warm.  Good quality Doppler signal in the posterior tibial and dorsalis pedis     Results:   Results from last 7 days   Lab Units 07/09/19  0341   WBC 10*3/mm3 19.08*   HEMOGLOBIN g/dL 8.4*   HEMATOCRIT % 29.4*   PLATELETS 10*3/mm3 226     Results from last 7 days   Lab Units 07/09/19  0341   SODIUM mmol/L 135*   POTASSIUM mmol/L 4.5   CHLORIDE mmol/L 104   CO2 mmol/L 19.0*   BUN mg/dL 36*   CREATININE mg/dL 1.49*   GLUCOSE mg/dL 142*   CALCIUM mg/dL 8.4           Imaging Results (last 24 hours)     Procedure Component Value Units Date/Time    XR Abdomen KUB [[id number]] Collected:  07/08/19 0908     Updated:  07/08/19 1804    Narrative:       EXAMINATION: XR ABDOMEN KUB- 07/08/2019      INDICATION: Vomiting; I70.209-Unspecified atherosclerosis of native  arteries of extremities, unspecified extremity      COMPARISON: NONE     FINDINGS: KUB reveals bowel gas pattern that is unremarkable. Mild  gastric distention. No evidence of obvious obstruction or gross free  intraperitoneal air with stool scattered throughout the colon. There is  a stent seen involving the right common and external iliac artery.  Phleboliths seen in the left side of the pelvis.           Impression:       Mild gastric distention with stool scattered throughout the  colon. No evidence of obvious obstruction or gross free intraperitoneal  air.       D:  07/08/2019  E:  07/08/2019     This report was finalized on 7/8/2019 6:00 PM by Dr. Rosalina Alejandro MD.       XR Chest 1 View [[id number]] Collected:  07/08/19 0910     Updated:  07/08/19 0955    Narrative:       EXAMINATION: XR CHEST 1 VW-      INDICATION:  Elevated WBC; I70.209-Unspecified atherosclerosis of native  arteries of extremities, unspecified extremity.      COMPARISON: CT abdomen and pelvis 07/07/2019.     FINDINGS: Left lower lobe airspace disease identified with obscuration  of the left hemidiaphragm. Additionally there is obscuration of the  right lung apex which is favored to represent patient  positioning/rotation on this portable film. No sizable pleural effusion  or pneumothorax. The heart is prominent in size. Chronic appearing  interstitial markings are noted throughout the lungs.       Impression:       1. Left lower lobe airspace disease which is likely a combination of  atelectasis and developing pneumonia in the appropriate clinical  setting.  2. Right lung apex obscuration. This is felt to relate to patient  positioning on this portable film. Attention on followup short-term  chest radiograph is recommended.   3. Mild cardiomegaly suggested.     D:  07/08/2019  E:  07/08/2019     This report was finalized on 7/8/2019 9:52 AM by Dr. Jaret Hull MD.             Assessment      Acute ischemic leg, s/p thrombectomy (Dr. Dillon, 7/5/19)    Renal insufficiency, acute versus chronic    Chronic Tachycardia    Anemia    Arterial occlusion, lower extremity (CMS/HCC)    RLL Infiltrate (R/O Aspiration pneumonia)    Patient continues with Doppler signals could be transferred out of the intensive care unit any time.  Family request [facility] patient should be available for [facility] this afternoon or tomorrow.  Although emboli as the cause of ischemia is certainly possible, this patient has significant underlying peripheral arterial disease also    Plan   Transfer to telemetry.  [facility] to evaluate    Please note that portions of this note were completed with a voice recognition program. Efforts were made to edit the dictations, but occasionally words are mistranscribed.    Candido Dillon MD  07/09/19  6:38 AM

## 2019-07-09 NOTE — PROGRESS NOTES
Continued Stay Note   [place]     Patient Name: Bettye Sargent  MRN: [id number]  Today's Date: 7/9/2019    Admit Date: 7/5/2019    Discharge Plan     Row Name 07/09/19 1342       Plan    Plan  Rehab     Plan Comments  I spoke with dtr Dimple in ICU along with granddtrafal to discuss rehab. Made dtr aware she's not appropriate for acute rehab at [facility] since she can't do 3 hours of rehab a day. So, decision made to make referral to: 1) [facility], 2) [facility] 3) [facility]. I called Susan with [facility] and made referrals. Also, gave referral to [facility]. Patient has orders to transfer from ICU today.  Natalia @ [phone]        Discharge Codes    No documentation.             Monica Feliciano RN

## 2019-07-09 NOTE — PLAN OF CARE
Problem: Patient Care Overview  Goal: Plan of Care Review  Outcome: Ongoing (interventions implemented as appropriate)   07/09/19 9714   Coping/Psychosocial   Plan of Care Reviewed With patient;grandchild(trisha)   OTHER   Outcome Summary OT eval complete. Pt limited by pain, flat affect and fatigue. Pt requires maxA for bed mobility and minAx2 for all transfers and mobility with use of RW. Pt unable to attempt to complete LBD this date. Pt with decreased BUE strength and ROM affecting completion of all ADLS and functional mobility. Recommend d/c to IPR.

## 2019-07-09 NOTE — PLAN OF CARE
Problem: Patient Care Overview  Goal: Plan of Care Review  Outcome: Ongoing (interventions implemented as appropriate)   07/08/19 2123   Coping/Psychosocial   Plan of Care Reviewed With patient;daughter;family   Plan of Care Review   Progress improving   OTHER   Outcome Summary Patient doing much better by end of shift. Vitals stable. O2 reduced. More alert and less pain. Vomiting seems to have subsided. Continuing to doppler lower ext pulses. Today, completed KUB after vomiting, CXR, and echo. Cardiology consulter per Dr. Dillon. Patient is drowsy at times and intermittently nauseated. Remains NPO with ice chips which patient fairly tolerates. IV Abx started today.        Problem: Skin Injury Risk (Adult)  Goal: Identify Related Risk Factors and Signs and Symptoms  Outcome: Ongoing (interventions implemented as appropriate)   07/08/19 2123   Skin Injury Risk (Adult)   Related Risk Factors (Skin Injury Risk) critical care admission     Goal: Skin Health and Integrity  Outcome: Ongoing (interventions implemented as appropriate)   07/08/19 2123   Skin Injury Risk (Adult)   Skin Health and Integrity making progress toward outcome       Problem: Fall Risk (Adult)  Goal: Identify Related Risk Factors and Signs and Symptoms  Outcome: Ongoing (interventions implemented as appropriate)   07/08/19 2123   Fall Risk (Adult)   Related Risk Factors (Fall Risk) fatigue/slow reaction;gait/mobility problems;environment unfamiliar   Signs and Symptoms (Fall Risk) presence of risk factors     Goal: Absence of Fall  Outcome: Outcome(s) achieved Date Met: 07/08/19 07/08/19 2123   Fall Risk (Adult)   Absence of Fall achieves outcome

## 2019-07-09 NOTE — PROGRESS NOTES
"                  Clinical Nutrition     Nutrition Assessment  Reason for Visit:   MDR, Reduced oral intake    Patient Name: Bettye Sargent  YOB: 1927  MRN: [id number]  Date of Encounter: 07/09/19 9:37 AM  Admission date: 7/5/2019    Nutrition Assessment   Admission Diagnosis     Acute ischemic leg, s/p thrombectomy (Dr. Dillon, 7/5/19)    Additional applicable diagnosis, conditions, procedures    Renal insufficiency, acute versus chronic    Chronic Tachycardia    Anemia    Arterial occlusion, lower extremity (CMS/HCC)    RLL Infiltrate (R/O Aspiration pneumonia)    Post op nausea and vomiting    Applicable PMH/ PSxH  PMH: She  has a past medical history of Neuropathy and Shingles.   PSxH: She  has a past surgical history that includes Femoral Thrombectomy/Embolectomy (Right, 7/5/2019).       Reported/Observed/Food/Nutrition Related History:     Nausea and vomiting yesterday morning, KUB obtained revealing mild gastric distention.  Nausea improved this morning and diet restarted with breakfast meal. RN reports patient not interested in solid food at this time.     Patient reports she just wants some light food items for lunch meal, not ready for solid food.  Menu options dicussed. Willing to drink Boost breeze with meals.     Anthropometrics     Height: 154.9 cm (60.98\")  Last filed wt: Weight: 72.6 kg (160 lb) (07/08/19 1425)  Weight Method: Stated    BMI: BMI (Calculated): 30.2  Obese Class I: 30-34.9kg/m2    Ideal Body Weight (IBW) (kg): 48.11     Labs reviewed     Results from last 7 days   Lab Units 07/09/19  0341   SODIUM mmol/L 135*   POTASSIUM mmol/L 4.5   CHLORIDE mmol/L 104   CO2 mmol/L 19.0*   BUN mg/dL 36*   CREATININE mg/dL 1.49*   CALCIUM mg/dL 8.4   BILIRUBIN mg/dL 0.8   ALK PHOS U/L 81   ALT (SGPT) U/L 25   AST (SGOT) U/L 39*   GLUCOSE mg/dL 142*       Results from last 7 days   Lab Units 07/06/19  1957 07/06/19  1614 07/06/19  1130 07/06/19  0729   GLUCOSE mg/dL 157* 146* 142* 136* "     Lab Results   Lab Value Date/Time    HGBA1C 5.50 07/06/2019 0537     Medications reviewed   Pertinent: yes     Current Nutrition Prescription     PO: Diet Regular  No active supplement orders    Intake: isuf data     Nutrition Diagnosis   7/9  Problem Inadequate oral intake   Etiology Altered GI function    Signs/Symptoms Post op nausea and vomiting limited PO intake      Nutrition Intervention     Interventions Goal    General: Nutrition support treatment    Nutrition Interventions   1.  Follow treatment progress, Care plan reviewed, Interview for preferences, Supplement provided   - send Boost Breeze with meals     Monitor/ Evaluation    Per protocol, PO intake, Supplement intake, Pertinent labs    Will Continue to follow per protocol    Mayte Roca RD  Time Spent: 30min

## 2019-07-09 NOTE — PROGRESS NOTES
Intensive Care Follow-up      LOS: 4 days     Ms. Bettye Sargent, 89 y.o. female is followed for: Glycemic, Electrolyte, Respiratory, and Medical management     Subjective - Interval History     Patient is a 89 y.o. female who presented to our ED on the 5th and after initially refusing treatment for a Doppler which showed clot from R femoral artery to the level of the ankle.  Patient was taken operating room by Dr. Dillon who formed angioplasty and stent placement.  She had some bradycardia during surgery and postoperative nausea and vomiting.  Elevated WBC and right lower lobe infiltrate consistent with a right lower lobe pneumonia, probably aspiration.    No further vomiting  Complaining of back and leg pain  Interested in eating  Oxygenating adequately on 2 L/min via nasal cannula    The patient's relevant past medical, surgical and social history were reviewed and updated in Epic as appropriate.     Objective     Infusions:    sodium chloride 100 mL/hr Last Rate: 100 mL/hr (07/08/19 1047)     Medications:    aspirin 325 mg Oral Daily   atenolol 25 mg Oral Q24H   clopidogrel 75 mg Oral Daily   latanoprost 1 drop Both Eyes Nightly   piperacillin-tazobactam 3.375 g Intravenous Q12H     Intake/Output       07/08/19 0700 - 07/09/19 0659    Intake (ml) 2714    Output (ml) 1100    Net (ml) 1614    Last Weight  72.6 kg (160 lb)        Vital Sign Min/Max for last 24 hours  Temp  Min: 97.7 °F (36.5 °C)  Max: 98.5 °F (36.9 °C)   BP  Min: 120/91  Max: 171/71   Pulse  Min: 68  Max: 96   Resp  Min: 18  Max: 24   SpO2  Min: 90 %  Max: 100 %   No Data Recorded        Physical Exam:   GENERAL: Awake, no distress   HEENT: No adenopathy or thyromegaly   LUNGS: Basilar crackles, no wheezes   HEART: Regular rate and rhythm without murmurs   GI: Soft, nontender   EXTREMITIES: Pitting edema.  No cyanosis.   NEURO/PSYCH: Awake and alert.  Follows commands    Results from last 7 days   Lab Units 07/09/19  0341 07/08/19  6218  07/07/19  0423   WBC 10*3/mm3 19.08* 22.38* 16.30*   HEMOGLOBIN g/dL 8.4* 8.8* 8.6*   PLATELETS 10*3/mm3 226 255 274     Results from last 7 days   Lab Units 07/09/19  0341 07/08/19  0326 07/07/19  0423 07/06/19  1840 07/06/19  0537   SODIUM mmol/L 135* 134* 134* 134* 141   POTASSIUM mmol/L 4.5 4.3 4.7 5.1 4.7   CO2 mmol/L 19.0* 19.0* 20.0* 16.0* 23.0   BUN mg/dL 36* 33* 38* 39* 38*   CREATININE mg/dL 1.49* 1.82* 1.91* 1.85* 1.67*   MAGNESIUM mg/dL 2.0 2.0  --   --  2.3   PHOSPHORUS mg/dL 2.8  --  3.8 4.1 5.2*   GLUCOSE mg/dL 142* 194* 182* 147* 125*     Estimated Creatinine Clearance: 21.9 mL/min (A) (by C-G formula based on SCr of 1.49 mg/dL (H)).    Results from last 7 days   Lab Units 07/06/19  0537   HEMOGLOBIN A1C % 5.50           Lab Results   Component Value Date    LACTATE 1.6 07/08/2019          Images: Chest x-ray revealed right lower lobe infiltrate    I reviewed the patient's results and images.     Impression      Active Hospital Problems    Diagnosis   • RLL Infiltrate (R/O Aspiration pneumonia)   • Acute ischemic leg, s/p thrombectomy (Dr. Dillon, 7/5/19)   • Renal insufficiency, acute versus chronic   • Chronic Tachycardia   • Anemia   • Arterial occlusion, lower extremity (CMS/[facility])         Plan        Continue empiric antibiotics for presumed aspiration pneumonia  Mobilize  Advance diet  Daughter might be interested in palliative care for symptom management     Plan of care and goals reviewed with Multidisciplinary Team and Antibiotic Stewardship rounds   I discussed the patient's findings and my recommendations with patient and nursing staff      .     BLACK Monique MD  Pulmonary and Critical Care Medicine

## 2019-07-09 NOTE — PROGRESS NOTES
[facility]       LOS: 4 days   Patient Care Team:  Anand Akins MD as PCP - General (Family Medicine)        Subjective       Patient Denies:  Cp, sob, palpitations.  Up in chair      Vital Signs  Temp:  [97.7 °F (36.5 °C)-98.5 °F (36.9 °C)] 98.2 °F (36.8 °C)  Heart Rate:  [65-91] (P) 68  Resp:  [16-22] (P) 16  BP: (120-173)/(58-91) (P) 178/62    Intake/Output Summary (Last 24 hours) at 7/9/2019 1108  Last data filed at 7/9/2019 0600  Gross per 24 hour   Intake 2714 ml   Output 850 ml   Net 1864 ml     No intake/output data recorded.    Physical Exam:     General Appearance:    Alert, cooperative, in no acute distress       Neck:   No adenopathy, supple, trachea midline, no thyromegaly, no JVD       Lungs:     Clear to auscultation,respirations regular, even and                  unlabored    Heart:    Regular rhythm and normal rate, normal S1 and S2, no            murmur, no gallop, no rub, no click   Chest Wall:    No abnormalities observed   Abdomen:     Normal bowel sounds, no masses, no organomegaly, soft        non-tender, non-distended       Extremities:   Moves all extremities well, no edema, no cyanosis, no             redness   Pulses:   Pulses palpable and equal bilaterally     Results Review:     I reviewed the patient's new clinical results.      WBC WBC   Date/Time Value Ref Range Status   07/09/2019 0341 19.08 (H) 3.40 - 10.80 10*3/mm3 Final   07/08/2019 0326 22.38 (H) 3.40 - 10.80 10*3/mm3 Final   07/07/2019 0423 16.30 (H) 3.40 - 10.80 10*3/mm3 Final   07/06/2019 1840 16.12 (H) 3.40 - 10.80 10*3/mm3 Final            HGB Hemoglobin   Date/Time Value Ref Range Status   07/09/2019 0341 8.4 (L) 12.0 - 15.9 g/dL Final   07/08/2019 0326 8.8 (L) 12.0 - 15.9 g/dL Final   07/07/2019 0423 8.6 (L) 12.0 - 15.9 g/dL Final   07/06/2019 1840 10.3 (L) 12.0 - 15.9 g/dL Final           HCT Hematocrit   Date/Time Value Ref Range Status   07/09/2019  0341 29.4 (L) 34.0 - 46.6 % Final   07/08/2019 0326 30.5 (L) 34.0 - 46.6 % Final   07/07/2019 0423 30.2 (L) 34.0 - 46.6 % Final   07/06/2019 1840 38.6 34.0 - 46.6 % Final            Platlets Platelets   Date/Time Value Ref Range Status   07/09/2019 0341 226 140 - 450 10*3/mm3 Final   07/08/2019 0326 255 140 - 450 10*3/mm3 Final   07/07/2019 0423 274 140 - 450 10*3/mm3 Final   07/06/2019 1840 310 140 - 450 10*3/mm3 Final     Sodium  Sodium   Date/Time Value Ref Range Status   07/09/2019 0341 135 (L) 136 - 145 mmol/L Final   07/08/2019 0326 134 (L) 136 - 145 mmol/L Final   07/07/2019 0423 134 (L) 136 - 145 mmol/L Final   07/06/2019 1840 134 (L) 136 - 145 mmol/L Final     Potassium  Potassium   Date/Time Value Ref Range Status   07/09/2019 0341 4.5 3.5 - 5.2 mmol/L Final   07/08/2019 0326 4.3 3.5 - 5.2 mmol/L Final   07/07/2019 0423 4.7 3.5 - 5.2 mmol/L Final   07/06/2019 1840 5.1 3.5 - 5.2 mmol/L Final     Chloride  Chloride   Date/Time Value Ref Range Status   07/09/2019 0341 104 98 - 107 mmol/L Final   07/08/2019 0326 101 98 - 107 mmol/L Final   07/07/2019 0423 100 98 - 107 mmol/L Final   07/06/2019 1840 101 98 - 107 mmol/L Final     BicarbonateNo results found for: PLASMABICARB    BUN BUN   Date/Time Value Ref Range Status   07/09/2019 0341 36 (H) 8 - 23 mg/dL Final   07/08/2019 0326 33 (H) 8 - 23 mg/dL Final   07/07/2019 0423 38 (H) 8 - 23 mg/dL Final   07/06/2019 1840 39 (H) 8 - 23 mg/dL Final      Creatinine Creatinine   Date/Time Value Ref Range Status   07/09/2019 0341 1.49 (H) 0.57 - 1.00 mg/dL Final   07/08/2019 0326 1.82 (H) 0.57 - 1.00 mg/dL Final   07/07/2019 0423 1.91 (H) 0.57 - 1.00 mg/dL Final   07/06/2019 1840 1.85 (H) 0.57 - 1.00 mg/dL Final      Calcium Calcium   Date/Time Value Ref Range Status   07/09/2019 0341 8.4 8.2 - 9.6 mg/dL Final   07/08/2019 0326 8.7 8.2 - 9.6 mg/dL Final   07/07/2019 0423 8.6 8.2 - 9.6 mg/dL Final   07/06/2019 1840 9.0 8.2 - 9.6 mg/dL Final      Mag @RESULFAST(MG:3)@         PT/INR:       Lab Results   Component Value Date    PROTIME 17.3 (H) 07/05/2019    INR 1.48 (H) 07/05/2019      Troponin I:  No results found for: TROPONINI Lab Results   Component Value Date    CKTOTAL 68 07/06/2019    TROPONINT <0.010 07/08/2019         aspirin 325 mg Oral Daily   atenolol 25 mg Oral Q24H   clopidogrel 75 mg Oral Daily   insulin lispro 0-7 Units Subcutaneous 4x Daily With Meals & Nightly   latanoprost 1 drop Both Eyes Nightly   piperacillin-tazobactam 3.375 g Intravenous Q12H       sodium chloride 100 mL/hr Last Rate: 100 mL/hr (07/08/19 1047)       Assessment/Plan     Patient Active Problem List   Diagnosis Code   • Acute ischemic leg, s/p thrombectomy (Dr. Dillon, 7/5/19) I99.8   • Renal insufficiency, acute versus chronic N28.9   • Chronic Tachycardia R00.0   • Anemia D64.9   • Arterial occlusion, lower extremity (CMS/HCC) I70.209   • RLL Infiltrate (R/O Aspiration pneumonia) R91.8   Normal EF    CV stable  Continue beta blocker  Increase bp meds  Transfer to telemetry  Hopefully to [facility] for rehab    RAYO Esteban  07/09/19  11:08 AM

## 2019-07-09 NOTE — THERAPY EVALUATION
Acute Care - Occupational Therapy Initial Evaluation   [place]     Patient Name: Bettye Sargent  : 3/28/1927  MRN: [id number]  Today's Date: 2019  Onset of Illness/Injury or Date of Surgery: 19  Date of Referral to OT: 19  Referring Physician: Candido Dillon MD    Admit Date: 2019       ICD-10-CM ICD-9-CM   1. Arterial occlusion, lower extremity (CMS/HCC) I70.209 444.22   2. Impaired functional mobility, balance, gait, and endurance Z74.09 V49.89   3. Impaired mobility and ADLs Z74.09 799.89     Patient Active Problem List   Diagnosis   • Acute ischemic leg, s/p thrombectomy (Dr. Dillon, 19)   • Renal insufficiency, acute versus chronic   • Chronic Tachycardia   • Anemia   • Arterial occlusion, lower extremity (CMS/HCC)   • RLL Infiltrate (R/O Aspiration pneumonia)     Past Medical History:   Diagnosis Date   • Neuropathy    • Shingles      Past Surgical History:   Procedure Laterality Date   • FEMORAL THROMBECTOMY/EMBOLECTOMY Right 2019    Procedure: Right Femoral artery cutdown with Angioplasty and stent of the proximal right common iliac artery;  Surgeon: Candido Dillon MD;  Location: [place] [address];  Service: Cardiothoracic          OT ASSESSMENT FLOWSHEET (last 12 hours)      Occupational Therapy Evaluation     Row Name 19 0945                   OT Evaluation Time/Intention    Subjective Information  complains of;weakness;fatigue;pain  -HK        Document Type  evaluation  -HK        Mode of Treatment  individual therapy;occupational therapy  -HK        Patient Effort  good  -HK        Symptoms Noted During/After Treatment  none  -HK           General Information    Patient Profile Reviewed?  yes  -HK        Onset of Illness/Injury or Date of Surgery  19  -HK        Referring Physician  MD Santana   -HK        Patient Observations  alert;cooperative;agree to therapy  -HK        Patient/Family Observations  Granddaughter at bedside.   -HK         General Observations of Patient  Pt receieved supine in bed with IV and O2 intact.   -HK        Prior Level of Function  independent:;community mobility;all household mobility;gait;transfer;ADL's;bed mobility  -HK        Equipment Currently Used at Home  shower chair;walker, rolling  -HK        Pertinent History of Current Functional Problem  Pt is a 89 YOF who presents to [facility] with complaints of R groin pain that radiated down to R foot. Pt underwent R femoral artery cutdown procedure c catheter in aorta and angioplasty/stent of proximal R common iliac artery.   -HK        Existing Precautions/Restrictions  fall;oxygen therapy device and L/min  -HK        Risks Reviewed  patient and family:;LOB;nausea/vomiting;increased discomfort;change in vital signs;increased drainage;lines disloged;dizziness  -HK        Benefits Reviewed  patient and family:;improve function;increase independence;increase balance;increase strength;decrease pain;decrease risk of DVT;increase knowledge;improve skin integrity  -HK           Relationship/Environment    Primary Source of Support/Comfort  child(trisha)  -HK        Lives With  facility resident  -HK           Resource/Environmental Concerns    Current Living Arrangements  independent/assisted living facility  -HK           Cognitive Assessment/Interventions    Additional Documentation  Cognitive Assessment/Intervention (Group)  -HK           Cognitive Assessment/Intervention- PT/OT    Affect/Mental Status (Cognitive)  flat/blunted affect  -HK        Orientation Status (Cognition)  oriented x 4  -HK        Follows Commands (Cognition)  follows one step commands;over 90% accuracy  -HK        Cognitive Function (Cognitive)  safety deficit  -HK        Safety Deficit (Cognitive)  judgment;problem solving;safety precautions awareness;safety precautions follow-through/compliance;insight into deficits/self awareness;awareness of need for assistance;at risk behavior observed;ability to follow  commands;mild deficit  -HK        Personal Safety Interventions  fall prevention program maintained;gait belt;nonskid shoes/slippers when out of bed  -HK           Safety Issues, Functional Mobility    Safety Issues Affecting Function (Mobility)  ability to follow commands;safety precautions follow-through/compliance;safety precaution awareness;judgment;insight into deficits/self awareness  -HK        Impairments Affecting Function (Mobility)  balance;coordination;endurance/activity tolerance;strength;pain  -HK           Bed Mobility Assessment/Treatment    Bed Mobility Assessment/Treatment  scooting/bridging;supine-sit  -HK        Scooting/Bridging [place] (Bed Mobility)  maximum assist (25% patient effort);verbal cues;2 person assist  -HK        Supine-Sit [place] (Bed Mobility)  maximum assist (25% patient effort);2 person assist;verbal cues  -HK        Bed Mobility, Safety Issues  decreased use of arms for pushing/pulling;decreased use of legs for bridging/pushing;impaired trunk control for bed mobility  -HK        Assistive Device (Bed Mobility)  head of bed elevated;bed rails;draw sheet  -HK        Comment (Bed Mobility)  Pt required maxA to advance to EOB   -HK           Functional Mobility    Functional Mobility- Ind. Level  not tested  -HK           Transfer Assessment/Treatment    Transfer Assessment/Treatment  sit-stand transfer;stand-sit transfer;stand pivot/stand step transfer  -HK        Comment (Transfers)  Verbal cues for safe hand placement and sequencing. Cues for safe use of RW.    -HK           Sit-Stand Transfer    Sit-Stand [place] (Transfers)  minimum assist (75% patient effort);2 person assist;verbal cues  -HK        Assistive Device (Sit-Stand Transfers)  walker, front-wheeled  -HK           Stand-Sit Transfer    Stand-Sit [place] (Transfers)  minimum assist (75% patient effort);2 person assist;verbal cues  -HK        Assistive Device (Stand-Sit Transfers)  walker,  front-wheeled  -HK           Stand Pivot/Stand Step Transfer    Stand Pivot/Stand Step [place]  minimum assist (75% patient effort);verbal cues  -HK        Assistive Device (Stand Pivot Stand Step Transfer)  walker, front-wheeled  -HK           ADL Assessment/Intervention    BADL Assessment/Intervention  lower body dressing  -HK           Lower Body Dressing Assessment/Training    Comment (Lower Body Dressing)  Pt dependent for all LBD this date.   -HK           BADL Safety/Performance    Impairments, BADL Safety/Performance  balance;endurance/activity tolerance;pain;range of motion;strength;trunk/postural control  -HK        Skilled BADL Treatment/Intervention  adaptive equipment training;BADL process/adaptation training  -HK           General ROM    RT Upper Ext  Rt Shoulder Flexion  -HK        LT Upper Ext  Lt Shoulder Flexion  -HK           Right Upper Ext    Rt Shoulder Flexion AROM  grossly 100 degrees AROM   -HK        Rt Shoulder Flexion PROM  grossly 150 degrees PROM   -HK           Left Upper Ext    Lt Shoulder Flexion AROM  grossly 100 degrees AROM   -HK        Lt Shoulder Flexion PROM  grossly 150 degrees PROM   -HK           MMT (Manual Muscle Testing)    Rt Upper Ext  Rt Shoulder Flexion  -HK        Lt Upper Ext  Lt Shoulder Flexion  -HK           MMT Right Upper Ext    Rt Shoulder Flexion MMT, Gross Movement  (3/5) fair  -HK           MMT Left Upper Ext    Lt Shoulder Flexion MMT, Gross Movement  (3/5) fair  -HK           Motor Assessment/Interventions    Additional Documentation  Balance (Group)  -HK           Balance    Balance  static sitting balance;static standing balance  -HK           Static Sitting Balance    Level of [place] (Unsupported Sitting, Static Balance)  contact guard assist  -HK        Sitting Position (Unsupported Sitting, Static Balance)  sitting on edge of bed  -HK        Time Able to Maintain Position (Unsupported Sitting, Static Balance)  more than 5 minutes  -HK            Static Standing Balance    Level of [place] (Supported Standing, Static Balance)  contact guard assist  -HK        Time Able to Maintain Position (Supported Standing, Static Balance)  1 to 2 minutes  -HK        Assistive Device Utilized (Supported Standing, Static Balance)  walker, rolling  -HK           Sensory Assessment/Intervention    Sensory General Assessment  no sensation deficits identified  -HK           Positioning and Restraints    Pre-Treatment Position  in bed  -HK        Post Treatment Position  chair  -HK        In Chair  notified nsg;reclined;call light within reach;encouraged to call for assist;exit alarm on  -HK           Pain Assessment    Additional Documentation  Pain Scale: Numbers Pre/Post-Treatment (Group)  -HK           Pain Scale: Numbers Pre/Post-Treatment    Pain Scale: Numbers, Pretreatment  10/10  -HK        Pain Scale: Numbers, Post-Treatment  9/10  -HK        Pain Location - Side  Right  -HK        Pain Location - Orientation  anterior  -HK        Pain Location  groin  -HK        Pain Intervention(s)  Repositioned;Ambulation/increased activity  -HK           Wound 07/05/19 2340 Right groin incision    Wound - Properties Group Date first assessed: 07/05/19  -JS Time first assessed: 2340  -JS Side: Right  -JS Location: groin  -JS Type: incision  -JS Additional Comments: right groin [employer]  -JR       Coping    Observed Emotional State  accepting;calm;cooperative  -HK           Clinical Impression (OT)    Date of Referral to OT  07/09/19  -HK        OT Diagnosis  Decreased independence with ADLS and Mobility   -HK        Patient/Family Goals Statement (OT Eval)  Pt would like to improve and return home.   -HK        Criteria for Skilled Therapeutic Interventions Met (OT Eval)  yes;treatment indicated  -HK        Rehab Potential (OT Eval)  good, to achieve stated therapy goals  -HK        Therapy Frequency (OT Eval)  daily  -HK        Care Plan Review (OT)   evaluation/treatment results reviewed;care plan/treatment goals reviewed;risks/benefits reviewed;patient/other agree to care plan  -HK        Care Plan Review, Other Participant (OT Eval)  other (see comments) granddaugther   -HK        Anticipated Discharge Disposition (OT)  inpatient rehabilitation facility  -HK           Vital Signs    Pre Systolic BP Rehab  173  -HK        Pre Treatment Diastolic BP  71  -HK        Post Systolic BP Rehab  167  -HK        Post Treatment Diastolic BP  72  -HK        Pretreatment Heart Rate (beats/min)  71  -HK        Posttreatment Heart Rate (beats/min)  110  -HK        Pre Patient Position  Supine  -HK        Intra Patient Position  Standing  -HK        Post Patient Position  Sitting  -HK           OT Goals    Bed Mobility Goal Selection (OT)  bed mobility, OT goal 1  -HK        Transfer Goal Selection (OT)  transfer, OT goal 1  -HK        Toileting Goal Selection (OT)  toileting, OT goal 1  -HK        Grooming Goal Selection (OT)  grooming, OT goal 1  -HK        Additional Documentation  Grooming Goal Selection (OT) (Row)  -HK           Bed Mobility Goal 1 (OT)    Activity/Assistive Device (Bed Mobility Goal 1, OT)  sit to supine/supine to sit;scooting  -HK        [place] Level/Cues Needed (Bed Mobility Goal 1, OT)  moderate assist (50-74% patient effort);verbal cues required  -HK        Time Frame (Bed Mobility Goal 1, OT)  5 days  -HK        Progress/Outcomes (Bed Mobility Goal 1, OT)  goal ongoing  -HK           Transfer Goal 1 (OT)    Activity/Assistive Device (Transfer Goal 1, OT)  sit-to-stand/stand-to-sit;toilet  -HK        [place] Level/Cues Needed (Transfer Goal 1, OT)  contact guard assist  -HK        Time Frame (Transfer Goal 1, OT)  5 days  -HK        Progress/Outcome (Transfer Goal 1, OT)  goal ongoing  -HK           Toileting Goal 1 (OT)    Activity/Device (Toileting Goal 1, OT)  toileting skills, all  -HK        [place] Level/Cues Needed (Toileting  Goal 1, OT)  moderate assist (50-74% patient effort);verbal cues required  -HK        Time Frame (Toileting Goal 1, OT)  5 days  -HK        Progress/Outcome (Toileting Goal 1, OT)  goal ongoing  -HK           Grooming Goal 1 (OT)    Activity/Device (Grooming Goal 1, OT)  hair care;oral care;wash face, hands  -HK        [place] (Grooming Goal 1, OT)  minimum assist (75% or more patient effort);verbal cues required  -HK        Time Frame (Grooming Goal 1, OT)  5 days  -HK        Progress/Outcome (Grooming Goal 1, OT)  goal ongoing  -HK           Living Environment    Home Accessibility  wheelchair accessible  -HK          User Key  (r) = Recorded By, (t) = Taken By, (c) = Cosigned By    Initials Name Effective Dates    JR Lissy Landa, RN 06/16/16 -     Jodie Mcclure RN 01/23/17 -     Carolina Sullivan, OT 03/07/18 -          Occupational Therapy Education     Title: PT OT SLP Therapies (In Progress)     Topic: Occupational Therapy (In Progress)     Point: ADL training (Done)     Description: Instruct learner(s) on proper safety adaptation and remediation techniques during self care or transfers.   Instruct in proper use of assistive devices.    Learning Progress Summary           Patient Acceptance, E, VU by  at 7/9/2019  9:45 AM    Comment:  Pt educated on ADL retraining with bed mobility and transfers, safety precautions and appropriate body mechanics.                   Point: Precautions (Done)     Description: Instruct learner(s) on prescribed precautions during self-care and functional transfers.    Learning Progress Summary           Patient Acceptance, E, VU by  at 7/9/2019  9:45 AM    Comment:  Pt educated on ADL retraining with bed mobility and transfers, safety precautions and appropriate body mechanics.                   Point: Body mechanics (Done)     Description: Instruct learner(s) on proper positioning and spine alignment during self-care, functional mobility activities and/or  exercises.    Learning Progress Summary           Patient Acceptance, ZOHRA, VU by  at 7/9/2019  9:45 AM    Comment:  Pt educated on ADL retraining with bed mobility and transfers, safety precautions and appropriate body mechanics.                               User Key     Initials Effective Dates Name Provider Type Discipline     03/07/18 -  Carolina Lizarraga, OT Occupational Therapist OT                  OT Recommendation and Plan  Outcome Summary/Treatment Plan (OT)  Anticipated Discharge Disposition (OT): inpatient rehabilitation facility  Therapy Frequency (OT Eval): daily  Plan of Care Review  Plan of Care Reviewed With: patient, grandchild(trisha)  Plan of Care Reviewed With: patient, grandchild(trisha)  Outcome Summary: OT eval complete. Pt limited by pain, flat affect and fatigue. Pt requires maxA for bed mobility and minAx2 for all transfers and mobility with use of RW. Pt unable to attempt to complete LBD this date. Pt with decreased BUE strength and ROM affecting completion of all ADLS and functional mobility. Recommend d/c to IPR.     Outcome Measures     Row Name 07/09/19 1110 07/09/19 0945          How much help from another person do you currently need...    Turning from your back to your side while in flat bed without using bedrails?  2  -BOB (r) KL (t) BOB (c)  --     Moving from lying on back to sitting on the side of a flat bed without bedrails?  2  -BOB (r) KL (t) BOB (c)  --     Moving to and from a bed to a chair (including a wheelchair)?  3  -BOB (r) KL (t) BOB (c)  --     Standing up from a chair using your arms (e.g., wheelchair, bedside chair)?  3  -BOB (r) KL (t) BOB (c)  --     Climbing 3-5 steps with a railing?  2  -BOB (r) KL (t) BOB (c)  --     To walk in hospital room?  3  -BOB (r) KL (t) BOB (c)  --     AM-PAC 6 Clicks Score (PT)  15  -BOB (r) KL (t)  --        How much help from another is currently needed...    Putting on and taking off regular lower body clothing?  --  1  -     Bathing (including  washing, rinsing, and drying)  --  2  -HK     Toileting (which includes using toilet bed pan or urinal)  --  2  -HK     Putting on and taking off regular upper body clothing  --  3  -HK     Taking care of personal grooming (such as brushing teeth)  --  3  -HK     Eating meals  --  3  -HK     AM-PAC 6 Clicks Score (OT)  --  14  -HK        Functional Assessment    Outcome Measure Options  AM-PAC 6 Clicks Basic Mobility (PT)  -BOB (r) KL (t) BOB (c)  AM-PAC 6 Clicks Daily Activity (OT)  -HK       User Key  (r) = Recorded By, (t) = Taken By, (c) = Cosigned By    Initials Name Provider Type    Maye Otero, PT Physical Therapist    Carolina Sullivan, OT Occupational Therapist    Joaquin Crabtree, PT Student PT Student          Time Calculation:   Time Calculation- OT     Row Name 07/09/19 1110 07/09/19 0945          Time Calculation- OT    OT Start Time  --  0945  -     OT Received On  --  07/09/19  -     OT Goal Re-Cert Due Date  --  07/19/19  -        Timed Charges    79720 - Gait Training Minutes   9  -BOB (r) KL (t) BOB (c)  --       User Key  (r) = Recorded By, (t) = Taken By, (c) = Cosigned By    Initials Name Provider Type    Maye Otero, PT Physical Therapist    Carolina Sullivan, OT Occupational Therapist    Joaquin Crabtree, PT Student PT Student        Therapy Charges for Today     Code Description Service Date Service Provider Modifiers Qty    [id number]  OT EVAL LOW COMPLEXITY 4 7/9/2019 Carolina Lizarraga, OT GO 1               Carolina Lizarraga OT  7/9/2019

## 2019-07-09 NOTE — PLAN OF CARE
Problem: Patient Care Overview  Goal: Plan of Care Review  Outcome: Ongoing (interventions implemented as appropriate)   07/09/19 2917   Coping/Psychosocial   Plan of Care Reviewed With patient   Plan of Care Review   Progress improving   OTHER   Outcome Summary VSS, Complains of mild to moderate pain consistantly in right leg, no N/V, NPO, but able to take pills with water, adequate UOP, able to easily doppler pedal pulses, Dr Dillon ok to transfer to tele.

## 2019-07-10 NOTE — THERAPY TREATMENT NOTE
Acute Care - Physical Therapy Treatment Note  [facility]     Patient Name: Bettye Sargent  : 3/28/1927  MRN: [id number]  Today's Date: 7/10/2019  Onset of Illness/Injury or Date of Surgery: 19  Date of Referral to PT: 19  Referring Physician: Candido Dillon MD    Admit Date: 2019    Visit Dx:    ICD-10-CM ICD-9-CM   1. Arterial occlusion, lower extremity (CMS/HCC) I70.209 444.22   2. Impaired functional mobility, balance, gait, and endurance Z74.09 V49.89   3. Impaired mobility and ADLs Z74.09 799.89     Patient Active Problem List   Diagnosis   • Acute ischemic leg, s/p thrombectomy (Dr. Dillon, 19)   • Renal insufficiency, acute versus chronic   • Chronic Tachycardia   • Anemia   • Arterial occlusion, lower extremity (CMS/HCC)   • RLL Infiltrate (R/O Aspiration pneumonia)       Therapy Treatment    Rehabilitation Treatment Summary     Row Name 07/10/19 0849             Treatment Time/Intention    Discipline  physical therapist  -KR      Document Type  therapy note (daily note)  -KR      Subjective Information  complains of;weakness;fatigue;pain  -KR      Mode of Treatment  physical therapy  -KR      Care Plan Review  care plan/treatment goals reviewed;risks/benefits reviewed;patient/other agree to care plan  -KR      Therapy Frequency (PT Clinical Impression)  daily  -KR      Patient Effort  poor  -KR      Existing Precautions/Restrictions  fall  -KR      Recorded by [KR] Nina Stoddard, PT 07/10/19 1149      Row Name 07/10/19 0849             Vital Signs    Pre Systolic BP Rehab  169  -KR      Pre Treatment Diastolic BP  85  -KR      Post Systolic BP Rehab  198  -KR      Post Treatment Diastolic BP  66  -KR      Pretreatment Heart Rate (beats/min)  82  -KR      Posttreatment Heart Rate (beats/min)  87  -KR      Pre SpO2 (%)  94  -KR      O2 Delivery Pre Treatment  room air  -KR      Post SpO2 (%)  95  -KR      O2 Delivery Post Treatment  room air  -KR      Pre Patient Position   Supine  -KR      Intra Patient Position  Standing  -KR      Post Patient Position  Sitting  -KR      Recorded by [KR] Nina Stoddard, PT 07/10/19 1149      Row Name 07/10/19 0849             Cognitive Assessment/Intervention    Additional Documentation  Cognitive Assessment/Intervention (Group)  -KR      Recorded by [KR] Nina Stoddard, PT 07/10/19 1149      Row Name 07/10/19 0849             Cognitive Assessment/Intervention- PT/OT    Affect/Mental Status (Cognitive)  anxious  -KR      Orientation Status (Cognition)  oriented to;person;place;situation  -KR      Follows Commands (Cognition)  follows one step commands;50-74% accuracy;repetition of directions required;verbal cues/prompting required  -KR      Cognitive Function (Cognitive)  safety deficit  -KR      Safety Deficit (Cognitive)  mild deficit;awareness of need for assistance;insight into deficits/self awareness;safety precautions awareness;safety precautions follow-through/compliance  -KR      Personal Safety Interventions  fall prevention program maintained;gait belt;nonskid shoes/slippers when out of bed  -KR      Recorded by [KR] Nina Stoddard, PT 07/10/19 1149      Row Name 07/10/19 0849             Safety Issues, Functional Mobility    Safety Issues Affecting Function (Mobility)  awareness of need for assistance;insight into deficits/self awareness;safety precaution awareness;safety precautions follow-through/compliance  -KR      Impairments Affecting Function (Mobility)  balance;coordination;endurance/activity tolerance;pain;strength  -KR      Recorded by [KR] Nina Stoddard, PT 07/10/19 1149      Row Name 07/10/19 0849             Bed Mobility Assessment/Treatment    Bed Mobility Assessment/Treatment  supine-sit  -KR      Supine-Sit [place] (Bed Mobility)  moderate assist (50% patient effort);2 person assist;verbal cues  -KR      Bed Mobility, Safety Issues  decreased use of arms for pushing/pulling;decreased use of legs for  bridging/pushing  -KR      Assistive Device (Bed Mobility)  draw sheet;head of bed elevated  -KR      Comment (Bed Mobility)  VC's for sequencing and hand placement. Pt required increased assistance at trunk and R LE.   -KR      Recorded by [KR] Nina Stoddard, PT 07/10/19 1149      Row Name 07/10/19 0849             Transfer Assessment/Treatment    Transfer Assessment/Treatment  sit-stand transfer;stand-sit transfer  -KR      Comment (Transfers)  VC's for sequencing and hand placement. Upon initial stand pt demonstrated significant forward flexed posture; minimal improvement despite increased cueing to correct.   -KR      Recorded by [KR] Nina Stoddard, PT 07/10/19 1149      Row Name 07/10/19 0849             Sit-Stand Transfer    Sit-Stand [place] (Transfers)  moderate assist (50% patient effort);verbal cues  -KR      Assistive Device (Sit-Stand Transfers)  walker, front-wheeled  -KR      Recorded by [KR] Nina Stoddard, PT 07/10/19 1149      Row Name 07/10/19 0849             Stand-Sit Transfer    Stand-Sit [place] (Transfers)  moderate assist (50% patient effort);verbal cues  -KR      Assistive Device (Stand-Sit Transfers)  walker, front-wheeled  -KR      Recorded by [KR] Nina Stoddard, PT 07/10/19 1149      Row Name 07/10/19 0849             Gait/Stairs Assessment/Training    Gait/Stairs Assessment/Training  gait/ambulation assistive device  -KR      [place] Level (Gait)  moderate assist (50% patient effort);2 person assist;verbal cues  -KR      Assistive Device (Gait)  walker, front-wheeled  -KR      Distance in Feet (Gait)  30  -KR      Pattern (Gait)  step-to  -KR      Deviations/Abnormal Patterns (Gait)  base of support, wide;opal decreased;festinating/shuffling;other (see comments) decreased step length  -KR      Bilateral Gait Deviations  forward flexed posture;heel strike decreased  -KR      Comment (Gait/Stairs)  Pt demonstrated step to gait pattern with wide KATIE and very forward  flexed posture. Pt demonstrated significant lateral lean to the R. No improvement in posture despite max cueing. Pt took multiple standing rest breaks with tendency to lean onto RW. Pt required max encouragement to participate; pt declined additional ambulation.  -KR      Recorded by [KR] Nina Stoddard, PT 07/10/19 1149      Row Name 07/10/19 0849             Motor Skills Assessment/Interventions    Additional Documentation  Balance (Group);Therapeutic Exercise (Group)  -KR      Recorded by [KR] Nina Stoddard, PT 07/10/19 1149      Row Name 07/10/19 0849             Therapeutic Exercise    25383 - PT Therapeutic Activity Minutes  23  -KR      Recorded by [KR] Nina Stoddard, PT 07/10/19 1149      Row Name 07/10/19 0849             Balance    Balance  static sitting balance;static standing balance  -KR      Recorded by [KR] Nina Stoddard, PT 07/10/19 1149      Row Name 07/10/19 0849             Static Sitting Balance    Level of [place] (Unsupported Sitting, Static Balance)  contact guard assist  -KR      Sitting Position (Unsupported Sitting, Static Balance)  sitting on edge of bed  -KR      Recorded by [KR] Nina Stoddard, PT 07/10/19 1149      Row Name 07/10/19 0849             Static Standing Balance    Level of [place] (Supported Standing, Static Balance)  moderate assist, 50 to 74% patient effort;2 person assist pt regressed to periods of maxA x2  -KR      Assistive Device Utilized (Supported Standing, Static Balance)  walker, rolling  -KR      Recorded by [KR] Nina Stoddard, PT 07/10/19 1149      Row Name 07/10/19 0849             Positioning and Restraints    Pre-Treatment Position  in bed  -KR      Post Treatment Position  chair  -KR      In Chair  notified nsg;reclined;call light within reach;encouraged to call for assist;waffle cushion;on mechanical lift sling;legs elevated  -KR      Recorded by [KR] Nina Stoddard, PT 07/10/19 1149      Row Name 07/10/19 0849             Pain  Assessment    Additional Documentation  Pain Scale: Numbers Pre/Post-Treatment (Group)  -KR      Recorded by [KR] Nina Stoddard, PT 07/10/19 1149      Row Name 07/10/19 0849             Pain Scale: Numbers Pre/Post-Treatment    Pain Scale: Numbers, Pretreatment  10/10  -KR      Pain Scale: Numbers, Post-Treatment  10/10  -KR      Pain Location - Side  Right  -KR      Pain Location - Orientation  lower  -KR      Pain Location  extremity  -KR      Pain Intervention(s)  Repositioned;Ambulation/increased activity  -KR      Recorded by [KR] Nina Stoddard, PT 07/10/19 1149      Row Name                Wound 07/05/19 2340 Right groin incision    Wound - Properties Group Date first assessed: 07/05/19 [JS] Time first assessed: 2340 [JS] Side: Right [JS] Location: groin [JS] Type: incision [JS] Additional Comments: right groin Aquacel AG [JR] Recorded by:  [] Lissy Landa RN 07/05/19 2340 [JS] Jodie Burr RN 07/05/19 2340    Row Name 07/10/19 0849             Plan of Care Review    Plan of Care Reviewed With  patient  -KR      Recorded by [KR] Nina Stoddard, PT 07/10/19 1149      Row Name 07/10/19 0849             Outcome Summary/Treatment Plan (PT)    Daily Summary of Progress (PT)  progress toward functional goals is gradual  -KR      Recorded by [KR] Nina Stoddard, PT 07/10/19 1149        User Key  (r) = Recorded By, (t) = Taken By, (c) = Cosigned By    Initials Name Effective Dates Discipline    JR Lissy Landa RN 06/16/16 -  Nurse    Jodie Mcclure RN 01/23/17 -  Nurse    Nina Perry, PT 04/03/18 -  PT          Wound 07/05/19 2340 Right groin incision (Active)   Dressing Appearance dry;intact;no drainage 7/10/2019 11:00 AM   Closure MARIA M 7/10/2019 11:00 AM   Periwound intact;dry;blanchable 7/10/2019 11:00 AM   Drainage Amount none 7/10/2019 11:00 AM   Dressing Care, Wound other (see comments) 7/10/2019  8:00 AM           Physical Therapy Education     Title: PT OT SLP Therapies (In  Progress)     Topic: Physical Therapy (In Progress)     Point: Mobility training (In Progress)     Learning Progress Summary           Patient Acceptance, E, NR by KR at 7/10/2019  8:49 AM    Acceptance, TB, NR by  at 7/9/2019 11:10 AM                   Point: Home exercise program (In Progress)     Learning Progress Summary           Patient Acceptance, E, NR by KR at 7/10/2019  8:49 AM    Acceptance, TB, NR by KL at 7/9/2019 11:10 AM                   Point: Body mechanics (In Progress)     Learning Progress Summary           Patient Acceptance, E, NR by KR at 7/10/2019  8:49 AM    Acceptance, TB, NR by KL at 7/9/2019 11:10 AM                   Point: Precautions (In Progress)     Learning Progress Summary           Patient Acceptance, E, NR by KR at 7/10/2019  8:49 AM    Acceptance, TB, NR by  at 7/9/2019 11:10 AM                               User Key     Initials Effective Dates Name Provider Type Discipline    KR 04/03/18 -  Nina Stoddard, PT Physical Therapist PT     05/15/19 -  Joaquin Jacob, PT Student PT Student PT                PT Recommendation and Plan  Therapy Frequency (PT Clinical Impression): daily  Outcome Summary/Treatment Plan (PT)  Daily Summary of Progress (PT): progress toward functional goals is gradual  Plan of Care Reviewed With: patient  Progress: improving  Outcome Summary: Pt increased ambulation distance to 30ft with RW and modA x2. Pt demonstrated slow opal with very forward flexed posture and short, small steps. No improvement despite increased cueing. Pt required max encouragement to participate; declined additional ambulation. Continue to progress as appropriate.   Outcome Measures     Row Name 07/10/19 0849 07/09/19 1110 07/09/19 0945       How much help from another person do you currently need...    Turning from your back to your side while in flat bed without using bedrails?  2  -KR  2  -BOB (r) YAMILET (t) BOB (c)  --    Moving from lying on back to sitting on the side  of a flat bed without bedrails?  2  -KR  2  -BOB (r) KL (t) BOB (c)  --    Moving to and from a bed to a chair (including a wheelchair)?  2  -KR  3  -BOB (r) KL (t) BOB (c)  --    Standing up from a chair using your arms (e.g., wheelchair, bedside chair)?  2  -KR  3  -BOB (r) KL (t) BOB (c)  --    Climbing 3-5 steps with a railing?  1  -KR  2  -BOB (r) KL (t) BOB (c)  --    To walk in hospital room?  2  -KR  3  -BOB (r) KL (t) BOB (c)  --    AM-PAC 6 Clicks Score (PT)  11  -KR  15  -BOB (r) KL (t)  --       How much help from another is currently needed...    Putting on and taking off regular lower body clothing?  --  --  1  -HK    Bathing (including washing, rinsing, and drying)  --  --  2  -HK    Toileting (which includes using toilet bed pan or urinal)  --  --  2  -HK    Putting on and taking off regular upper body clothing  --  --  3  -HK    Taking care of personal grooming (such as brushing teeth)  --  --  3  -HK    Eating meals  --  --  3  -HK    AM-PAC 6 Clicks Score (OT)  --  --  14  -HK       Functional Assessment    Outcome Measure Options  AM-PAC 6 Clicks Basic Mobility (PT)  -KR  AM-PAC 6 Clicks Basic Mobility (PT)  -BOB (r) KL (t) BOB (c)  AM-PAC 6 Clicks Daily Activity (OT)  -HK      User Key  (r) = Recorded By, (t) = Taken By, (c) = Cosigned By    Initials Name Provider Type    BOB Maye Michele, PT Physical Therapist    Nina Perry, PT Physical Therapist    HK Carolina Liazrraga, OT Occupational Therapist    Joaquin Crabtree, PT Student PT Student         Time Calculation:   PT Charges     Row Name 07/10/19 0818             Time Calculation    Start Time  0849  -KR      PT Received On  07/10/19  -KR      PT Goal Re-Cert Due Date  07/19/19  -KR         Time Calculation- PT    Total Timed Code Minutes- PT  23 minute(s)  -KR         Timed Charges    25740 - PT Therapeutic Activity Minutes  23  -KR        User Key  (r) = Recorded By, (t) = Taken By, (c) = Cosigned By    Initials Name Provider Type    KR Ring,  Nina OLIVERA, PT Physical Therapist        Therapy Charges for Today     Code Description Service Date Service Provider Modifiers Qty    [id number]  PT THERAPEUTIC ACT EA 15 MIN 7/10/2019 Nina Stoddard, PT GP 2    [id number] HC PT THER SUPP EA 15 MIN 7/10/2019 Nina Stoddard, PT GP 2          PT G-Codes  Outcome Measure Options: AM-PAC 6 Clicks Basic Mobility (PT)  AM-PAC 6 Clicks Score (PT): 11  AM-PAC 6 Clicks Score (OT): 14    Ronna Stoddard PT  7/10/2019

## 2019-07-10 NOTE — PROGRESS NOTES
CTS Progress Note       LOS: 5 days   Patient Care Team:  Anand Akins MD as PCP - General (Family Medicine)    Chief Complaint: Peripheral arterial vascular disease    Vital Signs:  Temp:  [98 °F (36.7 °C)-98.7 °F (37.1 °C)] 98.2 °F (36.8 °C)  Heart Rate:  [59-81] 77  Resp:  [16-24] 22  BP: (141-178)/(48-76) 173/73    Physical Exam:  Foot is viable with Doppler signals and posterior tibial dorsalis pedis.     Results:   Results from last 7 days   Lab Units 07/09/19  0341   WBC 10*3/mm3 19.08*   HEMOGLOBIN g/dL 8.4*   HEMATOCRIT % 29.4*   PLATELETS 10*3/mm3 226     Results from last 7 days   Lab Units 07/09/19  0341   SODIUM mmol/L 135*   POTASSIUM mmol/L 4.5   CHLORIDE mmol/L 104   CO2 mmol/L 19.0*   BUN mg/dL 36*   CREATININE mg/dL 1.49*   GLUCOSE mg/dL 142*   CALCIUM mg/dL 8.4           Imaging Results (last 24 hours)     ** No results found for the last 24 hours. **          Assessment      Acute ischemic leg, s/p thrombectomy (Dr. Dillon, 7/5/19)    Renal insufficiency, acute versus chronic    Chronic Tachycardia    Anemia    Arterial occlusion, lower extremity (CMS/HCC)    RLL Infiltrate (R/O Aspiration pneumonia)    Still awaiting telemetry bed.  Patient may ambulate with physical therapy and is satisfactory to transfer for to nursing/rehab facility anytime from my standpoint    Plan   She will need Plavix prescription at discharge    Please note that portions of this note were completed with a voice recognition program. Efforts were made to edit the dictations, but occasionally words are mistranscribed.    Candido Dillon MD  07/10/19  6:53 AM

## 2019-07-10 NOTE — PROGRESS NOTES
Continued Stay Note   [facility]     Patient Name: eBttye Sargent  MRN: [id number]  Today's Date: 7/10/2019    Admit Date: 7/5/2019    Discharge Plan     Row Name 07/10/19 1124       Plan    Plan  Rehab update    Patient/Family in Agreement with Plan  yes    Plan Comments  Patient transferred out of ICU today to tele bed. I spoke with dtr Dimple in the ICU and the plan is still rehab then transition to long term care bed. I called Susan with the [facility] [place] [facility] at [facility] and made her aware patient transferred to the floor and patient ready for discharge. She will check on bed availability. Natalia @ [phone]        Discharge Codes    No documentation.             Monica Feliciano RN

## 2019-07-10 NOTE — PLAN OF CARE
Problem: Patient Care Overview  Goal: Plan of Care Review  Outcome: Ongoing (interventions implemented as appropriate)   07/10/19 0892   Coping/Psychosocial   Plan of Care Reviewed With patient   Plan of Care Review   Progress improving   OTHER   Outcome Summary Pt increased ambulation distance to 30ft with RW and modA x2. Pt demonstrated slow opal with very forward flexed posture and short, small steps. No improvement despite increased cueing. Pt required max encouragement to participate; declined additional ambulation. Continue to progress as appropriate.

## 2019-07-10 NOTE — PROGRESS NOTES
Intensive Care Follow-up      LOS: 5 days     Ms. Bettye Sargent, 89 y.o. female is followed for: Glycemic, Electrolyte, Respiratory, and Medical management     Subjective - Interval History     Patient is a 89 y.o. female who presented to our ED on the 5th and after initially refusing treatment for a Doppler which showed clot from R femoral artery to the level of the ankle.  Patient was taken operating room by Dr. Dillon who formed angioplasty and stent placement.  She had some bradycardia during surgery and postoperative nausea and vomiting.  Elevated WBC and right lower lobe infiltrate consistent with a right lower lobe pneumonia, probably aspiration.    No problems overnight  Oxygenating well on 2 L/min via nasal cannula  Eating fair  Pain better on what is probably her usual home dose of Neurontin 3 times a day    The patient's relevant past medical, surgical and social history were reviewed and updated in Epic as appropriate.     Objective     Infusions:     Medications:    amLODIPine 5 mg Oral Q24H   aspirin 325 mg Oral Daily   atenolol 25 mg Oral Q24H   clopidogrel 75 mg Oral Daily   gabapentin 300 mg Oral Q8H   insulin lispro 0-7 Units Subcutaneous 4x Daily With Meals & Nightly   latanoprost 1 drop Both Eyes Nightly   piperacillin-tazobactam 3.375 g Intravenous Q12H     Intake/Output       07/09/19 0700 - 07/10/19 0659    Intake (ml) 1598    Output (ml) 400    Net (ml) 1198        Vital Sign Min/Max for last 24 hours  Temp  Min: 98 °F (36.7 °C)  Max: 98.7 °F (37.1 °C)   BP  Min: 141/48  Max: 178/62   Pulse  Min: 59  Max: 81   Resp  Min: 16  Max: 24   SpO2  Min: 92 %  Max: 99 %   No Data Recorded        Physical Exam:   GENERAL: Awake, no distress   HEENT: No adenopathy or thyromegaly   LUNGS: No wheezes or rhonchi   HEART: Regular rate and rhythm without murmurs   GI: Soft, nontender   EXTREMITIES: Warm.  Trace lower extremity edema.  No cyanosis   NEURO/PSYCH: Awake, alert, disoriented but  unchanged    Results from last 7 days   Lab Units 07/09/19  0341 07/08/19  0326 07/07/19  0423   WBC 10*3/mm3 19.08* 22.38* 16.30*   HEMOGLOBIN g/dL 8.4* 8.8* 8.6*   PLATELETS 10*3/mm3 226 255 274     Results from last 7 days   Lab Units 07/09/19  0341 07/08/19  0326 07/07/19  0423 07/06/19  1840 07/06/19  0537   SODIUM mmol/L 135* 134* 134* 134* 141   POTASSIUM mmol/L 4.5 4.3 4.7 5.1 4.7   CO2 mmol/L 19.0* 19.0* 20.0* 16.0* 23.0   BUN mg/dL 36* 33* 38* 39* 38*   CREATININE mg/dL 1.49* 1.82* 1.91* 1.85* 1.67*   MAGNESIUM mg/dL 2.0 2.0  --   --  2.3   PHOSPHORUS mg/dL 2.8  --  3.8 4.1 5.2*   GLUCOSE mg/dL 142* 194* 182* 147* 125*     Estimated Creatinine Clearance: 21.9 mL/min (A) (by C-G formula based on SCr of 1.49 mg/dL (H)).    Results from last 7 days   Lab Units 07/06/19  0537   HEMOGLOBIN A1C % 5.50           Lab Results   Component Value Date    LACTATE 1.6 07/08/2019          Images: Previous chest x-ray reveals right basilar infiltrate    I reviewed the patient's results and images.     Impression      Active Hospital Problems    Diagnosis   • RLL Infiltrate (R/O Aspiration pneumonia)   • Acute ischemic leg, s/p thrombectomy (Dr. Dillon, 7/5/19)   • Renal insufficiency, acute versus chronic   • Chronic Tachycardia   • Anemia   • Arterial occlusion, lower extremity (CMS/HCC)         Plan        Complete a course of antibiotics for a presumed aspiration pneumonia  May need better blood sugar control  Awaiting placement  On transfer to the floor   Plan of care and goals reviewed with Multidisciplinary Team and Antibiotic Stewardship rounds   I discussed the patient's findings and my recommendations with patient and nursing staff      .     BLACK Monique MD  Pulmonary and Critical Care Medicine

## 2019-07-10 NOTE — PLAN OF CARE
Problem: Patient Care Overview  Goal: Plan of Care Review  Outcome: Ongoing (interventions implemented as appropriate)   07/10/19 0637   Coping/Psychosocial   Plan of Care Reviewed With patient   Plan of Care Review   Progress improving   OTHER   Outcome Summary VSS, Pt A&O, very fatigued from working with PT today, moderate pain reported, relieved with repositioning and gabapentin, adequate UOP, belly pain reported, relieved with BM x 2, waiting for telemetry bed, and discussing placement in rehab facility.

## 2019-07-10 NOTE — PROGRESS NOTES
[facility]       LOS: 5 days   Patient Care Team:  Anand Akins MD as PCP - General (Family Medicine)        Subjective       Patient Denies:  Cp, sob, palpitations.  Up in chair      Vital Signs  Temp:  [98 °F (36.7 °C)-98.7 °F (37.1 °C)] 98.5 °F (36.9 °C)  Heart Rate:  [59-95] 82  Resp:  [18-24] 20  BP: (141-199)/(43-85) 162/75    Intake/Output Summary (Last 24 hours) at 7/10/2019 1052  Last data filed at 7/10/2019 0600  Gross per 24 hour   Intake 1598 ml   Output 400 ml   Net 1198 ml     No intake/output data recorded.    Physical Exam:     General Appearance:    Alert, cooperative, in no acute distress       Neck:   No adenopathy, supple, trachea midline, no thyromegaly, no JVD       Lungs:     Clear to auscultation,respirations regular, even and                  unlabored    Heart:    Regular rhythm and normal rate, normal S1 and S2, no            murmur, no gallop, no rub, no click   Chest Wall:    No abnormalities observed   Abdomen:     Normal bowel sounds, no masses, no organomegaly, soft        non-tender, non-distended       Extremities:   Moves all extremities well, no edema, no cyanosis, no             redness   Pulses:   Pulses palpable and equal bilaterally     Results Review:     I reviewed the patient's new clinical results.      WBC WBC   Date/Time Value Ref Range Status   07/09/2019 0341 19.08 (H) 3.40 - 10.80 10*3/mm3 Final   07/08/2019 0326 22.38 (H) 3.40 - 10.80 10*3/mm3 Final            HGB Hemoglobin   Date/Time Value Ref Range Status   07/09/2019 0341 8.4 (L) 12.0 - 15.9 g/dL Final   07/08/2019 0326 8.8 (L) 12.0 - 15.9 g/dL Final           HCT Hematocrit   Date/Time Value Ref Range Status   07/09/2019 0341 29.4 (L) 34.0 - 46.6 % Final   07/08/2019 0326 30.5 (L) 34.0 - 46.6 % Final            Platlets Platelets   Date/Time Value Ref Range Status   07/09/2019 0341 226 140 - 450 10*3/mm3 Final   07/08/2019 0326 255 262 - 474  10*3/mm3 Final     Sodium  Sodium   Date/Time Value Ref Range Status   07/09/2019 0341 135 (L) 136 - 145 mmol/L Final   07/08/2019 0326 134 (L) 136 - 145 mmol/L Final     Potassium  Potassium   Date/Time Value Ref Range Status   07/09/2019 0341 4.5 3.5 - 5.2 mmol/L Final   07/08/2019 0326 4.3 3.5 - 5.2 mmol/L Final     Chloride  Chloride   Date/Time Value Ref Range Status   07/09/2019 0341 104 98 - 107 mmol/L Final   07/08/2019 0326 101 98 - 107 mmol/L Final     BicarbonateNo results found for: PLASMABICARB    BUN BUN   Date/Time Value Ref Range Status   07/09/2019 0341 36 (H) 8 - 23 mg/dL Final   07/08/2019 0326 33 (H) 8 - 23 mg/dL Final      Creatinine Creatinine   Date/Time Value Ref Range Status   07/09/2019 0341 1.49 (H) 0.57 - 1.00 mg/dL Final   07/08/2019 0326 1.82 (H) 0.57 - 1.00 mg/dL Final      Calcium Calcium   Date/Time Value Ref Range Status   07/09/2019 0341 8.4 8.2 - 9.6 mg/dL Final   07/08/2019 0326 8.7 8.2 - 9.6 mg/dL Final      Mag @RESULFAST(MG:3)@        PT/INR:       Lab Results   Component Value Date    PROTIME 17.3 (H) 07/05/2019    INR 1.48 (H) 07/05/2019      Troponin I:  No results found for: TROPONINI   Lab Results   Component Value Date    CKTOTAL 68 07/06/2019    TROPONINT <0.010 07/08/2019         amLODIPine 5 mg Oral Q24H   aspirin 325 mg Oral Daily   atenolol 25 mg Oral Q24H   clopidogrel 75 mg Oral Daily   gabapentin 300 mg Oral Q8H   insulin lispro 0-7 Units Subcutaneous 4x Daily With Meals & Nightly   latanoprost 1 drop Both Eyes Nightly   piperacillin-tazobactam 3.375 g Intravenous Q12H          Assessment/Plan     Patient Active Problem List   Diagnosis Code   • Acute ischemic leg, s/p thrombectomy (Dr. 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

## 2019-07-10 NOTE — PLAN OF CARE
Problem: Patient Care Overview  Goal: Plan of Care Review  Outcome: Ongoing (interventions implemented as appropriate)   07/10/19 5565   Coping/Psychosocial   Plan of Care Reviewed With patient;daughter   Plan of Care Review   Progress improving   OTHER   Outcome Summary Pt moved from ICU today, doing well until about 1400 when she c/o sharp pain in her R leg with numbness in toes, provider notified, duplex ordered stat, otherwise continue to monitor and going to the [facility] when ready       Problem: Skin Injury Risk (Adult)  Goal: Identify Related Risk Factors and Signs and Symptoms  Outcome: Outcome(s) achieved Date Met: 07/10/19    Goal: Skin Health and Integrity  Outcome: Ongoing (interventions implemented as appropriate)      Problem: Fall Risk (Adult)  Goal: Identify Related Risk Factors and Signs and Symptoms  Outcome: Outcome(s) achieved Date Met: 07/10/19      Problem: VTE, DVT and PE (Adult)  Goal: Signs and Symptoms of Listed Potential Problems Will be Absent, Minimized or Managed (VTE, DVT and PE)  Outcome: Ongoing (interventions implemented as appropriate)

## 2019-07-11 NOTE — PLAN OF CARE
Problem: Patient Care Overview  Goal: Plan of Care Review  Outcome: Ongoing (interventions implemented as appropriate)   07/11/19 0526   Coping/Psychosocial   Plan of Care Reviewed With patient   Plan of Care Review   Progress improving   OTHER   Outcome Summary Pt rested intermittently throughout night, hypertensive, otherwise vss, no c/o n/v/d, R leg pain, no numbness or tingling, disoriented to time, but reorients very well, awaiting bed at the [place], will continue to monitor     Goal: Individualization and Mutuality  Outcome: Ongoing (interventions implemented as appropriate)    Goal: Discharge Needs Assessment  Outcome: Ongoing (interventions implemented as appropriate)      Problem: Skin Injury Risk (Adult)  Goal: Skin Health and Integrity  Outcome: Ongoing (interventions implemented as appropriate)      Problem: Fall Risk (Adult)  Goal: Absence of Fall  Outcome: Ongoing (interventions implemented as appropriate)      Problem: VTE, DVT and PE (Adult)  Goal: Signs and Symptoms of Listed Potential Problems Will be Absent, Minimized or Managed (VTE, DVT and PE)  Outcome: Ongoing (interventions implemented as appropriate)

## 2019-07-11 NOTE — PROGRESS NOTES
[facility]       LOS: 6 days   Patient Care Team:  Anand Akins MD as PCP - General (Family Medicine)        Subjective       Patient Denies:  Cp, sob, palpitations.  Hurt all over      Vital Signs  Temp:  [97.7 °F (36.5 °C)-98.7 °F (37.1 °C)] 98.2 °F (36.8 °C)  Heart Rate:  [62-85] 84  Resp:  [18-22] 18  BP: (109-183)/(70-79) 166/79    Intake/Output Summary (Last 24 hours) at 7/11/2019 1031  Last data filed at 7/10/2019 1802  Gross per 24 hour   Intake 650 ml   Output --   Net 650 ml     No intake/output data recorded.    Physical Exam:     General Appearance:    Alert, cooperative, in no acute distress       Neck:   No adenopathy, supple, trachea midline, no thyromegaly, no JVD       Lungs:     Clear to auscultation,respirations regular, even and                  unlabored    Heart:    Regular rhythm and normal rate, normal S1 and S2, no            murmur, no gallop, no rub, no click   Chest Wall:    No abnormalities observed   Abdomen:     Normal bowel sounds, no masses, no organomegaly, soft        non-tender, non-distended       Extremities:   Moves all extremities well, no edema, no cyanosis, no             redness   Pulses:   Pulses palpable and equal bilaterally     Results Review:     I reviewed the patient's new clinical results.      WBC WBC   Date/Time Value Ref Range Status   07/09/2019 0341 19.08 (H) 3.40 - 10.80 10*3/mm3 Final            HGB Hemoglobin   Date/Time Value Ref Range Status   07/09/2019 0341 8.4 (L) 12.0 - 15.9 g/dL Final           HCT Hematocrit   Date/Time Value Ref Range Status   07/09/2019 0341 29.4 (L) 34.0 - 46.6 % Final            Platlets Platelets   Date/Time Value Ref Range Status   07/09/2019 0341 226 140 - 450 10*3/mm3 Final     Sodium  Sodium   Date/Time Value Ref Range Status   07/09/2019 0341 135 (L) 136 - 145 mmol/L Final     Potassium  Potassium   Date/Time Value Ref Range Status   07/09/2019 0341 4.5  3.5 - 5.2 mmol/L Final     Chloride  Chloride   Date/Time Value Ref Range Status   07/09/2019 0341 104 98 - 107 mmol/L Final     BicarbonateNo results found for: PLASMABICARB    BUN BUN   Date/Time Value Ref Range Status   07/09/2019 0341 36 (H) 8 - 23 mg/dL Final      Creatinine Creatinine   Date/Time Value Ref Range Status   07/09/2019 0341 1.49 (H) 0.57 - 1.00 mg/dL Final      Calcium Calcium   Date/Time Value Ref Range Status   07/09/2019 0341 8.4 8.2 - 9.6 mg/dL Final      Mag @RESULFAST(MG:3)@        PT/INR:       Lab Results   Component Value Date    PROTIME 17.3 (H) 07/05/2019    INR 1.48 (H) 07/05/2019      Troponin I:  No results found for: TROPONINI   Lab Results   Component Value Date    CKTOTAL 68 07/06/2019    TROPONINT <0.010 07/08/2019         amLODIPine 5 mg Oral Q24H   aspirin 325 mg Oral Daily   atenolol 25 mg Oral Q24H   clopidogrel 75 mg Oral Daily   gabapentin 300 mg Oral Q8H   insulin lispro 0-7 Units Subcutaneous 4x Daily With Meals & Nightly   latanoprost 1 drop Both Eyes Nightly   piperacillin-tazobactam 3.375 g Intravenous Q12H   saccharomyces boulardii 250 mg Oral BID          Assessment/Plan     Patient Active Problem List   Diagnosis Code   • Acute ischemic leg, s/p thrombectomy (Dr. Dillon, 7/5/19) I99.8   • Renal insufficiency, acute versus chronic N28.9   • Chronic Tachycardia R00.0   • Anemia D64.9   • Arterial occlusion, lower extremity (CMS/[facility]) I70.209   • RLL Infiltrate (R/O Aspiration pneumonia) R91.8   Normal EF    CV stable/no change  Continue beta blocker  Increase bp meds  Await placement  We will give her a brief trial of low-dose steroids for pain in the right thigh presumably superficial thrombophlebitis.  I would avoid nonsteroidal anti-inflammatory drugs due to her age and renal function.      RAYO Esteban  07/11/19  10:31 AM

## 2019-07-11 NOTE — PROGRESS NOTES
Continued Stay Note  [facility]     Patient Name: Bettye Sargent  MRN: [id number]  Today's Date: 7/11/2019    Admit Date: 7/5/2019    Discharge Plan     Row Name 07/11/19 1325       Plan    Plan  update    Patient/Family in Agreement with Plan  yes    Plan Comments  CM spoke w Dimple from the [facility] They have a bed available at [place] and are able to take pt as soon as tomorrow if ready.   CM will let family know and has left a note for Attending re: d/c plan. CM will continue to follow.    Final Discharge Disposition Code  03 - skilled nursing facility (SNF)        Discharge Codes    No documentation.             Bette Melendez, RN

## 2019-07-11 NOTE — PROGRESS NOTES
[facility]  PROGRESS NOTE    Patient Name: Bettye Sargent  : 3/28/1927  MRN: [id number]    Date of Admission: 2019  Length of Stay: 6  Primary Care Physician: Anand Akins MD    Subjective   Subjective     CC: Medical management    HPI: Complains of pain in her right thigh.  Doesn't think Tylenol does anything.    Review of Systems  Gen- No fevers, chills  CV- No chest pain, palpitations  Resp- No cough, dyspnea  GI- + loose stools    Otherwise ROS is negative except as mentioned in the HPI.    Objective   Objective     Vital Signs:   Temp:  [97.7 °F (36.5 °C)-98.7 °F (37.1 °C)] 98.2 °F (36.8 °C)  Heart Rate:  [62-85] 84  Resp:  [18-22] 18  BP: (123-183)/(70-79) 166/79        Physical Exam:  Constitutional: No acute distress, awake, alert, laying in bed  HENT: NCAT, mucous membranes moist  Respiratory: Clear to auscultation bilaterally, respiratory effort normal   Cardiovascular: RRR, no murmurs, rubs, or gallops  Gastrointestinal: Positive bowel sounds, soft, nontender, nondistended  Musculoskeletal: No bilateral ankle edema  Psychiatric: Appropriate affect, cooperative  Neurologic: Cranial Nerves grossly intact to confrontation, speech clear  Skin: No rashes    Results Reviewed:  I have personally reviewed current lab, radiology, and data and agree.    Results from last 7 days   Lab Units 19  03419  0326 19   WBC 10*3/mm3 19.08* 22.38* 16.30*   < >  --    HEMOGLOBIN g/dL 8.4* 8.8* 8.6*   < >  --    HEMATOCRIT % 29.4* 30.5* 30.2*   < >  --    PLATELETS 10*3/mm3 226 255 274   < >  --    INR   --   --   --   --  1.48*    < > = values in this interval not displayed.     Results from last 7 days   Lab Units 19  03419  1018 19  0326 19  0423  19  1304   SODIUM mmol/L 135*  --  134* 134*   < > 141   POTASSIUM mmol/L 4.5  --  4.3 4.7   < > 4.7   CHLORIDE mmol/L 104  --  101 100   < > 105    CO2 mmol/L 19.0*  --  19.0* 20.0*   < > 23.0   BUN mg/dL 36*  --  33* 38*   < > 38*   CREATININE mg/dL 1.49*  --  1.82* 1.91*   < > 1.69*   GLUCOSE mg/dL 142*  --  194* 182*   < > 113*   CALCIUM mg/dL 8.4  --  8.7 8.6   < > 9.6   ALT (SGPT) U/L 25  --  24  --   --  7   AST (SGOT) U/L 39*  --  47*  --   --  10   TROPONIN T ng/mL  --  <0.010  --   --   --   --     < > = values in this interval not displayed.     Estimated Creatinine Clearance: 21.9 mL/min (A) (by C-G formula based on SCr of 1.49 mg/dL (H)).    Microbiology Results Abnormal     None          Imaging Results (last 24 hours)     ** No results found for the last 24 hours. **          Results for orders placed during the hospital encounter of 07/05/19   Adult Transthoracic Echo Complete W/ Cont if Necessary Per Protocol    Narrative · Mild tricuspid valve regurgitation is present.  · Mild mitral valve regurgitation is present  · Aortic valve maximum pressure gradient is 14.3 mmHg.  · Aortic valve mean pressure gradient is 6.8 mmHg.  · Aortic valve sclerosis/mild stenosis  · Normal LV systolic function  · EF=60%          I have reviewed the medications:  Scheduled Meds:  amLODIPine 5 mg Oral BID   aspirin 325 mg Oral Daily   atenolol 25 mg Oral Q24H   clopidogrel 75 mg Oral Daily   gabapentin 300 mg Oral Q8H   insulin lispro 0-7 Units Subcutaneous 4x Daily With Meals & Nightly   latanoprost 1 drop Both Eyes Nightly   piperacillin-tazobactam 3.375 g Intravenous Q12H   [START ON 7/12/2019] predniSONE 10 mg Oral Once   predniSONE 20 mg Oral Once   [START ON 7/13/2019] predniSONE 5 mg Oral Once   saccharomyces boulardii 250 mg Oral BID     Continuous Infusions:   PRN Meds:.•  acetaminophen  •  sennosides-docusate sodium  •  traMADol      Assessment/Plan   Assessment / Plan     Active Hospital Problems    Diagnosis  POA   • RLL Infiltrate (R/O Aspiration pneumonia) [R91.8]  No   • Acute ischemic leg, s/p thrombectomy (Dr. Dillon, 7/5/19) [I99.8]  Yes   •  Renal insufficiency, acute versus chronic [N28.9]  Yes   • Chronic Tachycardia [R00.0]  Yes   • Anemia [D64.9]  Yes   • Arterial occlusion, lower extremity (CMS/HCC) [I70.209]  Yes      Resolved Hospital Problems   No resolved problems to display.     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

## 2019-07-11 NOTE — PROGRESS NOTES
CTS Progress Note       LOS: 6 days   Patient Care Team:  Anand Akins MD as PCP - General (Family Medicine)    Chief Complaint: Vascular disease    Vital Signs:  Temp:  [97.7 °F (36.5 °C)-98.7 °F (37.1 °C)] 98.7 °F (37.1 °C)  Heart Rate:  [62-95] 85  Resp:  [19-22] 22  BP: (109-199)/(62-85) 171/70    Physical Exam: Leg remains revascularized       Results:   Results from last 7 days   Lab Units 07/09/19  0341   WBC 10*3/mm3 19.08*   HEMOGLOBIN g/dL 8.4*   HEMATOCRIT % 29.4*   PLATELETS 10*3/mm3 226     Results from last 7 days   Lab Units 07/09/19  0341   SODIUM mmol/L 135*   POTASSIUM mmol/L 4.5   CHLORIDE mmol/L 104   CO2 mmol/L 19.0*   BUN mg/dL 36*   CREATININE mg/dL 1.49*   GLUCOSE mg/dL 142*   CALCIUM mg/dL 8.4           Imaging Results (last 24 hours)     ** No results found for the last 24 hours. **          Assessment      Acute ischemic leg, s/p thrombectomy (Dr. Dillon, 7/5/19)    Renal insufficiency, acute versus chronic    Chronic Tachycardia    Anemia    Arterial occlusion, lower extremity (CMS/HCC)    RLL Infiltrate (R/O Aspiration pneumonia)        Plan   Transfer to nursing facility anytime from my standpoint with a Plavix prescription.  Remove groin dressing today    Please note that portions of this note were completed with a voice recognition program. Efforts were made to edit the dictations, but occasionally words are mistranscribed.    Candido Dillon MD  07/11/19  7:29 AM

## 2019-07-12 PROBLEM — J69.0 ASPIRATION PNEUMONIA (HCC): Status: ACTIVE | Noted: 2019-01-01

## 2019-07-12 NOTE — DISCHARGE SUMMARY
CTS Discharge Summary    Patient Care Team:  Anand Akins MD as PCP - General (Family Medicine)    Date of Admission: 7/5/2019  7:52 PM  Date of Discharge:  7/12/2019    Discharge Diagnosis  Past Medical History:   Diagnosis Date   • Neuropathy    • Shingles      Patient Active Problem List   Diagnosis   • Acute ischemic leg, s/p thrombectomy (Dr. Dillon, 7/5/19)   • Renal insufficiency, acute versus chronic   • Chronic Tachycardia   • Anemia   • Arterial occlusion, lower extremity (CMS/HCC)   • Aspiration pneumonia (CMS/HCC)       Arterial occlusion, lower extremity (CMS/HCC) [I70.209]     Procedures Performed  Procedure(s):  Right Femoral artery cutdown with Angioplasty and stent of the proximal right common iliac artery       History of Present Illness  Patient is a 89 y.o. female who has a 5 or 6-day history of lower back pain and right hip pain which she attributed to a bad back.  However for the last 3-1/2 days she is complaining of pain in the right calf and foot and for 2 days she has had coolness and states that her right foot and lower leg have been numb.  She presented to the emergency department today with these complaints and initially refused an arterial Doppler pain medication and to be discharged home.  Ultimately Doppler demonstrated clot from the right femoral artery to the level of the ankle and essentially no Doppler signals in the posterior tibial or dorsalis pedis vessel.  Patient has a 20-year history of smoking but states she has not smoked in over 20 years.  I was called to see her and recommended that we proceed urgently to the operating room.  Lactic acid level was elevated and patient's serum creatinine is 1.6.  Discussed the situation with the patient family.  She is able to move her toes.    Hospital Course  Patient was taken to the operating room on 7/5/19 for right femoral artery cutdown, angioplasty and stent of the proximal right common iliac artery with a covered VBX stent  followed by proximal right external iliac stentx2.  Patient experienced 2 episodes of intraoperative bradycardia. Patient was extubated in the operating room and transported to recovery in stable condition.  POD 1:  Right foot cool.  Transfused 1 unit PRBC.  Plavix 150 mg once and 75 mg daily.  POD 2:  Right foot warmer.  Doppler signal in right DP and PT.  Creatinine increased.    POD 3:  WBC 22k.  Narcotics discontinued.  Cardiology consulted for  Paroxysmal atrial tachycardia.  Vomiting and abdominal pain.  NPO.  CXR suggests pna.  Empiric abx started for presumed aspiration pna.  POD 4:  Awaiting telemetry bed.  Nausea improved.    POD 5: Ambulating 30 feet with PT.   Transferred to telemetry  POD 6:  Doppler signals in BLE. Prednisone started for thrombophlebitis.  POD 7:  Patient met discharge criteria and was transferred to [facility]    Discharge Medications     Discharge Medications      New Medications      Instructions Start Date   acetaminophen 325 MG tablet  Commonly known as:  TYLENOL   650 mg, Oral, Every 4 Hours PRN      amLODIPine 5 MG tablet  Commonly known as:  NORVASC   5 mg, Oral, 2 Times Daily      amoxicillin-clavulanate 500-125 MG per tablet  Commonly known as:  AUGMENTIN   1 tablet, Oral, Every 12 Hours Scheduled      clopidogrel 75 MG tablet  Commonly known as:  PLAVIX   75 mg, Oral, Daily         Continue These Medications      Instructions Start Date   aspirin 325 MG tablet   325 mg, Oral, Daily      atenolol 50 MG tablet  Commonly known as:  TENORMIN   50 mg, Oral, Daily      fluticasone 50 MCG/ACT nasal spray  Commonly known as:  FLONASE   2 sprays, Each Nare, Daily PRN      gabapentin 300 MG capsule  Commonly known as:  NEURONTIN   300 mg, Oral, Daily PRN      LUMIGAN 0.01 % ophthalmic drops  Generic drug:  bimatoprost   1 drop, Both Eyes, Nightly         Stop These Medications    hydrochlorothiazide 25 MG tablet  Commonly known as:  HYDRODIURIL     HYDROcodone-acetaminophen 5-325 MG  per tablet  Commonly known as:  NORCO            Discharge Diet:   Diet Instructions     Diet: Cardiac      Discharge Diet:  Cardiac          Activity at Discharge:   Activity Instructions     Driving Restrictions      Type of Restriction:  Driving    Driving Restrictions:  No Driving Until Next Appointment    Lifting Restrictions      Type of Restriction:  Lifting    Lifting Restrictions:  Lifting Restriction (Indicate Limit)    Weight Limit (Pounds):  15    Length of Lifting Restriction:  until next appointment          Follow-up Appointments    Follow up with Dr Dillon in office in 2-3 weeks.      RAYO Jo  07/12/19  3:33 PM

## 2019-07-12 NOTE — PROGRESS NOTES
[facility]       LOS: 7 days   Patient Care Team:  Anand Akins MD as PCP - General (Family Medicine)        Subjective       Patient Denies:  Cp, sob, palpitations.  Feels better      Vital Signs  Temp:  [97.6 °F (36.4 °C)-98.4 °F (36.9 °C)] 98 °F (36.7 °C)  Heart Rate:  [68-82] 68  Resp:  [18] 18  BP: (169-185)/(69-88) 178/69    Intake/Output Summary (Last 24 hours) at 7/12/2019 0800  Last data filed at 7/11/2019 2210  Gross per 24 hour   Intake 150 ml   Output 750 ml   Net -600 ml     No intake/output data recorded.    Physical Exam:     General Appearance:    Alert, cooperative, in no acute distress       Neck:   No adenopathy, supple, trachea midline, no thyromegaly, no JVD       Lungs:     Clear to auscultation,respirations regular, even and                  unlabored    Heart:    Regular rhythm and normal rate, normal S1 and S2, no            murmur, no gallop, no rub, no click   Chest Wall:    No abnormalities observed   Abdomen:     Normal bowel sounds, no masses, no organomegaly, soft        non-tender, non-distended       Extremities:   Moves all extremities well, no edema, no cyanosis, no             redness   Pulses:   Pulses palpable and equal bilaterally     Results Review:     I reviewed the patient's new clinical results.      WBC WBC   Date/Time Value Ref Range Status   07/12/2019 0422 9.30 3.40 - 10.80 10*3/mm3 Final            HGB Hemoglobin   Date/Time Value Ref Range Status   07/12/2019 0422 8.5 (L) 12.0 - 15.9 g/dL Final           HCT Hematocrit   Date/Time Value Ref Range Status   07/12/2019 0422 30.1 (L) 34.0 - 46.6 % Final            Platlets Platelets   Date/Time Value Ref Range Status   07/12/2019 0422 285 140 - 450 10*3/mm3 Final     Sodium  Sodium   Date/Time Value Ref Range Status   07/12/2019 0422 137 136 - 145 mmol/L Final     Potassium  Potassium   Date/Time Value Ref Range Status   07/12/2019 0422 4.7 3.5 - 5.2  mmol/L Final     Chloride  Chloride   Date/Time Value Ref Range Status   07/12/2019 0422 103 98 - 107 mmol/L Final     BicarbonateNo results found for: PLASMABICARB    BUN BUN   Date/Time Value Ref Range Status   07/12/2019 0422 32 (H) 8 - 23 mg/dL Final      Creatinine Creatinine   Date/Time Value Ref Range Status   07/12/2019 0422 1.27 (H) 0.57 - 1.00 mg/dL Final      Calcium Calcium   Date/Time Value Ref Range Status   07/12/2019 0422 9.2 8.2 - 9.6 mg/dL Final      Mag @RESULFAST(MG:3)@        PT/INR:       Lab Results   Component Value Date    PROTIME 17.3 (H) 07/05/2019    INR 1.48 (H) 07/05/2019      Troponin I:  No results found for: TROPONINI   Lab Results   Component Value Date    CKTOTAL 68 07/06/2019    TROPONINT <0.010 07/08/2019         amLODIPine 5 mg Oral BID   aspirin 325 mg Oral Daily   atenolol 25 mg Oral Q24H   clopidogrel 75 mg Oral Daily   gabapentin 300 mg Oral Q8H   insulin lispro 0-7 Units Subcutaneous 4x Daily With Meals & Nightly   latanoprost 1 drop Both Eyes Nightly   piperacillin-tazobactam 3.375 g Intravenous Q12H   predniSONE 10 mg Oral Once   [START ON 7/13/2019] predniSONE 5 mg Oral Once   saccharomyces boulardii 250 mg Oral BID          Assessment/Plan     Patient Active Problem List   Diagnosis Code   • Acute ischemic leg, s/p thrombectomy (Dr. 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  I would avoid nonsteroidal anti-inflammatory drugs due to her age and renal function.  Feels much better today.  Significant relief from short course steroids.  Ok to rehab    Jaret Yeager,  PA  07/12/19  8:00 AM

## 2019-07-12 NOTE — PLAN OF CARE
"Problem: Patient Care Overview  Goal: Plan of Care Review  Outcome: Ongoing (interventions implemented as appropriate)   07/12/19 0412   Coping/Psychosocial   Plan of Care Reviewed With patient   Plan of Care Review   Progress improving   OTHER   Outcome Summary pt rested well thorughout night, no c/o pain, no c/o n/v/d, hypertensive, otherwise vss, states \"leg feels better\", shows more active range of motion in leg, [facility] will continue to monitor.     Goal: Individualization and Mutuality  Outcome: Ongoing (interventions implemented as appropriate)    Goal: Discharge Needs Assessment  Outcome: Ongoing (interventions implemented as appropriate)      Problem: Skin Injury Risk (Adult)  Goal: Skin Health and Integrity  Outcome: Ongoing (interventions implemented as appropriate)      Problem: Fall Risk (Adult)  Goal: Absence of Fall  Outcome: Ongoing (interventions implemented as appropriate)      Problem: VTE, DVT and PE (Adult)  Goal: Signs and Symptoms of Listed Potential Problems Will be Absent, Minimized or Managed (VTE, DVT and PE)  Outcome: Ongoing (interventions implemented as appropriate)        "

## 2019-07-12 NOTE — PROGRESS NOTES
[facility] Medicine Services  PROGRESS NOTE    Patient Name: Bettye Sargent  : 3/28/1927  MRN: [id number]    Date of Admission: 2019  Length of Stay: 7  Primary Care Physician: Anand Akins MD    Subjective   Subjective     CC: Medical management    HPI: Feeling much better today.  Pain is significantly improved.  Still with some loose stools.    Review of Systems  Gen- No fevers, chills  CV- No chest pain, palpitations  Resp- No cough, dyspnea  GI- + loose stools    Otherwise ROS is negative except as mentioned in the HPI.    Objective   Objective     Vital Signs:   Temp:  [97.6 °F (36.4 °C)-98.4 °F (36.9 °C)] 98 °F (36.7 °C)  Heart Rate:  [68-82] 78  Resp:  [18] 18  BP: (169-185)/(69-88) 179/80        Physical Exam:  Constitutional: No acute distress, awake, alert, sitting up in bed  HENT: NCAT, mucous membranes moist  Respiratory: Clear to auscultation bilaterally, respiratory effort normal   Cardiovascular: RRR, no murmurs, rubs, or gallops  Gastrointestinal: Positive bowel sounds, soft, nontender, nondistended  Musculoskeletal: No bilateral ankle edema  Psychiatric: Appropriate affect, cooperative  Neurologic: Cranial Nerves grossly intact to confrontation, speech clear  Skin: No rashes    Results Reviewed:  I have personally reviewed current lab, radiology, and data and agree.    Results from last 7 days   Lab Units 196  19   WBC 10*3/mm3 9.30 19.08* 22.38*   < >  --    HEMOGLOBIN g/dL 8.5* 8.4* 8.8*   < >  --    HEMATOCRIT % 30.1* 29.4* 30.5*   < >  --    PLATELETS 10*3/mm3 285 226 255   < >  --    INR   --   --   --   --  1.48*    < > = values in this interval not displayed.     Results from last 7 days   Lab Units 19  1018 19  0326  19  1304   SODIUM mmol/L 137 135*  --  134*   < > 141   POTASSIUM mmol/L 4.7 4.5  --  4.3   < > 4.7   CHLORIDE mmol/L 103 104  --  101    < > 105   CO2 mmol/L 21.0* 19.0*  --  19.0*   < > 23.0   BUN mg/dL 32* 36*  --  33*   < > 38*   CREATININE mg/dL 1.27* 1.49*  --  1.82*   < > 1.69*   GLUCOSE mg/dL 150* 142*  --  194*   < > 113*   CALCIUM mg/dL 9.2 8.4  --  8.7   < > 9.6   ALT (SGPT) U/L  --  25  --  24  --  7   AST (SGOT) U/L  --  39*  --  47*  --  10   TROPONIN T ng/mL  --   --  <0.010  --   --   --     < > = values in this interval not displayed.     Estimated Creatinine Clearance: 25.7 mL/min (A) (by C-G formula based on SCr of 1.27 mg/dL (H)).    Microbiology Results Abnormal     None          Imaging Results (last 24 hours)     ** No results found for the last 24 hours. **          Results for orders placed during the hospital encounter of 07/05/19   Adult Transthoracic Echo Complete W/ Cont if Necessary Per Protocol    Narrative · Mild tricuspid valve regurgitation is present.  · Mild mitral valve regurgitation is present  · Aortic valve maximum pressure gradient is 14.3 mmHg.  · Aortic valve mean pressure gradient is 6.8 mmHg.  · Aortic valve sclerosis/mild stenosis  · Normal LV systolic function  · EF=60%          I have reviewed the medications:  Scheduled Meds:    amLODIPine 5 mg Oral BID   amoxicillin-clavulanate 1 tablet Oral Q12H   aspirin 325 mg Oral Daily   atenolol 25 mg Oral Q24H   clopidogrel 75 mg Oral Daily   gabapentin 300 mg Oral Q8H   latanoprost 1 drop Both Eyes Nightly   predniSONE 10 mg Oral Once   [START ON 7/13/2019] predniSONE 5 mg Oral Once   saccharomyces boulardii 250 mg Oral BID     Continuous Infusions:   PRN Meds:.•  acetaminophen  •  sennosides-docusate sodium  •  traMADol      Assessment/Plan   Assessment / Plan     Active Hospital Problems    Diagnosis  POA   • **Acute ischemic leg, s/p thrombectomy (Dr. Dillon, 7/5/19) [I99.8]  Yes   • Aspiration pneumonia (CMS/HCC) [J69.0]  Yes   • Renal insufficiency, acute versus chronic [N28.9]  Yes   • Chronic Tachycardia [R00.0]  Yes   • Anemia [D64.9]  Yes   •  Arterial occlusion, lower extremity (CMS/[facility]) [I70.209]  Yes      Resolved Hospital Problems   No resolved problems to display.     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

## 2019-07-12 NOTE — PLAN OF CARE
Problem: Patient Care Overview  Goal: Plan of Care Review  Outcome: Ongoing (interventions implemented as appropriate)   07/12/19 0802   Coping/Psychosocial   Plan of Care Reviewed With patient   Plan of Care Review   Progress improving   OTHER   Outcome Summary Pt. partially met several goals and met grooming goal. Pt. with less pain today. Pt. unsafe practices with transfers and walker use, but only partially open to education. AE issued to improve pt.'s LBD and LBB. Possible discharge to SNF today.

## 2019-07-12 NOTE — PROGRESS NOTES
Case Management Discharge Note    Final Note: Pt is cleared to go to [facility] [place] today. RN can call report to [phone]. [facility] is able to get the d/c summary from [facility]-and they are aware of this. If there is issues w them obtaining this and they request a fax the d/c summary can be faxed to [phone]. CM spoke w pt Daughter who will be transporting pt to [facility].  She will need assistance w Transport to get pt in her vehicle.  She will also need assistance at [facility] to get pt out of her vehicle.  CM will leave instructions (per request) in pt d/c packet re: how for her to do this when she arrives. Pt is going to room 223. No other needs at this time.    Destination      No service has been selected for the patient.      Durable Medical Equipment      No service has been selected for the patient.      Dialysis/Infusion      No service has been selected for the patient.      Home Medical Care      No service has been selected for the patient.      Therapy      No service has been selected for the patient.      Community Resources      No service has been selected for the patient.             Final Discharge Disposition Code: 03 - skilled nursing facility (SNF)

## 2019-07-12 NOTE — THERAPY TREATMENT NOTE
Acute Care - Physical Therapy Treatment Note  [facility]     Patient Name: Bettye Sargnet  : 3/28/1927  MRN: [id number]  Today's Date: 2019  Onset of Illness/Injury or Date of Surgery: 19  Date of Referral to PT: 19  Referring Physician: Candido Dillon MD    Admit Date: 2019    Visit Dx:    ICD-10-CM ICD-9-CM   1. Arterial occlusion, lower extremity (CMS/HCC) I70.209 444.22   2. Impaired functional mobility, balance, gait, and endurance Z74.09 V49.89   3. Impaired mobility and ADLs Z74.09 799.89     Patient Active Problem List   Diagnosis   • Acute ischemic leg, s/p thrombectomy (Dr. Dillon, 19)   • Renal insufficiency, acute versus chronic   • Chronic Tachycardia   • Anemia   • Arterial occlusion, lower extremity (CMS/HCC)   • RLL Infiltrate (R/O Aspiration pneumonia)       Therapy Treatment    Rehabilitation Treatment Summary     Row Name 19 0840 19 0802          Treatment Time/Intention    Discipline  physical therapy assistant  -AS  occupational therapist  -BOB     Document Type  therapy note (daily note)  -AS  therapy note (daily note)  -BOB     Subjective Information  complains of;pain;weakness  -AS  complains of;pain discomfort from bed  -BOB     Mode of Treatment  physical therapy  -AS  individual therapy;occupational therapy  -BOB     Patient/Family Observations  no family at bedside, patient sitting [facility]IC and agreed to therapy.  -AS  No family present.  Pt. supine in bed on tele with IV.  -BOB     Care Plan Review  --  evaluation/treatment results reviewed;care plan/treatment goals reviewed;risks/benefits reviewed;current/potential barriers reviewed;patient/other agree to care plan  -BOB     Therapy Frequency (OT Eval)  --  daily  -BOB     Patient Effort  good  -AS  good  -BOB     Existing Precautions/Restrictions  fall  -AS  fall  -BOB     Treatment Considerations/Comments  R chronic knee pain  (Significant)   -AS  --     Equipment Issued to Patient  --  bathing  equipment;dressing equipment  -BOB     Recorded by [AS] Maria Antonia Dickens, PTA 07/12/19 0925 [BOB] JordenThelmaa, OT 07/12/19 0855     Row Name 07/12/19 0802             Vital Signs    Pre Systolic BP Rehab  186  -BOB      Pre Treatment Diastolic BP  70 nurse OK tx up to chair, nurse to bring morning meds  -BOB      Post Systolic BP Rehab  -- BP cuff hurts pt.'s UE not wanting another reading now  -BOB      Pretreatment Heart Rate (beats/min)  76  -BOB      Posttreatment Heart Rate (beats/min)  79  -BOB      Pre SpO2 (%)  90  -BBO      O2 Delivery Pre Treatment  room air  -BOB      Post SpO2 (%)  97  -BOB      O2 Delivery Post Treatment  room air  -BOB      Pre Patient Position  Supine  -BOB      Intra Patient Position  Standing  -BOB      Post Patient Position  Sitting  -BOB      Recorded by [BOB] JordenThelma Nely, OT 07/12/19 0855      Row Name 07/12/19 0840 07/12/19 0802          Cognitive Assessment/Intervention- PT/OT    Affect/Mental Status (Cognitive)  anxious  -AS  anxious  -BOB     Orientation Status (Cognition)  oriented to;person;verbal cues/prompts needed for orientation;place  -AS  oriented to;person  -BOB     Follows Commands (Cognition)  follows one step commands;over 90% accuracy;verbal cues/prompting required  -AS  follows one step commands;over 90% accuracy;repetition of directions required  -BOB     Cognitive Function (Cognitive)  --  safety deficit  -BOB     Safety Deficit (Cognitive)  mild deficit;awareness of need for assistance;insight into deficits/self awareness;safety precautions awareness;safety precautions follow-through/compliance  -AS  mild deficit;safety precautions awareness;safety precautions follow-through/compliance  -BOB     Personal Safety Interventions  fall prevention program maintained;gait belt;nonskid shoes/slippers when out of bed;other (see comments) exit alarm  -AS  fall prevention program maintained;gait belt;nonskid shoes/slippers when out of bed  -BOB     Recorded by [AS] Maria Antonia Dickens  Tatiana, PTA 07/12/19 0925 [BOB] Thelma Leonardo Nely, OT 07/12/19 0855     Row Name 07/12/19 0802             Safety Issues, Functional Mobility    Safety Issues Affecting Function (Mobility)  safety precaution awareness;safety precautions follow-through/compliance  -BOB      Impairments Affecting Function (Mobility)  balance;endurance/activity tolerance;strength;postural/trunk control;pain  -BOB      Recorded by [BOB] Thelma Leonardo, OT 07/12/19 0855      Row Name 07/12/19 0840 07/12/19 0802          Bed Mobility Assessment/Treatment    Bed Mobility Assessment/Treatment  --  supine-sit;scooting/bridging  -BOB     Scooting/Bridging [place] (Bed Mobility)  --  maximum assist (25% patient effort);nonverbal cues (demo/gesture);verbal cues  -BOB     Supine-Sit [place] (Bed Mobility)  --  moderate assist (50% patient effort);nonverbal cues (demo/gesture);verbal cues  -BOB     Bed Mobility, Safety Issues  --  decreased use of arms for pushing/pulling;decreased use of legs for bridging/pushing;impaired trunk control for bed mobility  -BOB     Assistive Device (Bed Mobility)  --  draw sheet;head of bed elevated;bed rails  -BOB     Comment (Bed Mobility)  not tested, patient sitting UI pre/post tx  -AS  cues to sequence task, pt. worried about hurting RLE and not assisting as much as appears able to  -BOB     Recorded by [AS] Maria Antonia Dickens, PTA 07/12/19 0925 [BOB] Thelma Leonardo Nely, OT 07/12/19 0855     Row Name 07/12/19 0802             Functional Mobility    Functional Mobility- Ind. Level  minimum assist (75% patient effort);2 person assist required  -BOB      Functional Mobility- Device  rolling walker  -BOB      Functional Mobility-Distance (Feet)  6 steps to BSC then to recliner  -BOB      Functional Mobility- Safety Issues  step length decreased;weight-shifting ability decreased very flexed posture.  -BOB      Functional Mobility- Comment  Trying to cue pt. to stand more upright, but pt. stating she is more likely to fall  and declined.  Pt. wanting to rest forearms on wx hand .  Pt. needing assist to steer walker.  -BOB      Recorded by [BOB] Thelma Leonardo, OT 07/12/19 0855      Row Name 07/12/19 0802             Transfer Assessment/Treatment    Transfer Assessment/Treatment  sit-stand transfer;stand-sit transfer;toilet transfer  -BOB      Comment (Transfers)  verbal cues for hand placement.  Pt. declined to push off bed or reach back for BSC, but with further encouragement pushed off BSC and reached back for chair arm rest.  -BOB      Recorded by [BOB] Thelma Leonardo, OT 07/12/19 0855      Row Name 07/12/19 0840 07/12/19 0802          Sit-Stand Transfer    Sit-Stand [place] (Transfers)  verbal cues;minimum assist (75% patient effort)  -AS  minimum assist (75% patient effort);2 person assist;verbal cues;nonverbal cues (demo/gesture)  -BOB     Assistive Device (Sit-Stand Transfers)  walker, front-wheeled  -AS  walker, front-wheeled  -BOB     Recorded by [AS] Maria Antonia Dickens, PTA 07/12/19 0925 [BOB] Thelma Leonardo, OT 07/12/19 0855     Row Name 07/12/19 0840 07/12/19 0802          Stand-Sit Transfer    Stand-Sit [place] (Transfers)  verbal cues;minimum assist (75% patient effort)  -AS  contact guard;2 person assist;verbal cues;nonverbal cues (demo/gesture)  -BOB     Assistive Device (Stand-Sit Transfers)  walker, front-wheeled  -AS  walker, front-wheeled  -BOB     Recorded by [AS] Maria Antonia Dickens, PTA 07/12/19 0925 [BOB] Thelma Leonardo, OT 07/12/19 0855     Row Name 07/12/19 0802             Toilet Transfer    Type (Toilet Transfer)  sit-stand;stand-sit  -BOB      [place] Level (Toilet Transfer)  moderate assist (50% patient effort);contact guard;2 person assist mod on due to not reaching back, but CGA off toilet   -BOB      Assistive Device (Toilet Transfer)  commode, bedside without drop arms;walker, front-wheeled  -BOB      Recorded by [BOB] Thelma Leonardo, OT 07/12/19 0855      Row Name 07/12/19 0840              Gait/Stairs Assessment/Training    56513 - Gait Training Minutes   15  -AS      Gait/Stairs Assessment/Training  gait/ambulation assistive device  -AS      [place] Level (Gait)  verbal cues;minimum assist (75% patient effort);1 person to manage equipment  -AS      Assistive Device (Gait)  walker, front-wheeled  -AS      Distance in Feet (Gait)  14  -AS      Pattern (Gait)  step-to  -AS      Deviations/Abnormal Patterns (Gait)  bilateral deviations;opal decreased;gait speed decreased  -AS      Bilateral Gait Deviations  forward flexed posture;heel strike decreased;weight shift ability decreased  -AS      Comment (Gait/Stairs)  patient ambulated 14 feet with use of rolling walker for support, patient insists on leaning heavily on walker and will not correct despite verbal cues and education. Tech followed with chair for safety.  -AS      Recorded by [AS] Maria Antonia Dickens, PTA 07/12/19 0925      Row Name 07/12/19 0802             ADL Assessment/Intervention    38605 - OT Self Care/Mgmt Minutes  28  -BOB      BADL Assessment/Intervention  upper body dressing;lower body dressing;toileting;grooming  -BOB      Recorded by [BOB] Thelma Leonardo, OT 07/12/19 0855      Row Name 07/12/19 0802             Upper Body Dressing Assessment/Training    Upper Body Dressing [place] Level  don;pajama/robe;moderate assist (50% patient effort);nonverbal cues (demo/gesture);verbal cues  -BOB      Upper Body Dressing Position  unsupported sitting  -BOB      Comment (Upper Body Dressing)  limited some by JG FRANCO  -BOB      Recorded by [BOB] Thelma Leonardo, OT 07/12/19 0855      Row Name 07/12/19 0802             Lower Body Dressing Assessment/Training    Lower Body Dressing [place] Level  don;socks;dependent (less than 25% patient effort)  -BOB      Lower Body Dressing Position  edge of bed sitting  -BOB      Comment (Lower Body Dressing)  Pt. unable to reach feet to dress so issued and educated on use of AE, but declined to  practice today due to fatigue.  -BOB      Recorded by [BOB] Thelma Leonardo, OT 07/12/19 0855      Row Name 07/12/19 0802             Grooming Assessment/Training    [place] Level (Grooming)  hair care, combing/brushing;wash face, hands;oral care regimen;set up;supervision  -BOB      Grooming Position  edge of bed sitting  -BOB      Recorded by [BOB] Thelma Leonardo, OT 07/12/19 0855      Row Name 07/12/19 0802             Toileting Assessment/Training    [place] Level (Toileting)  adjust/manage clothing;moderate assist (50% patient effort);perform perineal hygiene;minimum assist (75% patient effort);verbal cues;nonverbal cues (demo/gesture)  -BOB      Assistive Devices (Toileting)  commode, bedside without drop arms  -BOB      Toileting Position  supported sitting  -BOB      Comment (Toileting)  Pt. wiped self after encouragement to do so.  Pt. once handed clean wipes cleaned self x 3 and then wiped over to make sure pt. got all.  -BOB      Recorded by [BOB] Thelma Leonardo, OT 07/12/19 0855      Row Name 07/12/19 0802             BADL Safety/Performance    Impairments, BADL Safety/Performance  balance;endurance/activity tolerance;shortness of breath;pain;trunk/postural control  -BOB      Skilled BADL Treatment/Intervention  adaptive equipment training;cognitive/safety deficit modifications  -BOB      Progress in BADL Status  independence level  -BOB      Recorded by [BOB] Thelma Leonardo, OT 07/12/19 0855      Row Name 07/12/19 0840 07/12/19 0802          Motor Skills Assessment/Interventions    Additional Documentation  Therapeutic Exercise (Group)  -AS  Balance (Group);Balance Interventions (Group);Therapeutic Exercise (Group);Therapeutic Exercise Interventions (Group)  -BOB     Recorded by [AS] Maria Antonia Dickens, PTA 07/12/19 0988 [BOB] Thelma Leonardo, OT 07/12/19 0855     Row Name 07/12/19 0840 07/12/19 0802          Therapeutic Exercise    16340 - PT Therapeutic Exercise Minutes  8  -AS  --     77957 - OT  Therapeutic Activity Minutes  --  11  -BOB     Recorded by [AS] Maria Antonia Dickens, PTA 07/12/19 0925 [BOB] Thelma Leonardo, OT 07/12/19 0855     Row Name 07/12/19 0840             Therapeutic Exercise    Lower Extremity (Therapeutic Exercise)  heel slides, bilateral;LAQ (long arc quad), bilateral limited right knee ROM due to chronic pain  -AS      Lower Extremity Range of Motion (Therapeutic Exercise)  ankle dorsiflexion/plantar flexion, bilateral  -AS      Recorded by [AS] Maria Antonia Dickens, PTA 07/12/19 0925      Row Name 07/12/19 0802             Static Standing Balance    Level of [place] (Supported Standing, Static Balance)  contact guard assist  -BOB      Time Able to Maintain Position (Supported Standing, Static Balance)  30 to 45 seconds  -BOB      Assistive Device Utilized (Supported Standing, Static Balance)  walker, rolling  -BOB      Recorded by [BOB] Thelma Leonardo, OT 07/12/19 0855      Row Name 07/12/19 0840 07/12/19 0802          Positioning and Restraints    Pre-Treatment Position  sitting in chair/recliner  -AS  in bed  -BOB     Post Treatment Position  chair  -AS  chair  -BOB     In Chair  reclined;call light within reach;encouraged to call for assist;exit alarm on  -AS  reclined;call light within reach;encouraged to call for assist;exit alarm on;waffle cushion;RLE elevated  -BOB     Recorded by [AS] Maria Antonia Dickens, PTA 07/12/19 0925 [BOB] Thelma Leonardo, OT 07/12/19 0855     Row Name 07/12/19 0840 07/12/19 0802          Pain Scale: Numbers Pre/Post-Treatment    Pain Scale: Numbers, Pretreatment  4/10  -AS  2/10  -BOB     Pain Scale: Numbers, Post-Treatment  3/10  -AS  2/10  -BOB     Pain Location - Side  Right  -AS  Right  -BOB     Pain Location - Orientation  lower  -AS  lower  -BOB     Pain Location  extremity  -AS  extremity  -BOB     Pain Intervention(s)  Repositioned;Ambulation/increased activity  -AS  Ambulation/increased activity;Repositioned  -BOB     Recorded by [AS] Maria Antonia Dickens  Tatiana, PTA 07/12/19 0925 [BOB] Thelma Leonardo, OT 07/12/19 0855     Row Name                Wound 07/05/19 2340 Right groin incision    Wound - Properties Group Date first assessed: 07/05/19 [JS] Time first assessed: 2340 [JS] Side: Right [JS] Location: groin [JS] Type: incision [JS] Additional Comments: right groin Aquacel AG [JR] Recorded by:  [JR] Lissy Landa RN 07/05/19 2340 [JS] Jodie Burr RN 07/05/19 2340    Row Name 07/12/19 0802             Plan of Care Review    Plan of Care Reviewed With  patient  -BOB      Recorded by [BOB] Thelma Leonardo, MONO 07/12/19 0855      Row Name 07/12/19 0802             Outcome Summary/Treatment Plan (OT)    Daily Summary of Progress (OT)  progress toward functional goals is good  -BOB      Barriers to Overall Progress (OT)  endurance, safety  -BOB      Plan for Continued Treatment (OT)  cont OT POC pending discharge today to SNF  -BOB      Recorded by [BOB] Thelma Leonardo, OT 07/12/19 0855        User Key  (r) = Recorded By, (t) = Taken By, (c) = Cosigned By    Initials Name Effective Dates Discipline    BOB Thelma Leonardo, OT 06/08/18 -  OT    AS Chrissie Maria Antonianikko Simpson, PTA 06/22/15 -  PT    Lissy Mario RN 06/16/16 -  Nurse    JS Jodie Burr RN 01/23/17 -  Nurse          Wound 07/05/19 2340 Right groin incision (Active)   Dressing Appearance dry;intact;no drainage 7/11/2019  8:55 PM   Closure Open to air 7/11/2019  8:55 PM   Base pink;scab 7/11/2019  8:55 PM   Periwound intact;dry;blanchable 7/11/2019  8:55 PM   Drainage Amount none 7/11/2019  8:55 PM   Periwound Care, Wound dry periwound area maintained 7/11/2019  8:55 PM       Rehab Goal Summary     Row Name 07/12/19 0802             Bed Mobility Goal 1 (OT)    Progress/Outcomes (Bed Mobility Goal 1, OT)  goal partially met met supine to sit only  -BOB         Transfer Goal 1 (OT)    Progress/Outcome (Transfer Goal 1, OT)  goal partially met met off toilet only  -BOB         Toileting Goal 1 (OT)     Progress/Outcome (Toileting Goal 1, OT)  goal partially met met hygeine  -BOB         Grooming Goal 1 (OT)    Progress/Outcome (Grooming Goal 1, OT)  goal met  -BOB        User Key  (r) = Recorded By, (t) = Taken By, (c) = Cosigned By    Initials Name Provider Type Discipline    Thelma Lewis, OT Occupational Therapist OT          Physical Therapy Education     Title: PT OT SLP Therapies (In Progress)     Topic: Physical Therapy (In Progress)     Point: Mobility training (In Progress)     Learning Progress Summary           Patient Acceptance, E, NR by AS at 7/12/2019  9:25 AM    Acceptance, E, NR by KR at 7/10/2019  8:49 AM    Acceptance, TB, NR by KL at 7/9/2019 11:10 AM                   Point: Home exercise program (In Progress)     Learning Progress Summary           Patient Acceptance, E, NR by AS at 7/12/2019  9:25 AM    Acceptance, E, NR by KR at 7/10/2019  8:49 AM    Acceptance, TB, NR by KL at 7/9/2019 11:10 AM                   Point: Body mechanics (In Progress)     Learning Progress Summary           Patient Acceptance, E, NR by AS at 7/12/2019  9:25 AM    Acceptance, E, NR by KR at 7/10/2019  8:49 AM    Acceptance, TB, NR by KL at 7/9/2019 11:10 AM                   Point: Precautions (In Progress)     Learning Progress Summary           Patient Acceptance, E, NR by AS at 7/12/2019  9:25 AM    Acceptance, E, NR by KR at 7/10/2019  8:49 AM    Acceptance, TB, NR by KL at 7/9/2019 11:10 AM                               User Key     Initials Effective Dates Name Provider Type Discipline    AS 06/22/15 -  Maria Antonia Dickens, PTA Physical Therapy Assistant PT    KR 04/03/18 -  Nina Stoddard, PT Physical Therapist PT    KL 05/15/19 -  Joaquin Jacob, PT Student PT Student PT                PT Recommendation and Plan     Plan of Care Reviewed With: patient  Progress: improving  Outcome Summary: patient ambulated 14 feet with use of rolling walker for support, patient insists on leaning heavily on walker  and will not correct despite verbal cues and education. Tech followed with chair for safety  Outcome Measures     Row Name 07/12/19 0840 07/12/19 0802 07/10/19 0849       How much help from another person do you currently need...    Turning from your back to your side while in flat bed without using bedrails?  2  -AS  --  2  -KR    Moving from lying on back to sitting on the side of a flat bed without bedrails?  2  -AS  --  2  -KR    Moving to and from a bed to a chair (including a wheelchair)?  3  -AS  --  2  -KR    Standing up from a chair using your arms (e.g., wheelchair, bedside chair)?  3  -AS  --  2  -KR    Climbing 3-5 steps with a railing?  2  -AS  --  1  -KR    To walk in hospital room?  3  -AS  --  2  -KR    AM-PAC 6 Clicks Score (PT)  15  -AS  --  11  -KR       How much help from another is currently needed...    Putting on and taking off regular lower body clothing?  --  1  -BOB  --    Bathing (including washing, rinsing, and drying)  --  2  -BOB  --    Toileting (which includes using toilet bed pan or urinal)  --  2  -BOB  --    Putting on and taking off regular upper body clothing  --  3  -BOB  --    Taking care of personal grooming (such as brushing teeth)  --  4 post setup, sitting  -BOB  --    Eating meals  --  4  -BOB  --    AM-PAC 6 Clicks Score (OT)  --  16  -BOB  --       Functional Assessment    Outcome Measure Options  AM-PAC 6 Clicks Basic Mobility (PT)  -AS  AM-PAC 6 Clicks Daily Activity (OT)  -BOB  AM-PAC 6 Clicks Basic Mobility (PT)  -KR    Row Name 07/09/19 1110 07/09/19 0945          How much help from another person do you currently need...    Turning from your back to your side while in flat bed without using bedrails?  2  -JBA (r) KL (t) JBA (c)  --     Moving from lying on back to sitting on the side of a flat bed without bedrails?  2  -JBA (r) KL (t) JBA (c)  --     Moving to and from a bed to a chair (including a wheelchair)?  3  -JBA (r) YAMILET (t) JBA (c)  --     Standing up from a chair  using your arms (e.g., wheelchair, bedside chair)?  3  -JBA (r) KL (t) JBA (c)  --     Climbing 3-5 steps with a railing?  2  -JBA (r) KL (t) JBA (c)  --     To walk in hospital room?  3  -JBA (r) KL (t) JBA (c)  --     AM-PAC 6 Clicks Score (PT)  15  -JBA (r) KL (t)  --        How much help from another is currently needed...    Putting on and taking off regular lower body clothing?  --  1  -HK     Bathing (including washing, rinsing, and drying)  --  2  -HK     Toileting (which includes using toilet bed pan or urinal)  --  2  -HK     Putting on and taking off regular upper body clothing  --  3  -HK     Taking care of personal grooming (such as brushing teeth)  --  3  -HK     Eating meals  --  3  -HK     AM-PAC 6 Clicks Score (OT)  --  14  -HK        Functional Assessment    Outcome Measure Options  AM-PAC 6 Clicks Basic Mobility (PT)  -JBA (r) KL (t) JBA (c)  AM-PAC 6 Clicks Daily Activity (OT)  -HK       User Key  (r) = Recorded By, (t) = Taken By, (c) = Cosigned By    Initials Name Provider Type    Maye Valentine, PT Physical Therapist    Thelma Lewis, OT Occupational Therapist    AS Maria Antonia Dickens, LAVON Physical Therapy Assistant    Nina Perry, PT Physical Therapist    Carolina Sullivan, OT Occupational Therapist    Joaquin Crabtree, PT Student PT Student         Time Calculation:   PT Charges     Row Name 07/12/19 0840             Time Calculation    Start Time  0840  -AS      PT Received On  07/12/19  -AS      PT Goal Re-Cert Due Date  07/19/19  -AS         Timed Charges    30038 - PT Therapeutic Exercise Minutes  8  -AS      67445 - Gait Training Minutes   15  -AS        User Key  (r) = Recorded By, (t) = Taken By, (c) = Cosigned By    Initials Name Provider Type    AS Maria Antonia Dickens, PTA Physical Therapy Assistant        Therapy Charges for Today     Code Description Service Date Service Provider Modifiers Qty    30306753413 HC GAIT TRAINING EA 15 MIN 7/12/2019 Maria Antonia Dickens  Tatiana, PTA GP 1    51386263555 HC PT THER PROC EA 15 MIN 7/12/2019 Maria Antonia Dickens, PTA GP 1    81784939497 HC PT THER SUPP EA 15 MIN 7/12/2019 Maria Antonia Dickens, PTA GP 2          PT G-Codes  Outcome Measure Options: AM-PAC 6 Clicks Basic Mobility (PT)  AM-PAC 6 Clicks Score (PT): 15  AM-PAC 6 Clicks Score (OT): 16    Maria Antonia Dickens PTA  7/12/2019

## 2019-07-12 NOTE — PLAN OF CARE
Problem: Patient Care Overview  Goal: Plan of Care Review  Outcome: Ongoing (interventions implemented as appropriate)   07/12/19 6308   Coping/Psychosocial   Plan of Care Reviewed With patient   Plan of Care Review   Progress improving   OTHER   Outcome Summary patient ambulated 14 feet with use of rolling walker for support, patient insists on leaning heavily on walker and will not correct despite verbal cues and education. Tech followed with chair for safety

## 2019-07-12 NOTE — THERAPY TREATMENT NOTE
Acute Care - Occupational Therapy Treatment Note  [facility]     Patient Name: Bettye Sargent  : 3/28/1927  MRN: [id number]  Today's Date: 2019  Onset of Illness/Injury or Date of Surgery: 19  Date of Referral to OT: 19  Referring Physician: Candido Dillon MD    Admit Date: 2019       ICD-10-CM ICD-9-CM   1. Arterial occlusion, lower extremity (CMS/HCC) I70.209 444.22   2. Impaired functional mobility, balance, gait, and endurance Z74.09 V49.89   3. Impaired mobility and ADLs Z74.09 799.89     Patient Active Problem List   Diagnosis   • Acute ischemic leg, s/p thrombectomy (Dr. Dillon, 19)   • Renal insufficiency, acute versus chronic   • Chronic Tachycardia   • Anemia   • Arterial occlusion, lower extremity (CMS/HCC)   • RLL Infiltrate (R/O Aspiration pneumonia)     Past Medical History:   Diagnosis Date   • Neuropathy    • Shingles      Past Surgical History:   Procedure Laterality Date   • FEMORAL THROMBECTOMY/EMBOLECTOMY Right 2019    Procedure: Right Femoral artery cutdown with Angioplasty and stent of the proximal right common iliac artery;  Surgeon: Candido Dillon MD;  Location: [place] [address];  Service: Cardiothoracic       Therapy Treatment    Rehabilitation Treatment Summary     Row Name 19 0802             Treatment Time/Intention    Discipline  occupational therapist  -BOB      Document Type  therapy note (daily note)  -BOB      Subjective Information  complains of;pain discomfort from bed  -BOB      Mode of Treatment  individual therapy;occupational therapy  -BOB      Patient/Family Observations  No family present.  Pt. supine in bed on tele with IV.  -BOB      Care Plan Review  evaluation/treatment results reviewed;care plan/treatment goals reviewed;risks/benefits reviewed;current/potential barriers reviewed;patient/other agree to care plan  -BOB      Therapy Frequency (OT Eval)  daily  -BOB      Patient Effort  good  -BOB      Existing  Precautions/Restrictions  fall  -BOB      Equipment Issued to Patient  bathing equipment;dressing equipment  -BOB      Recorded by [BOB] Thelma Leonardo, OT 07/12/19 0855      Row Name 07/12/19 0802             Vital Signs    Pre Systolic BP Rehab  186  -BOB      Pre Treatment Diastolic BP  70 nurse OK tx up to chair, nurse to bring morning meds  -BOB      Post Systolic BP Rehab  -- BP cuff hurts pt.'s UE not wanting another reading now  -BOB      Pretreatment Heart Rate (beats/min)  76  -BOB      Posttreatment Heart Rate (beats/min)  79  -BOB      Pre SpO2 (%)  90  -BOB      O2 Delivery Pre Treatment  room air  -BOB      Post SpO2 (%)  97  -BOB      O2 Delivery Post Treatment  room air  -BOB      Pre Patient Position  Supine  -BOB      Intra Patient Position  Standing  -BOB      Post Patient Position  Sitting  -BOB      Recorded by [BOB] Thelma Leonardo, OT 07/12/19 0855      Row Name 07/12/19 0802             Cognitive Assessment/Intervention- PT/OT    Affect/Mental Status (Cognitive)  anxious  -BOB      Orientation Status (Cognition)  oriented to;person  -BOB      Follows Commands (Cognition)  follows one step commands;over 90% accuracy;repetition of directions required  -BOB      Cognitive Function (Cognitive)  safety deficit  -BOB      Safety Deficit (Cognitive)  mild deficit;safety precautions awareness;safety precautions follow-through/compliance  -BOB      Personal Safety Interventions  fall prevention program maintained;gait belt;nonskid shoes/slippers when out of bed  -BOB      Recorded by [BOB] Thelma Leonardo, OT 07/12/19 0855      Row Name 07/12/19 0802             Safety Issues, Functional Mobility    Safety Issues Affecting Function (Mobility)  safety precaution awareness;safety precautions follow-through/compliance  -BOB      Impairments Affecting Function (Mobility)  balance;endurance/activity tolerance;strength;postural/trunk control;pain  -BOB      Recorded by [BOB] Thelma Leonardo, OT 07/12/19 0855      Row Name 07/12/19  0802             Bed Mobility Assessment/Treatment    Bed Mobility Assessment/Treatment  supine-sit;scooting/bridging  -BOB      Scooting/Bridging [place] (Bed Mobility)  maximum assist (25% patient effort);nonverbal cues (demo/gesture);verbal cues  -BOB      Supine-Sit [place] (Bed Mobility)  moderate assist (50% patient effort);nonverbal cues (demo/gesture);verbal cues  -BOB      Bed Mobility, Safety Issues  decreased use of arms for pushing/pulling;decreased use of legs for bridging/pushing;impaired trunk control for bed mobility  -BOB      Assistive Device (Bed Mobility)  draw sheet;head of bed elevated;bed rails  -BOB      Comment (Bed Mobility)  cues to sequence task, pt. worried about hurting RLE and not assisting as much as appears able to  -BOB      Recorded by [BOB] Thelma Leonardo, OT 07/12/19 0855      Row Name 07/12/19 0802             Functional Mobility    Functional Mobility- Ind. Level  minimum assist (75% patient effort);2 person assist required  -BOB      Functional Mobility- Device  rolling walker  -BOB      Functional Mobility-Distance (Feet)  6 steps to BSC then to recliner  -BOB      Functional Mobility- Safety Issues  step length decreased;weight-shifting ability decreased very flexed posture.  -BOB      Functional Mobility- Comment  Trying to cue pt. to stand more upright, but pt. stating she is more likely to fall and declined.  Pt. wanting to rest forearms on wx hand .  Pt. needing assist to steer walker.  -BOB      Recorded by [BOB] Thelma Leonardo, OT 07/12/19 0855      Row Name 07/12/19 0802             Transfer Assessment/Treatment    Transfer Assessment/Treatment  sit-stand transfer;stand-sit transfer;toilet transfer  -BOB      Comment (Transfers)  verbal cues for hand placement.  Pt. declined to push off bed or reach back for BSC, but with further encouragement pushed off BSC and reached back for chair arm rest.  -BOB      Recorded by [BOB] Thelma Leonardo, OT 07/12/19 0855      Maribeth  Name 07/12/19 0802             Sit-Stand Transfer    Sit-Stand [place] (Transfers)  minimum assist (75% patient effort);2 person assist;verbal cues;nonverbal cues (demo/gesture)  -BOB      Assistive Device (Sit-Stand Transfers)  walker, front-wheeled  -BOB      Recorded by [BOB] Thelma Leonardo, OT 07/12/19 0855      Row Name 07/12/19 0802             Stand-Sit Transfer    Stand-Sit [place] (Transfers)  contact guard;2 person assist;verbal cues;nonverbal cues (demo/gesture)  -BOB      Assistive Device (Stand-Sit Transfers)  walker, front-wheeled  -BOB      Recorded by [BOB] Thelma Leonardo, OT 07/12/19 0855      Row Name 07/12/19 0802             Toilet Transfer    Type (Toilet Transfer)  sit-stand;stand-sit  -BOB      [place] Level (Toilet Transfer)  moderate assist (50% patient effort);contact guard;2 person assist mod on due to not reaching back, but CGA off toilet   -BOB      Assistive Device (Toilet Transfer)  commode, bedside without drop arms;walker, front-wheeled  -BOB      Recorded by [BOB] Thelma Leonardo, OT 07/12/19 0855      Row Name 07/12/19 0802             ADL Assessment/Intervention    78162 - OT Self Care/Mgmt Minutes  28  -BOB      BADL Assessment/Intervention  upper body dressing;lower body dressing;toileting;grooming  -BOB      Recorded by [BOB] Thelma Leonardo, OT 07/12/19 0855      Row Name 07/12/19 0802             Upper Body Dressing Assessment/Training    Upper Body Dressing [place] Level  don;pajama/robe;moderate assist (50% patient effort);nonverbal cues (demo/gesture);verbal cues  -BOB      Upper Body Dressing Position  unsupported sitting  -BOB      Comment (Upper Body Dressing)  limited some by IV MALUE  -BOB      Recorded by [BOB] Thelma Leonardo, OT 07/12/19 0855      Row Name 07/12/19 0802             Lower Body Dressing Assessment/Training    Lower Body Dressing [place] Level  don;socks;dependent (less than 25% patient effort)  -BOB      Lower Body Dressing Position  edge of  bed sitting  -BOB      Comment (Lower Body Dressing)  Pt. unable to reach feet to dress so issued and educated on use of AE, but declined to practice today due to fatigue.  -BOB      Recorded by [BOB] Thelma Leonardo, OT 07/12/19 0855      Row Name 07/12/19 0802             Grooming Assessment/Training    [place] Level (Grooming)  hair care, combing/brushing;wash face, hands;oral care regimen;set up;supervision  -BOB      Grooming Position  edge of bed sitting  -BOB      Recorded by [BOB] Thelma Leonardo, OT 07/12/19 0855      Row Name 07/12/19 0802             Toileting Assessment/Training    [place] Level (Toileting)  adjust/manage clothing;moderate assist (50% patient effort);perform perineal hygiene;minimum assist (75% patient effort);verbal cues;nonverbal cues (demo/gesture)  -BOB      Assistive Devices (Toileting)  commode, bedside without drop arms  -BOB      Toileting Position  supported sitting  -BOB      Comment (Toileting)  Pt. wiped self after encouragement to do so.  Pt. once handed clean wipes cleaned self x 3 and then wiped over to make sure pt. got all.  -BOB      Recorded by [BOB] Thelma Leonardo, OT 07/12/19 0855      Row Name 07/12/19 0802             BADL Safety/Performance    Impairments, BADL Safety/Performance  balance;endurance/activity tolerance;shortness of breath;pain;trunk/postural control  -BOB      Skilled BADL Treatment/Intervention  adaptive equipment training;cognitive/safety deficit modifications  -BOB      Progress in BADL Status  independence level  -BOB      Recorded by [BOB] Thelma Leonardo OT 07/12/19 0855      Row Name 07/12/19 0802             Motor Skills Assessment/Interventions    Additional Documentation  Balance (Group);Balance Interventions (Group);Therapeutic Exercise (Group);Therapeutic Exercise Interventions (Group)  -BOB      Recorded by [BOB] Thelma Leonardo OT 07/12/19 0855      Row Name 07/12/19 0802             Therapeutic Exercise    22753 - OT Therapeutic Activity  Minutes  11  -BOB      Recorded by [BOB] Thelma Leonardo, OT 07/12/19 0855      Row Name 07/12/19 0802             Static Standing Balance    Level of [place] (Supported Standing, Static Balance)  contact guard assist  -BOB      Time Able to Maintain Position (Supported Standing, Static Balance)  30 to 45 seconds  -BOB      Assistive Device Utilized (Supported Standing, Static Balance)  walker, rolling  -BOB      Recorded by [BOB] Thelma Leonardo OT 07/12/19 0855      Row Name 07/12/19 0802             Positioning and Restraints    Pre-Treatment Position  in bed  -BOB      Post Treatment Position  chair  -BOB      In Chair  reclined;call light within reach;encouraged to call for assist;exit alarm on;waffle cushion;RLE elevated  -BOB      Recorded by [BOB] Thelma Leonardo OT 07/12/19 0855      Row Name 07/12/19 0802             Pain Scale: Numbers Pre/Post-Treatment    Pain Scale: Numbers, Pretreatment  2/10  -BOB      Pain Scale: Numbers, Post-Treatment  2/10  -BOB      Pain Location - Side  Right  -BOB      Pain Location - Orientation  lower  -BOB      Pain Location  extremity  -BOB      Pain Intervention(s)  Ambulation/increased activity;Repositioned  -BOB      Recorded by [BOB] Thelma Leonardo, OT 07/12/19 0855      Row Name                Wound 07/05/19 2340 Right groin incision    Wound - Properties Group Date first assessed: 07/05/19 [JS] Time first assessed: 2340 [JS] Side: Right [JS] Location: groin [JS] Type: incision [JS] Additional Comments: right groin Aquacel AG [JR] Recorded by:  [JR] Lissy Landa RN 07/05/19 2340 [JS] Jodie Burr RN 07/05/19 2340    Row Name 07/12/19 0802             Plan of Care Review    Plan of Care Reviewed With  patient  -BOB      Recorded by [BOB] Thelma Leonardo OT 07/12/19 0855      Row Name 07/12/19 0802             Outcome Summary/Treatment Plan (OT)    Daily Summary of Progress (OT)  progress toward functional goals is good  -BOB      Barriers to Overall Progress (OT)   endurance, safety  -BOB      Plan for Continued Treatment (OT)  cont OT POC pending discharge today to SNF  -BOB      Recorded by [BOB] Jorden Thelma Mckay, OT 07/12/19 0855        User Key  (r) = Recorded By, (t) = Taken By, (c) = Cosigned By    Initials Name Effective Dates Discipline    Thelma Lewis, OT 06/08/18 -  OT    Lissy Mario RN 06/16/16 -  Nurse    Jodie Mcclure RN 01/23/17 -  Nurse        Wound 07/05/19 2340 Right groin incision (Active)   Dressing Appearance dry;intact;no drainage 7/11/2019  8:55 PM   Closure Open to air 7/11/2019  8:55 PM   Base pink;scab 7/11/2019  8:55 PM   Periwound intact;dry;blanchable 7/11/2019  8:55 PM   Drainage Amount none 7/11/2019  8:55 PM   Periwound Care, Wound dry periwound area maintained 7/11/2019  8:55 PM     Rehab Goal Summary     Row Name 07/12/19 0802             Bed Mobility Goal 1 (OT)    Progress/Outcomes (Bed Mobility Goal 1, OT)  goal partially met met supine to sit only  -BOB         Transfer Goal 1 (OT)    Progress/Outcome (Transfer Goal 1, OT)  goal partially met met off toilet only  -BOB         Toileting Goal 1 (OT)    Progress/Outcome (Toileting Goal 1, OT)  goal partially met met hygeine  -BOB         Grooming Goal 1 (OT)    Progress/Outcome (Grooming Goal 1, OT)  goal met  -BOB        User Key  (r) = Recorded By, (t) = Taken By, (c) = Cosigned By    Initials Name Provider Type Discipline    Thelma Lewis, OT Occupational Therapist OT        Occupational Therapy Education     Title: PT OT SLP Therapies (In Progress)     Topic: Occupational Therapy (In Progress)     Point: ADL training (In Progress)     Description: Instruct learner(s) on proper safety adaptation and remediation techniques during self care or transfers.   Instruct in proper use of assistive devices.    Learning Progress Summary           Patient Acceptance, E,D, NR by BOB at 7/12/2019  8:02 AM    Comment:  bed mobility, trasnfer safety, how posture can effect back pain,  benefits of activity, AE use for increased ADL independence    Acceptance, E, VU by HK at 7/9/2019  9:45 AM    Comment:  Pt educated on ADL retraining with bed mobility and transfers, safety precautions and appropriate body mechanics.                   Point: Precautions (In Progress)     Description: Instruct learner(s) on prescribed precautions during self-care and functional transfers.    Learning Progress Summary           Patient Acceptance, E,D, NR by BOB at 7/12/2019  8:02 AM    Comment:  bed mobility, trasnfer safety, how posture can effect back pain, benefits of activity, AE use for increased ADL independence    Acceptance, E, VU by HK at 7/9/2019  9:45 AM    Comment:  Pt educated on ADL retraining with bed mobility and transfers, safety precautions and appropriate body mechanics.                   Point: Body mechanics (In Progress)     Description: Instruct learner(s) on proper positioning and spine alignment during self-care, functional mobility activities and/or exercises.    Learning Progress Summary           Patient Acceptance, E,D, NR by BOB at 7/12/2019  8:02 AM    Comment:  bed mobility, trasnfer safety, how posture can effect back pain, benefits of activity, AE use for increased ADL independence    Acceptance, E, VU by HK at 7/9/2019  9:45 AM    Comment:  Pt educated on ADL retraining with bed mobility and transfers, safety precautions and appropriate body mechanics.                               User Key     Initials Effective Dates Name Provider Type Discipline     06/08/18 -  Thelma Leonardo, OT Occupational Therapist OT     03/07/18 -  Carolina Lizarraga OT Occupational Therapist OT                OT Recommendation and Plan  Outcome Summary/Treatment Plan (OT)  Daily Summary of Progress (OT): progress toward functional goals is good  Barriers to Overall Progress (OT): endurance, safety  Plan for Continued Treatment (OT): cont OT POC pending discharge today to SNF  Therapy Frequency (OT Eval):  daily  Daily Summary of Progress (OT): progress toward functional goals is good  Plan of Care Review  Plan of Care Reviewed With: patient  Plan of Care Reviewed With: patient  Outcome Summary: Pt. partially met several goals and met grooming goal.  Pt. with less pain today.  Pt. unsafe practices with transfers and walker use, but only partially open to education.  AE issued to improve pt.'s LBD and LBB.  Possible discharge to SNF today.  Outcome Measures     Row Name 07/12/19 0802 07/10/19 0849 07/09/19 1110       How much help from another person do you currently need...    Turning from your back to your side while in flat bed without using bedrails?  --  2  -KR  2  -BOB (r) KL (t) BOB (c)    Moving from lying on back to sitting on the side of a flat bed without bedrails?  --  2  -KR  2  -BOB (r) KL (t) BOB (c)    Moving to and from a bed to a chair (including a wheelchair)?  --  2  -KR  3  -BOB (r) KL (t) BOB (c)    Standing up from a chair using your arms (e.g., wheelchair, bedside chair)?  --  2  -KR  3  -BOB (r) KL (t) BOB (c)    Climbing 3-5 steps with a railing?  --  1  -KR  2  -BOB (r) KL (t) BOB (c)    To walk in hospital room?  --  2  -KR  3  -BOB (r) KL (t) BOB (c)    AM-PAC 6 Clicks Score (PT)  --  11  -KR  15  -BOB (r) KL (t)       How much help from another is currently needed...    Putting on and taking off regular lower body clothing?  1  -JBA  --  --    Bathing (including washing, rinsing, and drying)  2  -JBA  --  --    Toileting (which includes using toilet bed pan or urinal)  2  -JBA  --  --    Putting on and taking off regular upper body clothing  3  -JBA  --  --    Taking care of personal grooming (such as brushing teeth)  4 post setup, sitting  -JBA  --  --    Eating meals  4  -JBA  --  --    AM-PAC 6 Clicks Score (OT)  16  -JBA  --  --       Functional Assessment    Outcome Measure Options  AM-PAC 6 Clicks Daily Activity (OT)  -JBA  AM-PAC 6 Clicks Basic Mobility (PT)  -LASHAE  AM-PAC 6 Clicks Basic Mobility  (PT)  -BOB (r) KL (t) BOB (c)    Row Name 07/09/19 0945             How much help from another is currently needed...    Putting on and taking off regular lower body clothing?  1  -HK      Bathing (including washing, rinsing, and drying)  2  -HK      Toileting (which includes using toilet bed pan or urinal)  2  -HK      Putting on and taking off regular upper body clothing  3  -HK      Taking care of personal grooming (such as brushing teeth)  3  -HK      Eating meals  3  -HK      AM-PAC 6 Clicks Score (OT)  14  -HK         Functional Assessment    Outcome Measure Options  AM-PAC 6 Clicks Daily Activity (OT)  -HK        User Key  (r) = Recorded By, (t) = Taken By, (c) = Cosigned By    Initials Name Provider Type    Maye Otero, PT Physical Therapist    Thelma Mcrae, OT Occupational Therapist    Nina Perry, PT Physical Therapist    Carolina Sullivan, OT Occupational Therapist    Joaquin Crabtree, PT Student PT Student           Time Calculation:   Time Calculation- OT     Row Name 07/12/19 0802             Time Calculation- OT    OT Start Time  0802  -BOB      OT Received On  07/12/19  -BOB      OT Goal Re-Cert Due Date  07/19/19  -BOB         Timed Charges    43344 - OT Therapeutic Activity Minutes  11  -BOB      81359 - OT Self Care/Mgmt Minutes  28  -BOB        User Key  (r) = Recorded By, (t) = Taken By, (c) = Cosigned By    Initials Name Provider Type    Thelma Lewis, OT Occupational Therapist        Therapy Charges for Today     Code Description Service Date Service Provider Modifiers Qty    58865418248 HC OT THERAPEUTIC ACT EA 15 MIN 7/12/2019 Thelma Leonardo OT GO 1    38881467597 HC OT SELF CARE/MGMT/TRAIN EA 15 MIN 7/12/2019 Thelma Leonardo OT GO 2    82649340261 HC OT THER SUPP EA 15 MIN 7/12/2019 Thelma Leonardo OT GO 2               Thelma Leonardo OT  7/12/2019

## 2019-07-12 NOTE — DISCHARGE INSTR - OTHER ORDERS
"[facility] notes:  Drive to the \"front overhang\" of the [facility] at .  The number you can call to let them know you are there is [phone].  The business office will contact the transporters to come meet you in your vehicle to bring pt into the facility.  You can also call in route to let them know you are almost there to expedite the process.  Pt is going to room 223. Thank you.   "

## 2019-07-14 NOTE — TELEPHONE ENCOUNTER
"Caller is granddaughter. Patient was discharged 07/12/2019  to [facility], an assisted living facility.  The grand daughter says this is not going to work she needs a rehab facility. She is wanting direction on how to proceed. Advised to call PCP or Surgeon for a referral to rehab. I told the grand daughter I would forward this note to case management to see if there is anything they can do to assist.  Reason for Disposition  • General information question, no triage required and triager able to answer question    Additional Information  • Negative: [1] Caller is not with the adult (patient) AND [2] reporting urgent symptoms  • Negative: Lab result questions  • Negative: Medication questions  • Negative: Caller cannot be reached by phone  • Negative: Caller has already spoken to PCP or another triager  • Negative: RN needs further essential information from caller in order to complete triage  • Negative: Requesting regular office appointment  • Negative: [1] Caller requesting NON-URGENT health information AND [2] PCP's office is the best resource  • Negative: Health Information question, no triage required and triager able to answer question    Answer Assessment - Initial Assessment Questions  1. REASON FOR CALL or QUESTION: \"What is your reason for calling today?\" or \"How can I best help you?\" or \"What question do you have that I can help answer?\"     Caller says her grandmother needs a rehab facility    Protocols used: INFORMATION ONLY CALL-ADULT-      "

## 2019-07-15 NOTE — OUTREACH NOTE
Case Management Call Center Follow-up      Responses   [facility] Call Center Tracking Reason?  Other (specify in comments)   Other Tracking Comments  Pt's family not satisfied with placement   Has the Call Center Case Management Follow-up issue been resolved?  Yes   Follow-up Comments  Spoke with pt's daughter. Pt was discharged to [facility] for [facility]hab. Pt and pt's daughter were not satisfied so took her home to her assisted living apartment and arranged for pt to go to [facility] today but now they have decided to keep her home and do home health. JOANA explained they need ot call pt's PCP for home health order. Pt's daughter reports she will follow up with PCP and asked [facility] to notify [facility] that pt not admitting there. [facility] notified [facility].           MATTHEW Casiano    7/15/2019, 1:48 PM

## 2019-08-01 NOTE — PROGRESS NOTES
08/01/2019  Patient Information  Bettye Sargent                                                                                          [address]  [place] [address]   3/28/1927  'PCP/Referring Physician'  Anand Akins MD  [phone]  No ref. provider found    Chief Complaint   Patient presents with   • Peripheral Vascular Disease     Hospital follow-up s/p angioplasty and stent of right common iliac and right external iliac graft 7/5/19.       History of Present Illness:  Patient is a 89-year-old  female with history of acute ischemic right lower extremity status post right femoral cutdown with angioma and stenting of the right iliac artery as well as thrombectomy performed 7/5/2019 who presents to office for postop evaluation.  The patient denies any right foot numbness, lower extremity claudication pain, rest pain or nocturnal pain and states she is able to ambulate well with the use of her walker.  The patient is currently on aspirin and Plavix, and will need to remain on this.    Patient Active Problem List   Diagnosis   • Acute ischemic leg, s/p thrombectomy (Dr. Dillon, 7/5/19)   • Renal insufficiency, acute versus chronic   • Chronic Tachycardia   • Anemia   • Arterial occlusion, lower extremity (CMS/HCC)   • Aspiration pneumonia (CMS/HCC)     Past Medical History:   Diagnosis Date   • Neuropathy    • Peripheral vascular disease (CMS/HCC)    • Shingles    • Tachycardia      Past Surgical History:   Procedure Laterality Date   • FEMORAL THROMBECTOMY/EMBOLECTOMY Right 7/5/2019    Procedure: Right Femoral artery cutdown with Angioplasty and stent of the proximal right common iliac artery;  Surgeon: Candido Dillon MD;  Location: [place] [address];  Service: Cardiothoracic       Current Outpatient Medications:   •  amLODIPine (NORVASC) 5 MG tablet, Take 1 tablet by mouth 2 (Two) Times a Day., Disp: 60 tablet, Rfl: 5  •  aspirin 325 MG tablet, Take 325 mg by mouth Daily., Disp:  , Rfl:   •  atenolol (TENORMIN) 50 MG tablet, Take 50 mg by mouth Daily., Disp: , Rfl:   •  bimatoprost (LUMIGAN) 0.01 % ophthalmic drops, Administer 1 drop to both eyes Every Night., Disp: , Rfl:   •  clopidogrel (PLAVIX) 75 MG tablet, Take 1 tablet by mouth Daily., Disp: 30 tablet, Rfl: 5  •  fluticasone (FLONASE) 50 MCG/ACT nasal spray, 2 sprays by Each Nare route Daily As Needed for Congestion., Disp: , Rfl:   •  gabapentin (NEURONTIN) 300 MG capsule, Take 300 mg by mouth Daily As Needed., Disp: , Rfl:   •  acetaminophen (TYLENOL) 325 MG tablet, Take 2 tablets by mouth Every 4 (Four) Hours As Needed for Mild Pain ., Disp: 1 bottle, Rfl: 0  Allergies   Allergen Reactions   • Sulfa Antibiotics Other (See Comments)     Unknown     • Amoxicillin Rash     Social History     Socioeconomic History   • Marital status:      Spouse name: Not on file   • Number of children: 5   • Years of education: Not on file   • Highest education level: Not on file   Occupational History   • Occupation: Homemaker   Tobacco Use   • Smoking status: Former Smoker   • Smokeless tobacco: Never Used   Substance and Sexual Activity   • Alcohol use: No     Frequency: Never   • Drug use: No   Social History Narrative    Lives in [place], [place] at assisted living facility     Family History   Problem Relation Age of Onset   • No Known Problems Mother      Review of Systems   Constitution: Positive for decreased appetite and malaise/fatigue. Negative for chills, fever, night sweats and weight loss.   HENT: Negative for hearing loss, odynophagia and sore throat.    Cardiovascular: Negative for chest pain, dyspnea on exertion, leg swelling, orthopnea and palpitations.   Respiratory: Negative for cough and hemoptysis.    Endocrine: Negative for cold intolerance, heat intolerance, polydipsia, polyphagia and polyuria.   Hematologic/Lymphatic: Does not bruise/bleed easily.   Skin: Negative for itching and rash.   Musculoskeletal: Negative for  "joint pain, joint swelling and myalgias.   Gastrointestinal: Positive for nausea. Negative for abdominal pain, constipation, diarrhea, hematemesis, hematochezia, melena and vomiting.   Genitourinary: Negative for dysuria, frequency and hematuria.   Neurological: Positive for dizziness, light-headedness and loss of balance. Negative for focal weakness, headaches, numbness and seizures.   Psychiatric/Behavioral: Negative for depression and suicidal ideas. The patient is not nervous/anxious.    All other systems reviewed and are negative.    Vitals:    08/01/19 1217   BP: 146/67   BP Location: Right arm   Patient Position: Sitting   Pulse: 57   Temp: 99 °F (37.2 °C)   TempSrc: Temporal   SpO2: 99%   Weight: 72.6 kg (160 lb)   Height: 157.5 cm (62\")      Physical Exam:  Gen - NAD, pleasant, cooperative  CV - Regular rate and rhythm, no murmur gallop or rub  Pulm - Lungs clear to auscultation without wheeze or rhonchi   GI - Soft, normoactive bowel sounds, non-tender  Ext - Without edema  Incision - Healing well, no evidence of incisional dehiscence or cellulitis  Neuro - CN II - XII grossly intact, tongue midline, voice normal    Assessment/Plan:  Patient is a 89-year-old  female with history of acute ischemic right lower extremity status post right femoral cutdown with angioma and stenting of the right iliac artery as well as thrombectomy performed 7/5/2019 who presents to office for postop evaluation.  The patient's incision is healing extremely well and she is able to ambulate without difficulty and denies any lower extremity pain or numbness.  The patient is scheduled to follow-up with her primary care provider, Dr. Akins, next week.  I would like for the patient to follow-up in 1 year with our office for continued evaluation of her lower extremities.  If she has any questions, concerns or acutely worsening symptoms she may call our office or present to the nearest emergency department " immediately.    Patient Active Problem List   Diagnosis   • Acute ischemic leg, s/p thrombectomy (Dr. Dillon, 7/5/19)   • Renal insufficiency, acute versus chronic   • Chronic Tachycardia   • Anemia   • Arterial occlusion, lower extremity (CMS/HCC)   • Aspiration pneumonia (CMS/HCC)

## 2019-08-10 PROBLEM — R82.81 PYURIA: Status: ACTIVE | Noted: 2019-01-01

## 2019-08-10 PROBLEM — Z86.79 HISTORY OF SUPRAVENTRICULAR TACHYCARDIA: Status: ACTIVE | Noted: 2019-01-01

## 2019-08-10 PROBLEM — R20.0 NUMBNESS AND TINGLING IN LEFT HAND: Status: ACTIVE | Noted: 2019-01-01

## 2019-08-10 PROBLEM — N18.30 CKD (CHRONIC KIDNEY DISEASE) STAGE 3, GFR 30-59 ML/MIN (HCC): Status: ACTIVE | Noted: 2019-01-01

## 2019-08-10 PROBLEM — R20.2 NUMBNESS AND TINGLING IN LEFT HAND: Status: ACTIVE | Noted: 2019-01-01

## 2019-08-10 PROBLEM — N17.9 ACUTE RENAL FAILURE (HCC): Status: ACTIVE | Noted: 2019-01-01

## 2019-08-10 PROBLEM — N17.9 AKI (ACUTE KIDNEY INJURY) (HCC): Status: ACTIVE | Noted: 2019-01-01

## 2019-08-10 PROBLEM — N20.0 RENAL STONE: Status: ACTIVE | Noted: 2019-01-01

## 2019-08-10 PROBLEM — R10.9 ABDOMINAL PAIN: Status: ACTIVE | Noted: 2019-01-01

## 2019-08-10 NOTE — H&P
[facility] Medicine Services  HISTORY AND PHYSICAL    Patient Name: Bettye Sargent  : 3/28/1927  MRN: [id number]  Primary Care Physician: Anand Akins MD  Date of admission: 2019      Subjective   Subjective     Chief Complaint:  Abdominal pain    HPI:  Bettye Sargent is a 89 y.o. female with a past medical history significant for anemia and PVD who presents with complaints of lower back pain going on for the past two days. Pain is constant, sharp, and radiates to flank area bilaterally and around to lower abdomen bilaterally. Pain worse with movement. There is associated nausea without vomiting, insomnia and numbness/tingling in left hand. Family members at bedside concerned that she hasn't slept in about 2 days due to pain. She denies associated saddle paresthesias, falls or trauma. No heavy lifting or strain. No associated cough, congestion, fever, chest pain, SOB, or syncope. She was admitted to this facilty 2019 to 2019 with acute ischemic right leg. Was found to have right femoral thrombosis and underwent emergent femoral thrombectomy/embolectomy on 2019 per Dr. Dillon. Now on ASA and Plavix. At this time patient also developed aspiration pneumonia and was successfully treated with Augmentin. No other complaints at this time. Will admit for further evaluation and treatment.    Emergency Department Evaluation; vitals stable. Creatinine 2.86. BUN 40. H/H 9.0 and 31.5. MCV 75.7    Review of Systems   Constitutional: Negative for chills and fever.   HENT: Negative for congestion and trouble swallowing.    Eyes: Negative for photophobia and visual disturbance.   Respiratory: Negative for cough and shortness of breath.    Cardiovascular: Negative for chest pain and leg swelling.   Gastrointestinal: Negative for abdominal pain, diarrhea, nausea and vomiting.   Endocrine: Negative for cold intolerance and heat intolerance.          Otherwise complete ROS reviewed  and is negative except as mentioned in the HPI.    Personal History     Past Medical History:   Diagnosis Date   • Neuropathy    • Peripheral vascular disease (CMS/HCC)    • Shingles    • Tachycardia        Past Surgical History:   Procedure Laterality Date   • FEMORAL THROMBECTOMY/EMBOLECTOMY Right 7/5/2019    Procedure: Right Femoral artery cutdown with Angioplasty and stent of the proximal right common iliac artery;  Surgeon: Candido Dillon MD;  Location: [place] [address];  Service: Cardiothoracic       Family History: family history includes No Known Problems in her mother. Otherwise pertinent FHx was reviewed and unremarkable.     Social History:  reports that she has quit smoking. She has never used smokeless tobacco. She reports that she does not drink alcohol or use drugs.  Social History     Social History Narrative    Lives in [place], [place] at [facility]       Medications:    Available home medication information reviewed.  Medications Prior to Admission   Medication Sig Dispense Refill Last Dose   • atenolol (TENORMIN) 50 MG tablet Take 50 mg by mouth Daily.   Taking   • clopidogrel (PLAVIX) 75 MG tablet Take 1 tablet by mouth Daily. 30 tablet 5 Taking   • hydrochlorothiazide (HYDRODIURIL) 12.5 MG tablet Take 12.5 mg by mouth Daily.      • aspirin 325 MG tablet Take 325 mg by mouth Daily.   Taking   • bimatoprost (LUMIGAN) 0.01 % ophthalmic drops Administer 1 drop to both eyes Every Night.   Taking   • fluticasone (FLONASE) 50 MCG/ACT nasal spray 2 sprays by Each Nare route Daily As Needed for Congestion.   Taking       Allergies   Allergen Reactions   • Sulfa Antibiotics Other (See Comments)     Unknown     • Amoxicillin Rash       Objective   Objective     Vital Signs:   Temp:  [97.7 °F (36.5 °C)-98.7 °F (37.1 °C)] 97.7 °F (36.5 °C)  Heart Rate:  [72-77] 72  Resp:  [14-18] 18  BP: (163-183)/(62-79) 183/66        Physical Exam   Constitutional: Awake, alert  Eyes: PERRLA, sclerae  anicteric, no conjunctival injection  HENT: NCAT, mucous membranes moist  Neck: Supple, no thyromegaly, no lymphadenopathy, trachea midline  Respiratory: Clear to auscultation bilaterally, nonlabored respirations   Cardiovascular: RRR, no murmurs, rubs, or gallops, palpable pedal pulses bilaterally  Gastrointestinal: Positive bowel sounds, soft, nontender, nondistended  Musculoskeletal: No bilateral ankle edema, no clubbing or cyanosis to extremities  Psychiatric: Appropriate affect, cooperative  Neurologic: Oriented x 3, strength symmetric in all extremities, Cranial Nerves grossly intact to confrontation, speech clear  Skin: No rashes      Results Reviewed:  I have personally reviewed current lab, radiology, and data and agree.    Results from last 7 days   Lab Units 08/10/19  0107   WBC 10*3/mm3 9.76   HEMOGLOBIN g/dL 9.0*   HEMATOCRIT % 31.5*   PLATELETS 10*3/mm3 214   INR  1.04     Results from last 7 days   Lab Units 08/10/19  0107   SODIUM mmol/L 142   POTASSIUM mmol/L 4.4   CHLORIDE mmol/L 106   CO2 mmol/L 22.0   BUN mg/dL 40*   CREATININE mg/dL 2.86*   GLUCOSE mg/dL 133*   CALCIUM mg/dL 9.1   ALT (SGPT) U/L 9   AST (SGOT) U/L 15   LACTATE mmol/L 1.8     Estimated Creatinine Clearance: 11.6 mL/min (A) (by C-G formula based on SCr of 2.86 mg/dL (H)).  Brief Urine Lab Results  (Last result in the past 365 days)      Color   Clarity   Blood   Leuk Est   Nitrite   Protein   CREAT   Urine HCG        08/10/19 0059 Yellow Clear Negative Small (1+) Negative 30 mg/dL (1+)             Imaging Results (last 24 hours)     Procedure Component Value Units Date/Time    CT Abdomen Pelvis Without Contrast [[id number]] Collected:  08/10/19 0234     Updated:  08/10/19 0236    Narrative:       CT Abdomen Pelvis WO    INDICATION:   Left flank pain    TECHNIQUE:   CT of the abdomen and pelvis without contrast. Coronal and sagittal reconstructions were obtained.  Radiation dose reduction techniques included automated exposure  control or exposure modulation based on body size. Radiation audit for number of CT and  nuclear cardiology exams performed in the last year: 1.      COMPARISON:   CT abdomen pelvis July 2019    FINDINGS:    Visualized lung bases are unremarkable.    Abdomen: The liver and gallbladder and spleen and pancreas are normal. The aorta is normal in caliber. There is no bowel obstruction, and there is no abdominal or retroperitoneal adenopathy or other mass.    There is no hydronephrosis, there is a small nonobstructing 3 mm right renal upper pole calculus. There is no ureterolithiasis.    Pelvis:  There is extensive colonic diverticulosis but there is no convincing CT evidence of diverticulitis. There is no hernia or bowel obstruction.    There is no acute bony abnormality.      Impression:       Diverticulosis without CT evidence of diverticulitis.    3 mm nonobstructing right renal calculus, no hydronephrosis on either side.    No acute abnormality.      Signer Name: Fransico Carlton MD   Signed: 8/10/2019 2:34 AM   Workstation Name: [facility]    [facility]        Results for orders placed during the hospital encounter of 07/05/19   Adult Transthoracic Echo Complete W/ Cont if Necessary Per Protocol    Narrative · Mild tricuspid valve regurgitation is present.  · Mild mitral valve regurgitation is present  · Aortic valve maximum pressure gradient is 14.3 mmHg.  · Aortic valve mean pressure gradient is 6.8 mmHg.  · Aortic valve sclerosis/mild stenosis  · Normal LV systolic function  · EF=60%          Assessment/Plan   Assessment / Plan     Active Hospital Problems    Diagnosis POA   • **MALAIKA (acute kidney injury) (CMS/[facility]) [N17.9] Unknown   • Abdominal pain [R10.9] Unknown     Priority: High   • Numbness and tingling in left hand [R20.0, R20.2] Yes     Priority: Medium   • Pyuria [N39.0] Unknown     Priority: Medium   • CKD (chronic kidney disease) stage 3, GFR 30-59 ml/min (CMS/[facility]) [N18.3]  Unknown     Baseline cr ~1.3-1.5     • Renal stone [N20.0] Unknown     Right non-obstructing right renal calculus     • History of supraventricular tachycardia [Z86.79] Not Applicable   • Acute ischemic leg, s/p thrombectomy (Dr. Dillon, 7/5/19) [I99.8] Yes   • Anemia [D64.9] Yes         MALAIKA on ckd 3 (baseline cr ~1.3-1.5)  - creatine was 1.27 on 7/12/2019  - CT abdomen does demonstrate left 3mm non obstructing stone. No hydronephrosis  - appears dry. Administered 1L fluids in ED. Continue saline at 75 cc/hr  - avoid nephrotoxins and consider MADDY if not improved    Pyuria,cannot r/o uti  -ctx     Transient Left Flank Pain & lower abdominal pain, currently resolved  -? Passed kidney stone vs (less likely) early pyelonephritis vs ? gastroenteritis  -symptoms don't seem consistent w/ mesenteric ischemia as not related to food, normal lactic, etc    PVD (s/p recent RLE thrombectomy w/ stent 7/5/19  - continue ASA and Plavix    Hx of SVT (seen by cardiology last admission)  -continue b-blocker    Anemia:  - microcytic. Likely iron deficiency  ----------------------------------------------------------------------------  Plan:  -isotonic fluids, follow renal function  -empiric rocephin (? Early pyelo since pyuria), follow culture  -ambulate w/ assitance  -continue dapt, b-blocker  -pt/ot  eval    -home when convinced abdominal/flank pain resolved, renal function normalizing, tolerating po    DVT prophylaxis:  [facility]    CODE STATUS:  DNR  Code Status and Medical Interventions:   Ordered at: 08/10/19 0431     Limited Support to NOT Include:    Intubation     Level Of Support Discussed With:    Patient     Code Status:    No CPR     Medical Interventions (Level of Support Prior to Arrest):    Limited       Admission Status:  I believe this patient meets INPATIENT status due to the need for care which can only be reasonably provided in an hospital setting.  As such, I feel patient’s risk for adverse outcomes and need for care  warrant INPATIENT evaluation and predict the patient’s care encounter to likely last beyond 2 midnights.        Electronically signed by Keo Shay PA-C, 08/10/19, 4:37 AM.      Brief Attending Admission Attestation     I have seen and examined the patient, performing an independent face-to-face diagnostic evaluation with plan of care reviewed and developed with the advanced practice clinician (APC).      Brief Summary Statement:   Bettye Sargent is a 89 y.o. female w/ ckd 3 (baseline cr ~1.3-1.5) recently admitted 7/5 - 7/12 with ischemic right leg and had right lower extremity thrombectomy & stents w/ post-operative pneumonia. Patient presented with left flank and lower abdominal pain w/ a bout of emesis. Symptoms not related to food intake. Ct a/p showed non-obstructing right kidney stone, otherwise no acute process. No leukocytosis. U/a w/ small LE, 21-30 wbc. Creatinine 2.86. Normal lft's, normal lactic acid. By time of my exam on the floor, pain had resolved    Remainder of detailed HPI is as noted above and has been reviewed and/or edited by me for completeness.      Attending Physical Exam:  Alert, looks younger than age, nontoxic appearing, ox4, sleeping comfortably in room upon my arrival on her right side  Ncat, oroph clear  rrr  Lungs clear  abd soft, nontender in all quadrants; no cva tenderness  No cce  No extremity rah  Palpable BLE dp pulses  Face symmetric, speech clear, equal       Brief Assessment/Plan :  See above for further detailed assessment and plan developed with APC which I have reviewed and/or edited for completeness.      Electronically signed by Chase De La Rosa MD, 08/10/19, 6:40 AM.

## 2019-08-10 NOTE — THERAPY EVALUATION
Acute Care - Occupational Therapy Initial Evaluation  [facility]     Patient Name: Bettye Sargent  : 3/28/1927  MRN: [id number]  Today's Date: 8/10/2019  Onset of Illness/Injury or Date of Surgery: 19  Date of Referral to OT: 08/10/19  Referring Physician: Keo Shay PA-C    Admit Date: 2019       ICD-10-CM ICD-9-CM   1. Acute renal failure, unspecified acute renal failure type (CMS/HCC) N17.9 584.9   2. Anemia, unspecified type D64.9 285.9   3. Low back pain, unspecified back pain laterality, unspecified chronicity, with sciatica presence unspecified M54.5 724.2   4. Left hand paresthesia R20.2 782.0   5. Impaired mobility and ADLs Z74.09 799.89     Patient Active Problem List   Diagnosis   • Acute ischemic leg, s/p thrombectomy (Dr. Dillon, 19)   • Renal insufficiency, acute versus chronic   • Chronic Tachycardia   • Anemia   • Arterial occlusion, lower extremity (CMS/HCC)   • Aspiration pneumonia (CMS/HCC)   • Numbness and tingling in left hand   • Abdominal pain   • MALAIKA (acute kidney injury) (CMS/[facility]ly [facility])   • CKD (chronic kidney disease) stage 3, GFR 30-59 ml/min (CMS/Formerly [facility])   • Pyuria   • Renal stone   • History of supraventricular tachycardia     Past Medical History:   Diagnosis Date   • Impaired functional mobility, balance, gait, and endurance    • Neuropathy    • Peripheral vascular disease (CMS/[facility])    • Shingles    • Tachycardia      Past Surgical History:   Procedure Laterality Date   • FEMORAL THROMBECTOMY/EMBOLECTOMY Right 2019    Procedure: Right Femoral artery cutdown with Angioplasty and stent of the proximal right common iliac artery;  Surgeon: Candido Dillon MD;  Location: [place] [address];  Service: Cardiothoracic          OT ASSESSMENT FLOWSHEET (last 12 hours)      Occupational Therapy Evaluation     Row Name 08/10/19 1000                   OT Evaluation Time/Intention    Subjective Information  complains of;fatigue wanting to sleep  -MM        Document Type   evaluation  -MM        Mode of Treatment  occupational therapy  -MM        Patient Effort  poor  -MM        Symptoms Noted During/After Treatment  none  -MM           General Information    Patient Profile Reviewed?  yes  -MM        Onset of Illness/Injury or Date of Surgery  08/19/19  -MM        Referring Physician  Keo Shay PA-C  -MM        Patient Observations  alert;poorly cooperative;agree to therapy  -MM        Patient/Family Observations  No family members present.  -MM        General Observations of Patient  Pt received in right side-lying, external catheter, IV, room air, tele, agreeable to eval after encouragement provided.  -MM        Prior Level of Function  independent:;all household mobility;transfer;w/c or scooter;bed mobility;ADL's;feeding;grooming;dressing;bathing;dependent:;cooking;cleaning;driving poor historian; meals by dtr or in dining helms;   -MM        Equipment Currently Used at Home  walker, rolling;wheelchair;wheelchair, motorized;shower chair;grab bar 4-wheeled wx in room, scooter to dining helms, w/c in communi  -MM        Pertinent History of Current Functional Problem  89yof   -MM        Existing Precautions/Restrictions  fall  -MM        Risks Reviewed  patient:;LOB;increased discomfort  -MM        Benefits Reviewed  patient:;improve function;increase independence;increase strength;increase balance  -MM        Barriers to Rehab  previous functional deficit  -MM           Relationship/Environment    Primary Source of Support/Comfort  child(trisha)  -MM        Lives With  facility resident;other (see comments) [facility]  -           Resource/Environmental Concerns    Current Living Arrangements  independent/assisted living facility  -MM        Resource/Environmental Concerns  none  -MM           Cognitive Assessment/Intervention- PT/OT    Orientation Status (Cognition)  oriented x 3  -MM        Follows Commands (Cognition)  follows one step commands;75-90%  accuracy;physical/tactile prompts required;repetition of directions required;verbal cues/prompting required  -MM        Safety Deficit (Cognitive)  mild deficit;ability to follow commands;insight into deficits/self awareness;safety precautions awareness;safety precautions follow-through/compliance  -MM           Bed Mobility Assessment/Treatment    Bed Mobility Assessment/Treatment  supine-sit;sit-supine;scooting/bridging  -MM        Scooting/Bridging [place] (Bed Mobility)  maximum assist (25% patient effort);2 person assist  -MM        Supine-Sit [place] (Bed Mobility)  minimum assist (75% patient effort)  -MM        Sit-Supine [place] (Bed Mobility)  contact guard  -MM        Assistive Device (Bed Mobility)  bed rails;head of bed elevated  -MM           Functional Mobility    Functional Mobility- Comment  Pt refused attempt  -MM           Transfer Assessment/Treatment    Comment (Transfers)  Pt refused attempt despite encouragement  -MM           ADL Assessment/Intervention    BADL Assessment/Intervention  lower body dressing;grooming  -MM           Lower Body Dressing Assessment/Training    Lower Body Dressing [place] Level  lower body dressing skills;doff;don;socks;contact guard assist  -MM        Lower Body Dressing Position  edge of bed sitting  -MM           Grooming Assessment/Training    [place] Level (Grooming)  hair care, combing/brushing;supervision  -MM        Grooming Position  edge of bed sitting  -MM           General ROM    GENERAL ROM COMMENTS  BUE WNL  -MM           MMT (Manual Muscle Testing)    General MMT Comments  BUE grossly 4/5  -MM           Positioning and Restraints    Pre-Treatment Position  in bed  -MM        Post Treatment Position  bed  -MM        In Bed  notified nsg;supine;call light within reach;encouraged to call for assist;exit alarm on;side rails up x2  -MM           Pain Assessment    Additional Documentation  Pain Scale: Numbers Pre/Post-Treatment  (Group)  -MM           Pain Scale: Numbers Pre/Post-Treatment    Pain Scale: Numbers, Pretreatment  1/10  -MM        Pain Scale: Numbers, Post-Treatment  0/10 - no pain  -MM           Coping    Observed Emotional State  agitated  -MM        Verbalized Emotional State  frustration;other (see comments) frustrated she has been unable to sleep  -MM           Plan of Care Review    Plan of Care Reviewed With  patient  -MM           Clinical Impression (OT)    Date of Referral to OT  08/10/19  -MM        OT Diagnosis  Decreased independence with ADL, tsf and fnxl mobility.  -MM        Patient/Family Goals Statement (OT Eval)  [facility]  -MM        Criteria for Skilled Therapeutic Interventions Met (OT Eval)  yes;treatment indicated  -MM        Rehab Potential (OT Eval)  fair, will monitor progress closely  -MM        Therapy Frequency (OT Eval)  daily  -MM        Care Plan Review (OT)  evaluation/treatment results reviewed;care plan/treatment goals reviewed;risks/benefits reviewed;current/potential barriers reviewed;patient/other agree to care plan  -MM        Anticipated Discharge Disposition (OT)  home with home health;assisted living facility ([facility]) [facility]  -MM           Planned OT Interventions    Planned Therapy Interventions (OT Eval)  activity tolerance training;BADL retraining;functional balance retraining;occupation/activity based interventions;strengthening exercise;transfer/mobility retraining  -MM           OT Goals    Bed Mobility Goal Selection (OT)  bed mobility, OT goal 1  -MM        Transfer Goal Selection (OT)  transfer, OT goal 1  -MM        Dressing Goal Selection (OT)  dressing, OT goal 1  -MM        Toileting Goal Selection (OT)  toileting, OT goal 1  -MM           Bed Mobility Goal 1 (OT)    Activity/Assistive Device (Bed Mobility Goal 1, OT)  bed mobility activities, all  -MM        [place] Level/Cues Needed (Bed Mobility Goal 1, OT)  independent  -MM        Time Frame  (Bed Mobility Goal 1, OT)  by discharge  -MM           Transfer Goal 1 (OT)    Activity/Assistive Device (Transfer Goal 1, OT)  sit-to-stand/stand-to-sit;bed-to-chair/chair-to-bed;walker, rolling  -MM        [place] Level/Cues Needed (Transfer Goal 1, OT)  contact guard assist  -MM        Time Frame (Transfer Goal 1, OT)  by discharge  -MM           Dressing Goal 1 (OT)    Activity/Assistive Device (Dressing Goal 1, OT)  lower body dressing  -MM        [place]/Cues Needed (Dressing Goal 1, OT)  minimum assist (75% or more patient effort)  -MM        Time Frame (Dressing Goal 1, OT)  by discharge  -MM           Toileting Goal 1 (OT)    Activity/Device (Toileting Goal 1, OT)  toileting skills, all  -MM        [place] Level/Cues Needed (Toileting Goal 1, OT)  supervision required  -MM        Time Frame (Toileting Goal 1, OT)  by discharge  -MM           Living Environment    Home Accessibility  wheelchair accessible  -MM          User Key  (r) = Recorded By, (t) = Taken By, (c) = Cosigned By    Initials Name Effective Dates    Rosy Monsivais, OT 06/22/15 -          Occupational Therapy Education     Title: PT OT SLP Therapies (In Progress)     Topic: Occupational Therapy (In Progress)     Point: ADL training (In Progress)     Description: Instruct learner(s) on proper safety adaptation and remediation techniques during self care or transfers.   Instruct in proper use of assistive devices.    Learning Progress Summary           Patient Acceptance, E,D, NR by BONNIE at 8/10/2019 10:00 AM    Comment:  Educated on role of OT, process of IE, plan of care, benefits of activity, precautions and improved mechanics for ADL and bed mobility.                   Point: Precautions (In Progress)     Description: Instruct learner(s) on prescribed precautions during self-care and functional transfers.    Learning Progress Summary           Patient Acceptance, E,D, NR by BONNIE at 8/10/2019 10:00 AM    Comment:   Educated on role of OT, process of IE, plan of care, benefits of activity, precautions and improved mechanics for ADL and bed mobility.                   Point: Body mechanics (In Progress)     Description: Instruct learner(s) on proper positioning and spine alignment during self-care, functional mobility activities and/or exercises.    Learning Progress Summary           Patient Acceptance, E,D, NR by BONNIE at 8/10/2019 10:00 AM    Comment:  Educated on role of OT, process of IE, plan of care, benefits of activity, precautions and improved mechanics for ADL and bed mobility.                               User Key     Initials Effective Dates Name Provider Type Discipline    BONNIE 06/22/15 -  Rosy Kemp, OT Occupational Therapist OT                  OT Recommendation and Plan  Outcome Summary/Treatment Plan (OT)  Anticipated Discharge Disposition (OT): home with home health, assisted living facility ([facility])(return to [facility] with [facility])  Planned Therapy Interventions (OT Eval): activity tolerance training, BADL retraining, functional balance retraining, occupation/activity based interventions, strengthening exercise, transfer/mobility retraining  Therapy Frequency (OT Eval): daily  Plan of Care Review  Plan of Care Reviewed With: patient  Plan of Care Reviewed With: patient  Outcome Summary: OT eval complete. Pt reports fatigue and declined tsf/mobility despite multiple efforts to encourage. Presents with decreased strength and ADL performance, required CGA- max A x2 for bed mobility. Would benefit from OT services to address deficits and progress toward PLOF. Recommend d/c to residential with HHOT.    Outcome Measures     Row Name 08/10/19 1000             How much help from another is currently needed...    Putting on and taking off regular lower body clothing?  2  -MM      Bathing (including washing, rinsing, and drying)  3  -MM      Toileting (which includes using toilet bed pan or urinal)  3  -MM      Putting on and  taking off regular upper body clothing  3  -MM      Taking care of personal grooming (such as brushing teeth)  4  -MM      Eating meals  4  -MM      AM-PAC 6 Clicks Score (OT)  19  -MM         Functional Assessment    Outcome Measure Options  AM-PAC 6 Clicks Daily Activity (OT)  -MM        User Key  (r) = Recorded By, (t) = Taken By, (c) = Cosigned By    Initials Name Provider Type    Rosy Monsivais OT Occupational Therapist          Time Calculation:   Time Calculation- OT     Row Name 08/10/19 1000             Time Calculation- OT    OT Start Time  1000  -MM      OT Received On  08/10/19  -MM      OT Goal Re-Cert Due Date  08/20/19  -MM        User Key  (r) = Recorded By, (t) = Taken By, (c) = Cosigned By    Initials Name Provider Type    Rosy Monsivais OT Occupational Therapist        Therapy Charges for Today     Code Description Service Date Service Provider Modifiers Qty    46098310871 HC OT EVAL MOD COMPLEXITY 3 8/10/2019 Rosy Kemp OT GO 1               Rosy Kemp OT  8/10/2019

## 2019-08-10 NOTE — ED PROVIDER NOTES
Subjective   89-year-old female presents for evaluation of atraumatic left-sided low back pain and flank pain.  Of note, the patient was recently admitted to our facility in early July for acute arterial occlusion of her right lower extremity.  Her leg was salvaged by Dr. Dillon, and she did well postoperatively.  She is now back home and walking.  She had been doing well until yesterday when she began experiencing intermittent episodes of pain in her left flank/low back region.  The pain comes on in short bursts and seems to come and go.  Tonight, in addition to painful episode she also began experiencing nausea and vomiting as well as numbness and tingling in her left hand.  No urinary symptoms.  No fevers.  No bowel or bladder incontinence or retention.  She denies any paresthesias of her lower extremities.  No fall or injury.  No similar episodes before in the past.  Her family was concerned, especially given her recent history, and they subsequently brought her to the ED for evaluation.  The patient denies any active pain at this time.        History provided by:  Patient and relative  Back Pain   Pain location: left flank.  Radiates to:  Does not radiate  Pain severity:  Moderate  Pain is:  Worse during the night  Onset quality:  Sudden  Duration:  1 day  Timing:  Constant  Progression:  Waxing and waning  Chronicity:  New  Context: not falling and not recent injury    Associated symptoms: numbness (left fingers)    Associated symptoms: no dysuria and no weakness    Risk factors: not obese        Review of Systems   Constitutional: Positive for fatigue.   Gastrointestinal: Negative for constipation and diarrhea.   Genitourinary: Negative for difficulty urinating, dysuria, frequency and urgency.   Musculoskeletal: Positive for back pain. Negative for neck pain.   Neurological: Positive for numbness (left fingers). Negative for weakness.        Negative for numbness in the left leg.   All other systems  reviewed and are negative.      Past Medical History:   Diagnosis Date   • Neuropathy    • Peripheral vascular disease (CMS/HCC)    • Shingles    • Tachycardia        Allergies   Allergen Reactions   • Sulfa Antibiotics Other (See Comments)     Unknown     • Amoxicillin Rash       Past Surgical History:   Procedure Laterality Date   • FEMORAL THROMBECTOMY/EMBOLECTOMY Right 7/5/2019    Procedure: Right Femoral artery cutdown with Angioplasty and stent of the proximal right common iliac artery;  Surgeon: Candido Dillon MD;  Location: [place] [address];  Service: Cardiothoracic       Family History   Problem Relation Age of Onset   • No Known Problems Mother        Social History     Socioeconomic History   • Marital status:      Spouse name: Not on file   • Number of children: 5   • Years of education: Not on file   • Highest education level: Not on file   Occupational History   • Occupation: Homemaker   Tobacco Use   • Smoking status: Former Smoker   • Smokeless tobacco: Never Used   Substance and Sexual Activity   • Alcohol use: No     Frequency: Never   • Drug use: No   Social History Narrative    Lives in [place], [place] at assisted living facility         Objective   Physical Exam   Constitutional: She is oriented to person, place, and time. She appears well-developed and well-nourished. No distress.   Pleasant, well-appearing elderly female in no acute distress   HENT:   Head: Normocephalic and atraumatic.   Mouth/Throat: Oropharynx is clear and moist.   Neck: Normal range of motion. Neck supple. No JVD present.   Cardiovascular: Normal rate, regular rhythm, normal heart sounds and intact distal pulses. Exam reveals no gallop and no friction rub.   No murmur heard.  Pulmonary/Chest: Effort normal and breath sounds normal. No respiratory distress. She has no wheezes. She has no rales.   Abdominal: Soft. Bowel sounds are normal. She exhibits no distension and no mass. There is no tenderness. There is  no rebound and no guarding.   Genitourinary:   Genitourinary Comments: Mild left-sided paraspinal tenderness noted to lumbar region, no midline tenderness noted   Musculoskeletal: Normal range of motion. She exhibits no edema.   Neurological: She is alert and oriented to person, place, and time.   Normal gait, neurovascularly intact distally in bilateral lower and upper extremities with bounding distal pulses and normal sensation, 5 out of 5 strength in all fours, no ataxia or dysmetria noted, clear and fluent speech, no saddle anesthesia   Skin: Skin is warm and dry. No rash noted. She is not diaphoretic. No erythema.   Psychiatric: She has a normal mood and affect. Judgment and thought content normal.   Nursing note and vitals reviewed.      Procedures         ED Course  ED Course as of Aug 10 0658   Sat Aug 10, 2019   0655 89-year-old female presents for evaluation of atraumatic left-sided low back and flank pain intermittently since yesterday.  Of note, the patient has a complicated recent past medical history and was admitted to our facility in early July for acute arterial occlusion.  Her leg was salvaged by Dr. Dillon and she has done well postoperatively until yesterday when she began experiencing intermittent episodes of left flank/low back pain that have bothered her since that time.  Additionally, she began experiencing paresthesias of her left hand today and was subsequently brought to the ED for evaluation.  On arrival, patient nontoxic-appearing.  No midline lumbar tenderness.  No saddle anesthesia noted.  Normal gait.  The patient is neurovascularly intact in both lower extremities with bounding distal pulses and normal sensation.  Her left upper extremity is neurovascularly intact as well.  No focal neurological deficits noted.  Nonsurgical abdomen.  Labs revealed acute on chronic renal failure.  IV fluids given.  CT of abdomen/pelvis negative for acute emergent process.  While in the emergency  "department the patient began exhibiting nausea and vomiting.  I attempted to obtain further advanced imaging to rule out emergent central process, but unfortunately, the patient was unable to tolerate an MRI secondary to severe anxiety.  Given her renal failure and persistent symptoms, I feel that she warrants inpatient treatment at this time for further supportive care.  I discussed her case with Dr. De La Rosa, and the patient will be admitted under his care.  She is hemodynamically stable at this time and aware/agreeable with the plan.  [DD]      ED Course User Index  [DD] Doron Fulton MD       No results found for this or any previous visit (from the past 24 hour(s)).  Note: In addition to lab results from this visit, the labs listed above may include labs taken at another facility or during a different encounter within the last 24 hours. Please correlate lab times with ED admission and discharge times for further clarification of the services performed during this visit.    CT Abdomen Pelvis Without Contrast    (Results Pending)   CT Angiogram Abdomen Pelvis With & Without Contrast    (Results Pending)   MRI Brain Without Contrast    (Results Pending)   MRI Lumbar Spine With & Without Contrast    (Results Pending)     Vitals:    08/09/19 2308 08/09/19 2330 08/10/19 0000   BP: 178/68 163/79 171/74   BP Location: Right arm     Patient Position: Lying     Pulse: 77     Resp: 14     Temp: 98.7 °F (37.1 °C)     TempSrc: Oral     SpO2: 94% (!) 89% 93%   Weight: 72.6 kg (160 lb)     Height: 157.5 cm (62\")       Medications   sodium chloride 0.9 % bolus 500 mL (not administered)   sodium chloride 0.9 % flush 10 mL (not administered)     ECG/EMG Results (last 24 hours)     ** No results found for the last 24 hours. **        No orders to display                 Recent Results (from the past 24 hour(s))   Urinalysis With Microscopic If Indicated (No Culture) - Urine, Clean Catch    Collection Time: 08/10/19 12:59 AM "   Result Value Ref Range    Color, UA Yellow Yellow, Straw    Appearance, UA Clear Clear    pH, UA <=5.0 5.0 - 8.0    Specific Gravity, UA 1.015 1.001 - 1.030    Glucose, UA Negative Negative    Ketones, UA Negative Negative    Bilirubin, UA Negative Negative    Blood, UA Negative Negative    Protein, UA 30 mg/dL (1+) (A) Negative    Leuk Esterase, UA Small (1+) (A) Negative    Nitrite, UA Negative Negative    Urobilinogen, UA 0.2 E.U./dL 0.2 - 1.0 E.U./dL   Urinalysis, Microscopic Only - Urine, Clean Catch    Collection Time: 08/10/19 12:59 AM   Result Value Ref Range    RBC, UA 0-2 None Seen, 0-2 /HPF    WBC, UA 21-30 (A) None Seen, 0-2 /HPF    Bacteria, UA None Seen None Seen, Trace /HPF    Squamous Epithelial Cells, UA 0-2 None Seen, 0-2 /HPF    Hyaline Casts, UA 0-6 0 - 6 /LPF    Methodology Automated Microscopy    Comprehensive Metabolic Panel    Collection Time: 08/10/19  1:07 AM   Result Value Ref Range    Glucose 133 (H) 65 - 99 mg/dL    BUN 40 (H) 8 - 23 mg/dL    Creatinine 2.86 (H) 0.57 - 1.00 mg/dL    Sodium 142 136 - 145 mmol/L    Potassium 4.4 3.5 - 5.2 mmol/L    Chloride 106 98 - 107 mmol/L    CO2 22.0 22.0 - 29.0 mmol/L    Calcium 9.1 8.2 - 9.6 mg/dL    Total Protein 6.2 6.0 - 8.5 g/dL    Albumin 3.50 3.50 - 5.20 g/dL    ALT (SGPT) 9 1 - 33 U/L    AST (SGOT) 15 1 - 32 U/L    Alkaline Phosphatase 74 39 - 117 U/L    Total Bilirubin 0.4 0.2 - 1.2 mg/dL    eGFR Non African Amer 15 (L) >60 mL/min/1.73    Globulin 2.7 gm/dL    A/G Ratio 1.3 g/dL    BUN/Creatinine Ratio 14.0 7.0 - 25.0    Anion Gap 14.0 5.0 - 15.0 mmol/L   Protime-INR    Collection Time: 08/10/19  1:07 AM   Result Value Ref Range    Protime 13.1 11.2 - 14.3 Seconds    INR 1.04 0.85 - 1.16   Lactic Acid, Plasma    Collection Time: 08/10/19  1:07 AM   Result Value Ref Range    Lactate 1.8 0.5 - 2.0 mmol/L   CBC Auto Differential    Collection Time: 08/10/19  1:07 AM   Result Value Ref Range    WBC 9.76 3.40 - 10.80 10*3/mm3    RBC 4.16 3.77 -  5.28 10*6/mm3    Hemoglobin 9.0 (L) 12.0 - 15.9 g/dL    Hematocrit 31.5 (L) 34.0 - 46.6 %    MCV 75.7 (L) 79.0 - 97.0 fL    MCH 21.6 (L) 26.6 - 33.0 pg    MCHC 28.6 (L) 31.5 - 35.7 g/dL    RDW 20.4 (H) 12.3 - 15.4 %    RDW-SD 55.4 (H) 37.0 - 54.0 fl    MPV 11.5 6.0 - 12.0 fL    Platelets 214 140 - 450 10*3/mm3    Neutrophil % 67.4 42.7 - 76.0 %    Lymphocyte % 13.2 (L) 19.6 - 45.3 %    Monocyte % 8.5 5.0 - 12.0 %    Eosinophil % 10.3 (H) 0.3 - 6.2 %    Basophil % 0.3 0.0 - 1.5 %    Immature Grans % 0.3 0.0 - 0.5 %    Neutrophils, Absolute 6.57 1.70 - 7.00 10*3/mm3    Lymphocytes, Absolute 1.29 0.70 - 3.10 10*3/mm3    Monocytes, Absolute 0.83 0.10 - 0.90 10*3/mm3    Eosinophils, Absolute 1.01 (H) 0.00 - 0.40 10*3/mm3    Basophils, Absolute 0.03 0.00 - 0.20 10*3/mm3    Immature Grans, Absolute 0.03 0.00 - 0.05 10*3/mm3    nRBC 0.0 0.0 - 0.2 /100 WBC     Note: In addition to lab results from this visit, the labs listed above may include labs taken at another facility or during a different encounter within the last 24 hours. Please correlate lab times with ED admission and discharge times for further clarification of the services performed during this visit.    CT Abdomen Pelvis Without Contrast   Final Result   Diverticulosis without CT evidence of diverticulitis.      3 mm nonobstructing right renal calculus, no hydronephrosis on either side.      No acute abnormality.         Signer Name: Fransico Carlton MD    Signed: 8/10/2019 2:34 AM    Workstation Name: [id number]     [facility]        Vitals:    08/09/19 2330 08/10/19 0000 08/10/19 0400 08/10/19 0505   BP: 163/79 171/74 170/62 (!) 183/66   BP Location:    Right arm   Patient Position:    Lying   Pulse:    72   Resp:    18   Temp:    97.7 °F (36.5 °C)   TempSrc:    Oral   SpO2:  93% 95%    Weight:    70.9 kg (156 lb 3.2 oz)   Height:         Medications   sodium chloride 0.9 % flush 10 mL (not administered)   LORazepam (ATIVAN)  injection 1 mg ( Intravenous Not Given 8/10/19 0302)   amLODIPine (NORVASC) tablet 5 mg (not administered)   aspirin tablet 325 mg (not administered)   clopidogrel (PLAVIX) tablet 75 mg (not administered)   atenolol (TENORMIN) tablet 50 mg (not administered)   sodium chloride 0.9 % flush 3 mL (not administered)   sodium chloride 0.9 % flush 3-10 mL (not administered)   heparin (porcine) 5000 UNIT/ML injection 5,000 Units (not administered)   sodium chloride 0.9 % infusion (75 mL/hr Intravenous New Bag 8/10/19 0633)   acetaminophen (TYLENOL) tablet 650 mg (not administered)   melatonin tablet 5 mg (not administered)   ondansetron (ZOFRAN) injection 4 mg (not administered)   cefTRIAXone (ROCEPHIN) 1 g/100 mL 0.9% NS (MBP) (not administered)   famotidine (PEPCID) tablet 20 mg (not administered)   sodium chloride 0.9 % bolus 500 mL (0 mL Intravenous Stopped 8/10/19 0428)   ondansetron (ZOFRAN) injection 4 mg (4 mg Intravenous Given 8/10/19 0247)   sodium chloride 0.9 % bolus 500 mL (0 mL Intravenous Stopped 8/10/19 0428)     ECG/EMG Results (last 24 hours)     ** No results found for the last 24 hours. **        No orders to display           MDM    Final diagnoses:   Acute renal failure, unspecified acute renal failure type (CMS/[facility])   Anemia, unspecified type   Low back pain, unspecified back pain laterality, unspecified chronicity, with sciatica presence unspecified   Left hand paresthesia       Documentation assistance provided by heaven Whitley.  Information recorded by the mertibsara was done at my direction and has been verified and validated by me.     Dary Whitley  08/10/19 0033       Doron Fulton MD  08/10/19 0605

## 2019-08-10 NOTE — PLAN OF CARE
Problem: Hospitalized Acutely Ill Older (Adult)  Goal: Signs and Symptoms of Listed Potential Problems Will be Absent, Minimized or Managed (Hospitalized Acutely Ill Older)  Outcome: Ongoing (interventions implemented as appropriate)   08/10/19 9182   Goal/Outcome Evaluation   Problems Assessed (Acutely Ill Older Adult) all   Problems Present (Acute Older Adult) fluid imbalance;situational response;sleep disturbance

## 2019-08-10 NOTE — PROGRESS NOTES
Patient admitted this am with MALAIKA possible recently passed nephrolithiasis. Doing better this am. Continue IVF, IV abx, pain control. Hold hctz indefinitely. Repeat BMP in am. If better likely d/c.

## 2019-08-10 NOTE — PLAN OF CARE
Problem: Patient Care Overview  Goal: Plan of Care Review  Outcome: Ongoing (interventions implemented as appropriate)   08/10/19 1511   Coping/Psychosocial   Plan of Care Reviewed With patient;family   OTHER   Outcome Summary Pt has had no complaints of pain, very pleasant, VSS, one assist to bathroom, Dr. Zamudio got all her meds from home straightened out for her. WSill continue to,monitor.   Plan of Care Review   Progress no change

## 2019-08-10 NOTE — PLAN OF CARE
Problem: Patient Care Overview  Goal: Plan of Care Review  Outcome: Ongoing (interventions implemented as appropriate)   08/10/19 3368   Coping/Psychosocial   Plan of Care Reviewed With patient   OTHER   Outcome Summary PT initial eval completed. Pt reports fatigue and states that she has not slept in 2 days, this limits endurance with PT eval. She completes bed mobility with CGA-max A x 2, she declines transfers and gait training d/t fatigue. Cont with IPPT to improve endurance and independence with mobility, recommend HHPT upon dc.

## 2019-08-10 NOTE — THERAPY EVALUATION
Patient Name: Bettye Sargent  : 3/28/1927    MRN: [id number]                              Today's Date: 8/10/2019       Admit Date: 2019    Visit Dx:     ICD-10-CM ICD-9-CM   1. Acute renal failure, unspecified acute renal failure type (CMS/HCC) N17.9 584.9   2. Anemia, unspecified type D64.9 285.9   3. Low back pain, unspecified back pain laterality, unspecified chronicity, with sciatica presence unspecified M54.5 724.2   4. Left hand paresthesia R20.2 782.0   5. Impaired mobility and ADLs Z74.09 799.89     Patient Active Problem List   Diagnosis   • Acute ischemic leg, s/p thrombectomy (Dr. Dillon, 19)   • Renal insufficiency, acute versus chronic   • Chronic Tachycardia   • Anemia   • Arterial occlusion, lower extremity (CMS/[facility])   • Aspiration pneumonia (CMS/[facility])   • Numbness and tingling in left hand   • Abdominal pain   • MALAIKA (acute kidney injury) (CMS/[facility])   • CKD (chronic kidney disease) stage 3, GFR 30-59 ml/min (CMS/[facility])   • Pyuria   • Renal stone   • History of supraventricular tachycardia     Past Medical History:   Diagnosis Date   • Impaired functional mobility, balance, gait, and endurance    • Neuropathy    • Peripheral vascular disease (CMS/[facility])    • Shingles    • Tachycardia      Past Surgical History:   Procedure Laterality Date   • FEMORAL THROMBECTOMY/EMBOLECTOMY Right 2019    Procedure: Right Femoral artery cutdown with Angioplasty and stent of the proximal right common iliac artery;  Surgeon: Candido Dillon MD;  Location: [place] [id number];  Service: Cardiothoracic     General Information     Row Name 08/10/19 1134 08/10/19 1000       PT Evaluation Time/Intention    Subjective Information  --  complains of;fatigue wanting to sleep  -MM    Document Type  evaluation  -SR  evaluation  -MM    Mode of Treatment  physical therapy  -SR  occupational therapy  -MM    Patient Effort  --  poor  -MM    Symptoms Noted During/After Treatment  --  none  -MM    Row Name 08/10/19 1134  08/10/19 1020       General Information    Patient Profile Reviewed?  yes  -SR  --    Prior Level of Function  independent:;ADL's;all household mobility;community mobility  -SR  --    Equipment Currently Used at Home  --  walker, rolling;wheelchair;wheelchair, motorized rw within apt, motorized wc outside apt  -SR    Existing Precautions/Restrictions  fall  -SR  --    Barriers to Rehab  none identified  -SR  --    Row Name 08/10/19 1000          General Information    Patient Profile Reviewed?  yes  -MM     Onset of Illness/Injury or Date of Surgery  08/19/19  -MM     Referring Physician  Keo Shay PA-C  -MM     Patient Observations  alert;poorly cooperative;agree to therapy  -MM     Patient/Family Observations  No family members present.  -MM     General Observations of Patient  Pt received in right side-lying, external catheter, IV, room air, tele, agreeable to eval after encouragement provided.  -MM     Prior Level of Function  independent:;all household mobility;transfer;w/c or scooter;bed mobility;ADL's;feeding;grooming;dressing;bathing;dependent:;cooking;cleaning;driving poor historian; meals by dtr or in dining helms;   -MM     Equipment Currently Used at Home  walker, rolling;wheelchair;wheelchair, motorized;shower chair;grab bar 4-wheeled wx in room, scooter to dining helms, w/c in communi  -MM     Pertinent History of Current Functional Problem  89yof   -MM     Existing Precautions/Restrictions  fall  -MM     Risks Reviewed  patient:;LOB;increased discomfort  -MM     Benefits Reviewed  patient:;improve function;increase independence;increase strength;increase balance  -MM     Barriers to Rehab  previous functional deficit  -MM     Row Name 08/10/19 1134 08/10/19 1000       Relationship/Environment    Primary Source of Support/Comfort  --  child(trisha)  -MM    Lives With  facility resident  -SR  facility resident;other (see comments) [facility]  -MM    Row Name 08/10/19 1134 08/10/19  1000       Resource/Environmental Concerns    Current Living Arrangements  independent/assisted living facility  -SR  independent/assisted living facility  -MM    Resource/Environmental Concerns  --  none  -MM    Row Name 08/10/19 0500          Resource/Environmental Concerns    Current Living Arrangements  independent/assisted living facility  -CH     Resource/Environmental Concerns  none  -CH     Row Name 08/10/19 1134 08/10/19 1000       Cognitive Assessment/Intervention- PT/OT    Orientation Status (Cognition)  oriented x 4  -SR  oriented x 3  -MM    Follows Commands (Cognition)  --  follows one step commands;75-90% accuracy;physical/tactile prompts required;repetition of directions required;verbal cues/prompting required  -MM    Safety Deficit (Cognitive)  --  mild deficit;ability to follow commands;insight into deficits/self awareness;safety precautions awareness;safety precautions follow-through/compliance  -MM    Row Name 08/10/19 1134          Safety Issues, Functional Mobility    Safety Issues Affecting Function (Mobility)  judgment  -SR     Impairments Affecting Function (Mobility)  balance;endurance/activity tolerance  -SR       User Key  (r) = Recorded By, (t) = Taken By, (c) = Cosigned By    Initials Name Provider Type    MM Rosy Kemp, OT Occupational Therapist    SR Susan Markham, PT Physical Therapist    CH Liz Whatley RN Registered Nurse        Mobility     Row Name 08/10/19 1136 08/10/19 1000       Bed Mobility Assessment/Treatment    Bed Mobility Assessment/Treatment  supine-sit;sit-supine;scooting/bridging  -SR  supine-sit;sit-supine;scooting/bridging  -MM    Scooting/Bridging [place] (Bed Mobility)  maximum assist (25% patient effort);2 person assist pt states that she is too tired to complete  -SR  conditional independence;other (see comments) increased time/effort, rest breaks  -MM    Supine-Sit [place] (Bed Mobility)  supervision  -SR  minimum assist (75%  patient effort)  -MM    Sit-Supine [place] (Bed Mobility)  supervision  -SR  supervision  -MM    Assistive Device (Bed Mobility)  bed rails;head of bed elevated;draw sheet  -SR  bed rails;head of bed elevated  -MM    Row Name 08/10/19 1136 08/10/19 1000       Transfer Assessment/Treatment    Comment (Transfers)  pt declines to attempt d/t fatigue  -SR  Pt refused attempt despite encouragement  -MM    Row Name 08/10/19 1136          Gait/Stairs Assessment/Training    [place] Level (Gait)  not tested pt declines to attempt d/t fatigue  -SR       User Key  (r) = Recorded By, (t) = Taken By, (c) = Cosigned By    Initials Name Provider Type    Rosy Monsivais, OT Occupational Therapist    Susan Alejandro, PT Physical Therapist        Obj/Interventions     Row Name 08/10/19 1137 08/10/19 1000       General ROM    GENERAL ROM COMMENTS  BLE AROM WNL  -SR  BUE WNL  -MM    Row Name 08/10/19 1137 08/10/19 1000       MMT (Manual Muscle Testing)    General MMT Comments  BLE grossly 4/5  -SR  BUE grossly 4/5  -MM    Row Name 08/10/19 1137          Static Sitting Balance    Level of [place] (Unsupported Sitting, Static Balance)  independent  -SR     Sitting Position (Unsupported Sitting, Static Balance)  sitting on edge of bed  -SR     Time Able to Maintain Position (Unsupported Sitting, Static Balance)  more than 5 minutes  -SR     Row Name 08/10/19 1137          Sensory Assessment/Intervention    Sensory General Assessment  -- pt reports min numbness in L hand  -SR       User Key  (r) = Recorded By, (t) = Taken By, (c) = Cosigned By    Initials Name Provider Type    Rosy Monsivais, OT Occupational Therapist    Susan Alejandro, PT Physical Therapist        Goals/Plan     Row Name 08/10/19 1141          Bed Mobility Goal 1 (PT)    Activity/Assistive Device (Bed Mobility Goal 1, PT)  bed mobility activities, all  -SR     [place] Level/Cues Needed (Bed Mobility Goal 1, PT)   independent  -SR     Time Frame (Bed Mobility Goal 1, PT)  short term goal (STG);5 days  -SR     Progress/Outcomes (Bed Mobility Goal 1, PT)  goal ongoing  -SR     Row Name 08/10/19 1141          Transfer Goal 1 (PT)    Activity/Assistive Device (Transfer Goal 1, PT)  sit-to-stand/stand-to-sit;bed-to-chair/chair-to-bed;walker, rolling  -SR     [place] Level/Cues Needed (Transfer Goal 1, PT)  conditional independence  -SR     Time Frame (Transfer Goal 1, PT)  long term goal (LTG);10 days  -SR     Progress/Outcome (Transfer Goal 1, PT)  goal ongoing  -SR     Row Name 08/10/19 1141          Gait Training Goal 1 (PT)    Activity/Assistive Device (Gait Training Goal 1, PT)  gait (walking locomotion);walker, rolling  -SR     [place] Level (Gait Training Goal 1, PT)  conditional independence  -SR     Distance (Gait Goal 1, PT)  50  -SR     Time Frame (Gait Training Goal 1, PT)  long term goal (LTG);10 days  -SR     Progress/Outcome (Gait Training Goal 1, PT)  goal ongoing  -SR       User Key  (r) = Recorded By, (t) = Taken By, (c) = Cosigned By    Initials Name Provider Type    SR Susan Markham, PT Physical Therapist        Clinical Impression     Row Name 08/10/19 1139          Pain Assessment    Additional Documentation  Pain Scale: Numbers Pre/Post-Treatment (Group)  -SR     Row Name 08/10/19 1139 08/10/19 1000       Pain Scale: Numbers Pre/Post-Treatment    Pain Scale: Numbers, Pretreatment  --  1/10  -MM    Pain Scale: Numbers, Post-Treatment  --  0/10 - no pain  -MM    Pain Intervention(s)  Ambulation/increased activity  -SR  --    [facility] Name 08/10/19 1139          Pain Scale: FACES Pre/Post-Treatment    Pain: FACES Scale, Pretreatment  0-->no hurt  -SR     Pain: FACES Scale, Post-Treatment  0-->no hurt  -SR     [facility] Name 08/10/19 1147 08/10/19 1139       Plan of Care Review    Plan of Care Reviewed With  patient  -SR  patient  -SR    [facility] Name 08/10/19 1000 08/10/19 0800       Plan of Care Review    Plan of  Care Reviewed With  patient  -MM  patient  (Pended)   -PS    Row Name 08/10/19 0505          Plan of Care Review    Plan of Care Reviewed With  patient  -CH     Row Name 08/10/19 1139          Physical Therapy Clinical Impression    Patient/Family Goals Statement (PT Clinical Impression)  to go back home  -SR     Criteria for Skilled Interventions Met (PT Clinical Impression)  yes;treatment indicated  -SR     Rehab Potential (PT Clinical Summary)  good, to achieve stated therapy goals  -SR     Row Name 08/10/19 1139          Vital Signs    Pre Systolic BP Rehab  -- VSS, RN cleared for tx  -SR     Row Name 08/10/19 1139 08/10/19 1000       Positioning and Restraints    Pre-Treatment Position  in bed  -SR  in bed  -MM    Post Treatment Position  bed  -SR  bed  -MM    In Bed  notified nsg;supine;call light within reach;encouraged to call for assist;exit alarm on  -SR  notified nsg;supine;call light within reach;encouraged to call for assist;exit alarm on;side rails up x2  -MM      User Key  (r) = Recorded By, (t) = Taken By, (c) = Cosigned By    Initials Name Provider Type    MM Rosy Kemp, OT Occupational Therapist    SR Susan Markham, PT Physical Therapist    PS Iris Nelson, RN Registered Nurse    CH Liz Whatley, RN Registered Nurse        Outcome Measures     Row Name 08/10/19 1151          How much help from another person do you currently need...    Turning from your back to your side while in flat bed without using bedrails?  4  -SR     Moving from lying on back to sitting on the side of a flat bed without bedrails?  3  -SR     Moving to and from a bed to a chair (including a wheelchair)?  3  -SR     Standing up from a chair using your arms (e.g., wheelchair, bedside chair)?  3  -SR     Climbing 3-5 steps with a railing?  2  -SR     To walk in hospital room?  3  -SR     AM-PAC 6 Clicks Score (PT)  18  -SR     Row Name 08/10/19 1151          Functional Assessment    Outcome Measure  Options  AM-PAC 6 Clicks Basic Mobility (PT)  -SR       User Key  (r) = Recorded By, (t) = Taken By, (c) = Cosigned By    Initials Name Provider Type    SR Susan Markham, PT Physical Therapist        Physical Therapy Education     Title: PT OT SLP Therapies (In Progress)     Topic: Physical Therapy (In Progress)     Point: Mobility training (In Progress)     Learning Progress Summary           Patient Acceptance, E,TB, NR by SR at 8/10/2019 11:47 AM                   Point: Body mechanics (In Progress)     Learning Progress Summary           Patient Acceptance, E,TB, NR by SR at 8/10/2019 11:47 AM                   Point: Precautions (In Progress)     Learning Progress Summary           Patient Acceptance, E,TB, NR by SR at 8/10/2019 11:47 AM                               User Key     Initials Effective Dates Name Provider Type Discipline     06/19/15 -  Susan Markham PT Physical Therapist PT              PT Recommendation and Plan  Planned Therapy Interventions (PT Eval): balance training, bed mobility training, gait training, home exercise program, patient/family education, strengthening, transfer training  Outcome Summary/Treatment Plan (PT)  Anticipated Discharge Disposition (PT): home with home health, assisted living facility (assisted)  Plan of Care Reviewed With: patient  Outcome Summary: PT initial eval completed. Pt reports fatigue and states that she has not slept in 2 days, this limits endurance with PT eval. She completes bed mobility with CGA-max A x 2, she declines transfers and gait training d/t fatigue. Cont with IPPT to improve endurance and independence with mobility, recommend HHPT upon dc.      Time Calculation:   PT Charges     Row Name 08/10/19 1152             Time Calculation    Start Time  1020  -SR      PT Received On  08/10/19  -SR      PT Goal Re-Cert Due Date  08/20/19  -SR         Time Calculation- PT    Total Timed Code Minutes- PT  0 minute(s)  -SR        User Key  (r) = Recorded  By, (t) = Taken By, (c) = Cosigned By    Initials Name Provider Type    SR Susan Markham, PT Physical Therapist        Therapy Charges for Today     Code Description Service Date Service Provider Modifiers Qty    94209534430 HC PT EVAL LOW COMPLEXITY 4 8/10/2019 Susan Markham, PT GP 1          PT G-Codes  Outcome Measure Options: AM-PAC 6 Clicks Basic Mobility (PT)  AM-PAC 6 Clicks Score (PT): 18    Susan Markham, PT  8/10/2019

## 2019-08-11 NOTE — PLAN OF CARE
Problem: Fall Risk (Adult)  Goal: Absence of Fall  Outcome: Ongoing (interventions implemented as appropriate)   08/11/19 0323   Fall Risk (Adult)   Absence of Fall making progress toward outcome       Problem: Patient Care Overview  Goal: Discharge Needs Assessment  Outcome: Ongoing (interventions implemented as appropriate)   08/10/19 0500 08/10/19 0546   Disability   Equipment Currently Used at Home --  bath bench;wheelchair, motorized;walker, standard   Discharge Needs Assessment   Patient/Family Anticipates Transition to home --    Patient/Family Anticipated Services at Transition  --    Transportation Anticipated family or friend will provide --      Goal: Interprofessional Rounds/Family Conf  Outcome: Ongoing (interventions implemented as appropriate)   08/11/19 0323   Interdisciplinary Rounds/Family Conf   Participants ;family;nursing;patient;physician       Problem: Hospitalized Acutely Ill Older (Adult)  Goal: Signs and Symptoms of Listed Potential Problems Will be Absent, Minimized or Managed (Hospitalized Acutely Ill Older)  Outcome: Ongoing (interventions implemented as appropriate)   08/11/19 0323   Goal/Outcome Evaluation   Problems Assessed (Acutely Ill Older Adult) all   Problems Present (Acute Older Adult) fluid imbalance;situational response;sleep disturbance

## 2019-08-11 NOTE — DISCHARGE SUMMARY
[facility]  DISCHARGE SUMMARY    Patient Name: Bettye Sargent  : 3/28/1927  MRN: [id number]    Date of Admission: 2019  Date of Discharge:  2019  Primary Care Physician: Anand Akins MD    Consults     No orders found from 2019 to 8/10/2019.          Hospital Course     Presenting Problem:   Acute renal failure, unspecified acute renal failure type (CMS/HCC) [N17.9]    Active Hospital Problems    Diagnosis  POA   • **MALAIKA (acute kidney injury) (CMS/HCC) [N17.9]  Unknown   • Numbness and tingling in left hand [R20.0, R20.2]  Yes   • Abdominal pain [R10.9]  Unknown   • CKD (chronic kidney disease) stage 3, GFR 30-59 ml/min (CMS/[facility]) [N18.3]  Unknown   • Pyuria [N39.0]  Unknown   • Renal stone [N20.0]  Unknown   • History of supraventricular tachycardia [Z86.79]  Not Applicable   • Acute ischemic leg, s/p thrombectomy (Dr. Dillon, 19) [I99.8]  Yes   • Anemia [D64.9]  Yes      Resolved Hospital Problems   No resolved problems to display.          Hospital Course:  Bettye Sargent is a 89 y.o. female admitted with flank pain thought to be due to UTI vs recently passed nephrolithiasis. Found to have MALAIKA upon arrival and was admitted due to that. Her pain had subsided by time of my evaluation without intervention other than IV antibiotics. She will continue PO ceftin for 3 more days upon discharge for treatment of suspected UTI. Her MALAIKA/CKD was likely due to volume depletion related to recent initiation of HCTZ. That was held and she was given IV fluids which did somewhat improve her creatinine. Creatinine had not returned completely to baseline at discharge so I suggested to her that she see her PCP w/ BMP in 2-4 days.      Discharge Follow Up Recommendations for labs/diagnostics:   PCP in 2-4 days, Recommend BMP, Re: MALAIKA/CKD    Day of Discharge     HPI: Up in bed. Feels great. Wants to go home. Denies pain.    Review of Systems  Gen- No fevers,  chills  CV- No chest pain, palpitations  Resp- No cough, dyspnea  GI- No N/V/D, abd pain    Otherwise ROS is negative except as mentioned in the HPI.    Vital Signs:   Temp:  [97.7 °F (36.5 °C)-98.4 °F (36.9 °C)] 97.7 °F (36.5 °C)  Heart Rate:  [65-71] 71  Resp:  [18] 18  BP: (157-181)/(59-75) 172/59     Physical Exam:  Constitutional: No acute distress, awake, alert  HENT: NCAT, mucous membranes moist  Respiratory: Clear to auscultation bilaterally, respiratory effort normal   Cardiovascular: RRR, no murmurs, rubs, or gallops, palpable pedal pulses bilaterally  Gastrointestinal: Positive bowel sounds, soft, nontender, nondistended  Musculoskeletal: No bilateral ankle edema  Psychiatric: Appropriate affect, cooperative  Neurologic: Oriented x 3, strength symmetric in all extremities, Cranial Nerves grossly intact to confrontation, speech clear  Skin: No rashes    Pertinent  and/or Most Recent Results     Results from last 7 days   Lab Units 08/10/19  0841 08/10/19  0107   WBC 10*3/mm3 9.37 9.76   HEMOGLOBIN g/dL 8.8* 9.0*   HEMATOCRIT % 31.9* 31.5*   PLATELETS 10*3/mm3 283 214   SODIUM mmol/L 141 142   POTASSIUM mmol/L 4.1 4.4   CHLORIDE mmol/L 110* 106   CO2 mmol/L 19.0* 22.0   BUN mg/dL 38* 40*   CREATININE mg/dL 2.67* 2.86*   GLUCOSE mg/dL 144* 133*   CALCIUM mg/dL 8.7 9.1     Results from last 7 days   Lab Units 08/10/19  0107   BILIRUBIN mg/dL 0.4   ALK PHOS U/L 74   ALT (SGPT) U/L 9   AST (SGOT) U/L 15   PROTIME Seconds 13.1   INR  1.04           Invalid input(s): TG, LDLCALC, LDLREALC  Results from last 7 days   Lab Units 08/10/19  0107   LACTATE mmol/L 1.8       Brief Urine Lab Results  (Last result in the past 365 days)      Color   Clarity   Blood   Leuk Est   Nitrite   Protein   CREAT   Urine HCG        08/10/19 0059 Yellow Clear Negative Small (1+) Negative 30 mg/dL (1+)               Microbiology Results Abnormal     None          Imaging Results (all)     Procedure Component Value Units Date/Time    CT  Abdomen Pelvis Without Contrast [[id number]] Collected:  08/10/19 0234     Updated:  08/10/19 0236    Narrative:       CT Abdomen Pelvis WO    INDICATION:   Left flank pain    TECHNIQUE:   CT of the abdomen and pelvis without contrast. Coronal and sagittal reconstructions were obtained.  Radiation dose reduction techniques included automated exposure control or exposure modulation based on body size. Radiation audit for number of CT and  nuclear cardiology exams performed in the last year: 1.      COMPARISON:   CT abdomen pelvis July 2019    FINDINGS:    Visualized lung bases are unremarkable.    Abdomen: The liver and gallbladder and spleen and pancreas are normal. The aorta is normal in caliber. There is no bowel obstruction, and there is no abdominal or retroperitoneal adenopathy or other mass.    There is no hydronephrosis, there is a small nonobstructing 3 mm right renal upper pole calculus. There is no ureterolithiasis.    Pelvis:  There is extensive colonic diverticulosis but there is no convincing CT evidence of diverticulitis. There is no hernia or bowel obstruction.    There is no acute bony abnormality.      Impression:       Diverticulosis without CT evidence of diverticulitis.    3 mm nonobstructing right renal calculus, no hydronephrosis on either side.    No acute abnormality.      Signer Name: Fransico Carlton MD   Signed: 8/10/2019 2:34 AM   Workstation Name: [id number]    [facility]          Results for orders placed during the hospital encounter of 07/05/19   Duplex Lower Extremity Art / Grafts - Right CAR    Narrative · Occluded right mid SFA, reconstitutes in the distal popliteal via   collateral flow.  · At least 2 vessel flow to the foot is present.  · Heavily calcified right common femoral artery with markedly elevated   velocity at 351 cm/s.          Results for orders placed during the hospital encounter of 07/05/19   Duplex Lower Extremity Art / Grafts - Right CAR     Narrative · Occluded right mid SFA, reconstitutes in the distal popliteal via   collateral flow.  · At least 2 vessel flow to the foot is present.  · Heavily calcified right common femoral artery with markedly elevated   velocity at 351 cm/s.          Results for orders placed during the hospital encounter of 07/05/19   Adult Transthoracic Echo Complete W/ Cont if Necessary Per Protocol    Narrative · Mild tricuspid valve regurgitation is present.  · Mild mitral valve regurgitation is present  · Aortic valve maximum pressure gradient is 14.3 mmHg.  · Aortic valve mean pressure gradient is 6.8 mmHg.  · Aortic valve sclerosis/mild stenosis  · Normal LV systolic function  · EF=60%           Order Current Status    Urine Culture - Urine, Urine, Clean Catch In process        Discharge Details        Discharge Medications      New Medications      Instructions Start Date   cefuroxime 250 MG tablet  Commonly known as:  CEFTIN   250 mg, Oral, 2 Times Daily         Continue These Medications      Instructions Start Date   aspirin 325 MG tablet   325 mg, Oral, Daily      atenolol 50 MG tablet  Commonly known as:  TENORMIN   50 mg, Oral, Daily      clopidogrel 75 MG tablet  Commonly known as:  PLAVIX   75 mg, Oral, Daily      fluticasone 50 MCG/ACT nasal spray  Commonly known as:  FLONASE   2 sprays, Each Nare, Daily PRN      LUMIGAN 0.01 % ophthalmic drops  Generic drug:  bimatoprost   1 drop, Both Eyes, Nightly         Stop These Medications    hydrochlorothiazide 12.5 MG tablet  Commonly known as:  HYDRODIURIL            Allergies   Allergen Reactions   • Sulfa Antibiotics Other (See Comments)     Unknown     • Amoxicillin Rash         Discharge Disposition:  Home or Self Care    Discharge Diet:  Diet Order   Procedures   • Diet Regular         Discharge Activity: As tolerated        CODE STATUS:    Code Status and Medical Interventions:   Ordered at: 08/10/19 0431     Limited Support to NOT Include:    Intubation      Level Of Support Discussed With:    Patient     Code Status:    No CPR     Medical Interventions (Level of Support Prior to Arrest):    Limited         No future appointments.    Additional Instructions for the Follow-ups that You Need to Schedule     Discharge Follow-up with PCP   As directed       Currently Documented PCP:    Anand Akins MD    PCP Phone Number:    [phone]     Follow Up Details:  3 days w/ BMP               Time Spent on Discharge: 38 minutes    Electronically signed by Shyla Zamudio II, DO, 08/11/19, 10:59 AM.

## 2019-08-12 NOTE — OUTREACH NOTE
Prep Survey      Responses   Facility patient discharged from?  [facility]   Is patient eligible?  Yes   Discharge diagnosis  **MALAIKA (acute kidney injury   Does the patient have one of the following disease processes/diagnoses(primary or secondary)?  Other   Does the patient have Home health ordered?  No   Is there a DME ordered?  No   Prep survey completed?  Yes          Adraine Towsnend RN

## 2019-08-13 NOTE — OUTREACH NOTE
Medical Week 1 Survey      Responses   Facility patient discharged from?  [facility]   Does the patient have one of the following disease processes/diagnoses(primary or secondary)?  Other   Is there a successful TCM telephone encounter documented?  No   Week 1 attempt successful?  Yes   Call start time  1716   Call end time  1728   Discharge diagnosis  **MALAIKA (acute kidney injury   Is patient permission given to speak with other caregiver?  Yes   Person spoke with today (if not patient) and relationship  Dimple Dtrafal   Meds reviewed with patient/caregiver?  Yes   Is the patient having any side effects they believe may be caused by any medication additions or changes?  No   Does the patient have all medications ordered at discharge?  Yes   Is the patient taking all medications as directed (includes completed medication regime)?  Yes   Does the patient have a primary care provider?   Yes   Does the patient have an appointment with their PCP within 7 days of discharge?  Yes   Has the patient kept scheduled appointments due by today?  N/A   Has home health visited the patient within 72 hours of discharge?  N/A   Psychosocial issues?  No   Did the patient receive a copy of their discharge instructions?  Yes   Nursing interventions  Reviewed instructions with patient   What is the patient's perception of their health status since discharge?  Improving   Is the patient/caregiver able to teach back signs and symptoms related to disease process for when to call PCP?  Yes   Is the patient/caregiver able to teach back signs and symptoms related to disease process for when to call 911?  Yes   Is the patient/caregiver able to teach back the hierarchy of who to call/visit for symptoms/problems? PCP, Specialist, Home health nurse, Urgent Care, ED, 911  Yes   Additional teach back comments  She is doing well, encouraged foods high in iron, she is not on the ABX.   Week 1 call completed?  Yes          Charlotte Dacosta RN

## 2019-08-20 NOTE — OUTREACH NOTE
Medical Week 2 Survey      Responses   Facility patient discharged from?  [facility]   Does the patient have one of the following disease processes/diagnoses(primary or secondary)?  Other   Week 2 attempt successful?  Yes   Call start time  1042   Discharge diagnosis  **MALAIKA (acute kidney injury   Call end time  1052   Person spoke with today (if not patient) and relationship  Dimple Garcia   Meds reviewed with patient/caregiver?  Yes   Is the patient taking all medications as directed (includes completed medication regime)?  Yes   Has the patient kept scheduled appointments due by today?  N/A   Has home health visited the patient within 72 hours of discharge?  N/A   What is the patient's perception of their health status since discharge?  Improving   Week 2 Call Completed?  Yes   Wrap up additional comments  Doing well this week, looks forward to our calls to check on her.           Shyla Matt RN

## 2019-08-28 NOTE — OUTREACH NOTE
Medical Week 3 Survey      Responses   Facility patient discharged from?  [facility]   Does the patient have one of the following disease processes/diagnoses(primary or secondary)?  Other   Week 3 attempt successful?  No   Unsuccessful attempts  Attempt 1          Vangie Velasquez RN

## 2019-08-29 NOTE — OUTREACH NOTE
Medical Week 3 Survey      Responses   Facility patient discharged from?  [facility]   Does the patient have one of the following disease processes/diagnoses(primary or secondary)?  Other   Week 3 attempt successful?  Yes   Call start time  1437   Call end time  1440   Discharge diagnosis  MALAIKA (acute kidney injury   Person spoke with today (if not patient) and relationship  Dimple Garcia   Meds reviewed with patient/caregiver?  Yes   Is the patient taking all medications as directed (includes completed medication regime)?  Yes   Has the patient kept scheduled appointments due by today?  Yes   What is the patient's perception of their health status since discharge?  Improving   Week 3 Call Completed?  Yes   Wrap up additional comments  Doing good. Daughter would like last call next week.          Vangie Velasquez, RN

## 2019-09-07 NOTE — OUTREACH NOTE
Medical Week 4 Survey      Responses   Facility patient discharged from?  [facility]   Does the patient have one of the following disease processes/diagnoses(primary or secondary)?  Other   Week 4 attempt successful?  No          Kailyn Pichardo RN

## 2019-10-20 NOTE — ED PROVIDER NOTES
Subjective   Bettye Sargent is a 89 y.o. female who presents to the ED with complaints of a fall around 1100 this morning. She states that she was trying to get some orange juice when she slipped on the wooden floor and fell. She reports that she hit her head, however, she denies any loss of consciousness. Her daughter reports that she complained of a headache s/p the incident, however, patient reports that the headache has currently resolved. She denies any vision changes, nausea, vomiting, shoulder pain, hip pain, neck pain, or back pain. She is able to ambulate. Family advises that they went to the dentist, Dr. Francis, first because patient had knocked out some teeth. Family reports that the dentist prescribed her some Tramadol for her pain. She advises that she is on Plavix. She denies any recent surgeries, antibiotic use, or hospitalization. She denies any cardiac history. She has a history of peripheral vascular disease and neuropathy. Her PCP is Dr. Akins. There are no other acute complaints at this time.        History provided by:  Patient and relative  Fall   Mechanism of injury: fall    Injury location:  Head/neck  Head/neck injury location:  Head  Incident location:  Home  Time since incident:  5 hours  Arrived directly from scene: no    Fall:     Fall occurred: slipped.    Impact surface:  Hard floor    Point of impact:  Head    Entrapped after fall: no    Protective equipment: none    Suspicion of alcohol use: no    Suspicion of drug use: no    Tetanus status:  Unknown  Prior to arrival data:     Patient ambulatory at scene: yes      Blood loss:  Minimal    Responsiveness at scene:  Alert    Orientation at scene:  Time, situation, place and person    Loss of consciousness: no      Amnesic to event: no    Associated symptoms: headaches (resolved)    Associated symptoms: no back pain, no loss of consciousness, no nausea, no neck pain and no vomiting    Risk factors: anticoagulation therapy         Review of Systems   HENT: Positive for dental problem.    Eyes: Negative for visual disturbance.   Gastrointestinal: Negative for nausea and vomiting.   Musculoskeletal: Negative for arthralgias, back pain and neck pain.   Neurological: Positive for headaches (resolved). Negative for loss of consciousness.   All other systems reviewed and are negative.      Past Medical History:   Diagnosis Date   • Impaired functional mobility, balance, gait, and endurance    • Neuropathy    • Peripheral vascular disease (CMS/HCC)    • Shingles    • Tachycardia        Allergies   Allergen Reactions   • Sulfa Antibiotics Other (See Comments)     Unknown     • Amoxicillin Rash       Past Surgical History:   Procedure Laterality Date   • FEMORAL THROMBECTOMY/EMBOLECTOMY Right 7/5/2019    Procedure: Right Femoral artery cutdown with Angioplasty and stent of the proximal right common iliac artery;  Surgeon: Candido Dillon MD;  Location: [facility] [id number];  Service: Cardiothoracic       Family History   Problem Relation Age of Onset   • No Known Problems Mother        Social History     Socioeconomic History   • Marital status:      Spouse name: Not on file   • Number of children: 5   • Years of education: Not on file   • Highest education level: Not on file   Occupational History   • Occupation: Homemaker   Tobacco Use   • Smoking status: Former Smoker   • Smokeless tobacco: Never Used   Substance and Sexual Activity   • Alcohol use: No     Frequency: Never   • Drug use: No   Social History Narrative    Lives in [place], [place] at assisted living facility         Objective   Physical Exam   Constitutional: She is oriented to person, place, and time. She appears well-developed and well-nourished.   HENT:   Head: Normocephalic. Head is with contusion and with laceration. Head is without raccoon's eyes and without Mcginnis's sign.       Right Ear: Ear canal normal. No drainage.   Left Ear: Ear canal normal. No drainage.    Nose: Nose normal. No rhinorrhea or nasal septal hematoma.   Periorbital bruising noted. 1 cm superficial laceration above right eyebrow. Small laceration to internal upper lip. Missing 4 teeth. Airway intact.   Eyes: Conjunctivae are normal. No scleral icterus.   Periorbital ecchymosis on the right.  Full range of motion of the eye.  Next extraocular motions intact.  No hyphema.  No sub-conjunctiva hemorrhage.  Vision intact.   Neck: Normal range of motion. Neck supple.   Cardiovascular: Normal rate, regular rhythm, normal heart sounds and intact distal pulses.   No murmur heard.  Pulmonary/Chest: Effort normal and breath sounds normal. No respiratory distress.   Abdominal: Soft. There is no tenderness.   Musculoskeletal: She exhibits no deformity.   Neurological: She is alert and oriented to person, place, and time.   Skin: Skin is warm and dry. Bruising and laceration noted.   Superficial facial laceration to the right eyebrow. Small laceration to internal upper lip.   Psychiatric: She has a normal mood and affect. Her behavior is normal.   Nursing note and vitals reviewed.      Laceration Repair  Date/Time: 10/20/2019 4:30 PM  Performed by: Cordell De Oliveira MD  Authorized by: Cordell De Oliveira MD     Consent:     Consent obtained:  Verbal    Consent given by:  Patient    Risks discussed:  Pain, need for additional repair, poor cosmetic result, poor wound healing and infection  Anesthesia (see MAR for exact dosages):     Anesthesia method:  Local infiltration    Local anesthetic:  Lidocaine 1% w/o epi  Laceration details:     Location:  Face    Face location:  R eyebrow    Length (cm):  1  Repair type:     Repair type:  Simple  Pre-procedure details:     Preparation:  Patient was prepped and draped in usual sterile fashion  Exploration:     Wound exploration: entire depth of wound probed and visualized      Wound extent: no underlying fracture noted      Wound extent comment:  Superficial  Treatment:      Area cleansed with:  Hibiclens    Amount of cleaning:  Standard    Visualized foreign bodies/material removed: no    Skin repair:     Repair method:  Sutures    Suture size:  5-0    Suture material:  Nylon    Suture technique:  Simple interrupted    Number of sutures:  2  Approximation:     Approximation:  Close    Vermilion border: well-aligned    Post-procedure details:     Dressing:  Adhesive bandage    Patient tolerance of procedure:  Tolerated well, no immediate complications                 ED Course  ED Course as of Oct 20 1716   Sun Oct 20, 2019   1628 At bedside re-evaluating the patient. -BESSIE  [NP]   3109 Primary closure with 2 sutures performed the laceration.  Antibiotic not indicated secondary to the patient not having any significant valvular history and no contaminated wounds.  Wound care with follow-up and return instructions discussed.  Potential for delayed bleed especially with history of Plavix discussed. I had a discussion with the patient/family regarding diagnosis, diagnostic results, treatment plan, and medications.  The patient/family indicated understanding of these instructions.  I spent adequate time at the bedside proceeding discharge necessary to personally discuss the aftercare instructions, giving patient education, providing explanations of the results of our evaluations/findings, and my decision making to assure that the patient/family understand the plan of care.  Time was allotted to answer questions at that time and throughout the ED course.  Emphasis was placed on timely follow-up after discharge.  I also discussed the potential for the development of an acute emergent condition requiring further evaluation, admission, or even surgical intervention. I discussed that we found nothing during the visit today indicating the need for further workup, admission, or the presence of an unstable medical condition.  I encouraged the patient to return to the emergency department  "immediately for ANY concerns, worsening, new complaints, or if symptoms persist and unable to seek follow-up in a timely fashion.  The patient/family expressed understanding and agreement with this plan.   [RS]      ED Course User Index  [NP] Isabela Gaines  [RS] Cordell De Oliveira MD     No results found for this or any previous visit (from the past 24 hour(s)).  Note: In addition to lab results from this visit, the labs listed above may include labs taken at another facility or during a different encounter within the last 24 hours. Please correlate lab times with ED admission and discharge times for further clarification of the services performed during this visit.    CT Head Without Contrast   Preliminary Result   Atrophy and chronic changes seen within the brain with no   evidence of acute intracranial normality. Soft tissue hematoma overlying   the right orbit.                Vitals:    10/20/19 1448   BP: 145/75   BP Location: Right arm   Patient Position: Sitting   Pulse: 66   Resp: 14   Temp: 97.5 °F (36.4 °C)   TempSrc: Oral   SpO2: 94%   Weight: 65.8 kg (145 lb)   Height: 160 cm (63\")     Medications - No data to display  ECG/EMG Results (last 24 hours)     ** No results found for the last 24 hours. **        No orders to display                       MDM  Number of Diagnoses or Management Options  Antiplatelet or antithrombotic long-term use:   Dental trauma, initial encounter:   Elevated blood pressure reading with diagnosis of hypertension:   Facial contusion, initial encounter:   Fall in home, initial encounter:   Forehead laceration, initial encounter:      Amount and/or Complexity of Data Reviewed  Tests in the radiology section of CPT®: reviewed  Obtain history from someone other than the patient: yes  Independent visualization of images, tracings, or specimens: yes        Final diagnoses:   Fall in home, initial encounter   Forehead laceration, initial encounter   Facial contusion, initial " encounter   Dental trauma, initial encounter   Elevated blood pressure reading with diagnosis of hypertension   Antiplatelet or antithrombotic long-term use     EMR Dragon/Transcription disclaimer:   Much of this encounter note is an electronic transcription/translation of spoken language to printed text. The electronic translation of spoken language may permit erroneous, or at times, nonsensical words or phrases to be inadvertently transcribed; Although I have reviewed the note for such errors, some may still exist.       Documentation assistance provided by heaven Gaines.  Information recorded by the scribe was done at my direction and has been verified and validated by me.        Isabela Gaines  10/20/19 164       Cordell De Oliveira MD  10/20/19 8648

## 2020-01-01 ENCOUNTER — APPOINTMENT (OUTPATIENT)
Dept: MRI IMAGING | Facility: HOSPITAL | Age: 85
End: 2020-01-01

## 2020-01-01 ENCOUNTER — APPOINTMENT (OUTPATIENT)
Dept: CT IMAGING | Facility: HOSPITAL | Age: 85
End: 2020-01-01

## 2020-01-01 ENCOUNTER — APPOINTMENT (OUTPATIENT)
Dept: CARDIOLOGY | Facility: HOSPITAL | Age: 85
End: 2020-01-01

## 2020-01-01 ENCOUNTER — HOSPITAL ENCOUNTER (OUTPATIENT)
Dept: GENERAL RADIOLOGY | Facility: HOSPITAL | Age: 85
Discharge: HOME | End: 2020-01-27
Admitting: FAMILY MEDICINE

## 2020-01-01 ENCOUNTER — HOSPITAL ENCOUNTER (INPATIENT)
Facility: HOSPITAL | Age: 85
LOS: 4 days | End: 2020-02-14
Attending: INTERNAL MEDICINE | Admitting: INTERNAL MEDICINE

## 2020-01-01 ENCOUNTER — TRANSCRIBE ORDERS (OUTPATIENT)
Dept: ADMINISTRATIVE | Facility: HOSPITAL | Age: 85
End: 2020-01-01

## 2020-01-01 ENCOUNTER — HOSPITAL ENCOUNTER (INPATIENT)
Facility: HOSPITAL | Age: 85
LOS: 1 days | End: 2020-02-10
Attending: EMERGENCY MEDICINE | Admitting: FAMILY MEDICINE

## 2020-01-01 ENCOUNTER — APPOINTMENT (OUTPATIENT)
Dept: GENERAL RADIOLOGY | Facility: HOSPITAL | Age: 85
End: 2020-01-01

## 2020-01-01 VITALS
RESPIRATION RATE: 16 BRPM | HEART RATE: 85 BPM | WEIGHT: 133.38 LBS | BODY MASS INDEX: 24.54 KG/M2 | TEMPERATURE: 97.5 F | SYSTOLIC BLOOD PRESSURE: 183 MMHG | DIASTOLIC BLOOD PRESSURE: 74 MMHG | HEIGHT: 62 IN | OXYGEN SATURATION: 92 %

## 2020-01-01 VITALS
BODY MASS INDEX: 24.53 KG/M2 | WEIGHT: 133.3 LBS | HEIGHT: 62 IN | RESPIRATION RATE: 20 BRPM | OXYGEN SATURATION: 84 % | DIASTOLIC BLOOD PRESSURE: 53 MMHG | TEMPERATURE: 98.3 F | HEART RATE: 64 BPM | SYSTOLIC BLOOD PRESSURE: 150 MMHG

## 2020-01-01 DIAGNOSIS — M54.6 THORACIC BACK PAIN, UNSPECIFIED BACK PAIN LATERALITY, UNSPECIFIED CHRONICITY: ICD-10-CM

## 2020-01-01 DIAGNOSIS — N28.9 RENAL INSUFFICIENCY: ICD-10-CM

## 2020-01-01 DIAGNOSIS — D64.9 ACUTE ON CHRONIC ANEMIA: ICD-10-CM

## 2020-01-01 DIAGNOSIS — R41.841 COGNITIVE COMMUNICATION DEFICIT: ICD-10-CM

## 2020-01-01 DIAGNOSIS — R79.89 ELEVATED BRAIN NATRIURETIC PEPTIDE (BNP) LEVEL: ICD-10-CM

## 2020-01-01 DIAGNOSIS — R06.02 SHORTNESS OF BREATH: Primary | ICD-10-CM

## 2020-01-01 DIAGNOSIS — R07.81 RIB PAIN ON LEFT SIDE: ICD-10-CM

## 2020-01-01 DIAGNOSIS — I16.0 HYPERTENSIVE URGENCY: ICD-10-CM

## 2020-01-01 DIAGNOSIS — R13.10 DYSPHAGIA, UNSPECIFIED TYPE: ICD-10-CM

## 2020-01-01 DIAGNOSIS — J90 PLEURAL EFFUSION, RIGHT: ICD-10-CM

## 2020-01-01 DIAGNOSIS — R07.81 RIB PAIN ON LEFT SIDE: Primary | ICD-10-CM

## 2020-01-01 LAB
ABO + RH BLD: NORMAL
ABO + RH BLD: NORMAL
ABO GROUP BLD: NORMAL
ALBUMIN SERPL-MCNC: 4.2 G/DL (ref 3.5–5.2)
ALBUMIN/GLOB SERPL: 1.6 G/DL
ALP SERPL-CCNC: 101 U/L (ref 39–117)
ALT SERPL W P-5'-P-CCNC: 17 U/L (ref 1–33)
ANION GAP SERPL CALCULATED.3IONS-SCNC: 12 MMOL/L (ref 5–15)
ANION GAP SERPL CALCULATED.3IONS-SCNC: 13 MMOL/L (ref 5–15)
ARTERIAL PATENCY WRIST A: ABNORMAL
AST SERPL-CCNC: 18 U/L (ref 1–32)
ATMOSPHERIC PRESS: ABNORMAL MM[HG]
BASE EXCESS BLDA CALC-SCNC: -2.3 MMOL/L (ref 0–2)
BASOPHILS # BLD AUTO: 0.02 10*3/MM3 (ref 0–0.2)
BASOPHILS # BLD AUTO: 0.03 10*3/MM3 (ref 0–0.2)
BASOPHILS NFR BLD AUTO: 0.3 % (ref 0–1.5)
BASOPHILS NFR BLD AUTO: 0.3 % (ref 0–1.5)
BDY SITE: ABNORMAL
BH BB BLOOD EXPIRATION DATE: NORMAL
BH BB BLOOD EXPIRATION DATE: NORMAL
BH BB BLOOD TYPE BARCODE: 6200
BH BB BLOOD TYPE BARCODE: 6200
BH BB DISPENSE STATUS: NORMAL
BH BB DISPENSE STATUS: NORMAL
BH BB PRODUCT CODE: NORMAL
BH BB PRODUCT CODE: NORMAL
BH BB UNIT NUMBER: NORMAL
BH BB UNIT NUMBER: NORMAL
BH CV ECHO MEAS - AO ROOT AREA (BSA CORRECTED): 1.7
BH CV ECHO MEAS - AO ROOT AREA: 5.9 CM^2
BH CV ECHO MEAS - AO ROOT DIAM: 2.7 CM
BH CV ECHO MEAS - BSA(HAYCOCK): 1.6 M^2
BH CV ECHO MEAS - BSA: 1.6 M^2
BH CV ECHO MEAS - BZI_BMI: 24.3 KILOGRAMS/M^2
BH CV ECHO MEAS - BZI_METRIC_HEIGHT: 157.5 CM
BH CV ECHO MEAS - BZI_METRIC_WEIGHT: 60.3 KG
BH CV ECHO MEAS - EDV(CUBED): 108.7 ML
BH CV ECHO MEAS - EDV(TEICH): 106.1 ML
BH CV ECHO MEAS - EF(CUBED): 61 %
BH CV ECHO MEAS - EF(TEICH): 52.5 %
BH CV ECHO MEAS - ESV(CUBED): 42.4 ML
BH CV ECHO MEAS - ESV(TEICH): 50.4 ML
BH CV ECHO MEAS - FS: 26.9 %
BH CV ECHO MEAS - IVS/LVPW: 0.93
BH CV ECHO MEAS - IVSD: 0.9 CM
BH CV ECHO MEAS - LA DIMENSION: 4.2 CM
BH CV ECHO MEAS - LA/AO: 1.5
BH CV ECHO MEAS - LAD MAJOR: 6 CM
BH CV ECHO MEAS - LAT PEAK E' VEL: 8.2 CM/SEC
BH CV ECHO MEAS - LATERAL E/E' RATIO: 11.6
BH CV ECHO MEAS - LV MASS(C)D: 155 GRAMS
BH CV ECHO MEAS - LV MASS(C)DI: 96.4 GRAMS/M^2
BH CV ECHO MEAS - LVIDD: 4.8 CM
BH CV ECHO MEAS - LVIDS: 3.5 CM
BH CV ECHO MEAS - LVPWD: 0.98 CM
BH CV ECHO MEAS - MED PEAK E' VEL: 3.7 CM/SEC
BH CV ECHO MEAS - MEDIAL E/E' RATIO: 25.1
BH CV ECHO MEAS - MV A MAX VEL: 93.8 CM/SEC
BH CV ECHO MEAS - MV DEC SLOPE: 419 CM/SEC^2
BH CV ECHO MEAS - MV DEC TIME: 0.19 SEC
BH CV ECHO MEAS - MV E MAX VEL: 96.7 CM/SEC
BH CV ECHO MEAS - MV E/A: 1
BH CV ECHO MEAS - MV P1/2T MAX VEL: 125.7 CM/SEC
BH CV ECHO MEAS - MV P1/2T: 87.9 MSEC
BH CV ECHO MEAS - MVA P1/2T LCG: 1.7 CM^2
BH CV ECHO MEAS - MVA(P1/2T): 2.5 CM^2
BH CV ECHO MEAS - PA ACC SLOPE: 336.4 CM/SEC^2
BH CV ECHO MEAS - PA ACC TIME: 0.14 SEC
BH CV ECHO MEAS - PA PR(ACCEL): 15.6 MMHG
BH CV ECHO MEAS - RAP SYSTOLE: 3 MMHG
BH CV ECHO MEAS - RV MAX PG: 0.55 MMHG
BH CV ECHO MEAS - RV V1 MAX: 37 CM/SEC
BH CV ECHO MEAS - RVSP: 35 MMHG
BH CV ECHO MEAS - SI(CUBED): 41.2 ML/M^2
BH CV ECHO MEAS - SI(TEICH): 34.6 ML/M^2
BH CV ECHO MEAS - SV(CUBED): 66.3 ML
BH CV ECHO MEAS - SV(TEICH): 55.6 ML
BH CV ECHO MEAS - TAPSE (>1.6): 2 CM2
BH CV ECHO MEAS - TR MAX PG: 32 MMHG
BH CV ECHO MEAS - TR MAX VEL: 283.7 CM/SEC
BH CV ECHO MEASUREMENTS AVERAGE E/E' RATIO: 16.25
BH CV XLRA - RV BASE: 3.6 CM
BH CV XLRA - RV LENGTH: 6.8 CM
BH CV XLRA - RV MID: 2.9 CM
BH CV XLRA - TDI S': 8.88 CM/SEC
BILIRUB SERPL-MCNC: 1.1 MG/DL (ref 0.2–1.2)
BLD GP AB SCN SERPL QL: NEGATIVE
BODY TEMPERATURE: 37 C
BUN BLD-MCNC: 45 MG/DL (ref 8–23)
BUN BLD-MCNC: 49 MG/DL (ref 8–23)
BUN/CREAT SERPL: 22.3 (ref 7–25)
BUN/CREAT SERPL: 25.8 (ref 7–25)
CALCIUM SPEC-SCNC: 9 MG/DL (ref 8.2–9.6)
CALCIUM SPEC-SCNC: 9.6 MG/DL (ref 8.2–9.6)
CHLORIDE SERPL-SCNC: 109 MMOL/L (ref 98–107)
CHLORIDE SERPL-SCNC: 110 MMOL/L (ref 98–107)
CHOLEST SERPL-MCNC: 120 MG/DL (ref 0–200)
CO2 BLDA-SCNC: 26.9 MMOL/L (ref 22–33)
CO2 SERPL-SCNC: 19 MMOL/L (ref 22–29)
CO2 SERPL-SCNC: 23 MMOL/L (ref 22–29)
COHGB MFR BLD: 2.1 % (ref 0–2)
CREAT BLD-MCNC: 1.9 MG/DL (ref 0.57–1)
CREAT BLD-MCNC: 2.02 MG/DL (ref 0.57–1)
D-LACTATE SERPL-SCNC: 1.3 MMOL/L (ref 0.5–2)
DACRYOCYTES BLD QL SMEAR: NORMAL
DEPRECATED RDW RBC AUTO: 45.4 FL (ref 37–54)
DEPRECATED RDW RBC AUTO: 51.5 FL (ref 37–54)
DEVELOPER EXPIRATION DATE: NORMAL
DEVELOPER LOT NUMBER: NORMAL
EOSINOPHIL # BLD AUTO: 0 10*3/MM3 (ref 0–0.4)
EOSINOPHIL # BLD AUTO: 0 10*3/MM3 (ref 0–0.4)
EOSINOPHIL NFR BLD AUTO: 0 % (ref 0.3–6.2)
EOSINOPHIL NFR BLD AUTO: 0 % (ref 0.3–6.2)
EPAP: 0
ERYTHROCYTE [DISTWIDTH] IN BLOOD BY AUTOMATED COUNT: 19.5 % (ref 12.3–15.4)
ERYTHROCYTE [DISTWIDTH] IN BLOOD BY AUTOMATED COUNT: 20.1 % (ref 12.3–15.4)
EXPIRATION DATE: NORMAL
FECAL OCCULT BLOOD SCREEN, POC: NEGATIVE
FERRITIN SERPL-MCNC: 14.96 NG/ML (ref 13–150)
FOLATE SERPL-MCNC: 9.86 NG/ML (ref 4.78–24.2)
GFR SERPL CREATININE-BSD FRML MDRD: 23 ML/MIN/1.73
GFR SERPL CREATININE-BSD FRML MDRD: 25 ML/MIN/1.73
GLOBULIN UR ELPH-MCNC: 2.7 GM/DL
GLUCOSE BLD-MCNC: 117 MG/DL (ref 65–99)
GLUCOSE BLD-MCNC: 132 MG/DL (ref 65–99)
GLUCOSE BLDC GLUCOMTR-MCNC: 102 MG/DL (ref 70–130)
GLUCOSE BLDC GLUCOMTR-MCNC: 104 MG/DL (ref 70–130)
GLUCOSE BLDC GLUCOMTR-MCNC: 123 MG/DL (ref 70–130)
HBA1C MFR BLD: 5.4 % (ref 4.8–5.6)
HCO3 BLDA-SCNC: 25.2 MMOL/L (ref 20–26)
HCT VFR BLD AUTO: 22.5 % (ref 34–46.6)
HCT VFR BLD AUTO: 25.6 % (ref 34–46.6)
HCT VFR BLD AUTO: 26.6 % (ref 34–46.6)
HCT VFR BLD AUTO: 33.7 % (ref 34–46.6)
HCT VFR BLD CALC: 23 %
HDLC SERPL-MCNC: 40 MG/DL (ref 40–60)
HGB BLD-MCNC: 6 G/DL (ref 12–15.9)
HGB BLD-MCNC: 6.6 G/DL (ref 12–15.9)
HGB BLD-MCNC: 7.1 G/DL (ref 12–15.9)
HGB BLD-MCNC: 9.6 G/DL (ref 12–15.9)
HGB BLDA-MCNC: 7.5 G/DL (ref 14–18)
HOLD SPECIMEN: NORMAL
HOLD SPECIMEN: NORMAL
HOROWITZ INDEX BLD+IHG-RTO: 28 %
HYPOCHROMIA BLD QL: NORMAL
IMM GRANULOCYTES # BLD AUTO: 0.03 10*3/MM3 (ref 0–0.05)
IMM GRANULOCYTES # BLD AUTO: 0.06 10*3/MM3 (ref 0–0.05)
IMM GRANULOCYTES NFR BLD AUTO: 0.4 % (ref 0–0.5)
IMM GRANULOCYTES NFR BLD AUTO: 0.6 % (ref 0–0.5)
INR PPP: 1.19 (ref 0.85–1.16)
IPAP: 0
IRON 24H UR-MRATE: 10 MCG/DL (ref 37–145)
IRON 24H UR-MRATE: 10 MCG/DL (ref 37–145)
IRON SATN MFR SERPL: 2 % (ref 20–50)
LDLC SERPL CALC-MCNC: 56 MG/DL (ref 0–100)
LDLC/HDLC SERPL: 1.41 {RATIO}
LEFT ATRIUM VOLUME INDEX: 36.7 ML/M^2
LEFT ATRIUM VOLUME: 59 ML
LYMPHOCYTES # BLD AUTO: 0.78 10*3/MM3 (ref 0.7–3.1)
LYMPHOCYTES # BLD AUTO: 1.09 10*3/MM3 (ref 0.7–3.1)
LYMPHOCYTES NFR BLD AUTO: 11.1 % (ref 19.6–45.3)
LYMPHOCYTES NFR BLD AUTO: 11.4 % (ref 19.6–45.3)
Lab: NORMAL
MCH RBC QN AUTO: 17.8 PG (ref 26.6–33)
MCH RBC QN AUTO: 18.3 PG (ref 26.6–33)
MCHC RBC AUTO-ENTMCNC: 24.8 G/DL (ref 31.5–35.7)
MCHC RBC AUTO-ENTMCNC: 27.7 G/DL (ref 31.5–35.7)
MCV RBC AUTO: 66.1 FL (ref 79–97)
MCV RBC AUTO: 71.9 FL (ref 79–97)
METHGB BLD QL: 0.6 % (ref 0–1.5)
MICROCYTES BLD QL: NORMAL
MODALITY: ABNORMAL
MONOCYTES # BLD AUTO: 0.81 10*3/MM3 (ref 0.1–0.9)
MONOCYTES # BLD AUTO: 1.06 10*3/MM3 (ref 0.1–0.9)
MONOCYTES NFR BLD AUTO: 11.1 % (ref 5–12)
MONOCYTES NFR BLD AUTO: 11.5 % (ref 5–12)
NEGATIVE CONTROL: NEGATIVE
NEUTROPHILS # BLD AUTO: 5.41 10*3/MM3 (ref 1.7–7)
NEUTROPHILS # BLD AUTO: 7.28 10*3/MM3 (ref 1.7–7)
NEUTROPHILS NFR BLD AUTO: 76.6 % (ref 42.7–76)
NEUTROPHILS NFR BLD AUTO: 76.7 % (ref 42.7–76)
NOTE: ABNORMAL
NRBC BLD AUTO-RTO: 0.4 /100 WBC (ref 0–0.2)
NRBC BLD AUTO-RTO: 0.6 /100 WBC (ref 0–0.2)
NT-PROBNP SERPL-MCNC: ABNORMAL PG/ML (ref 5–1800)
OXYHGB MFR BLDV: 92 % (ref 94–99)
PAW @ PEAK INSP FLOW SETTING VENT: 0 CMH2O
PCO2 BLDA: 58.3 MM HG (ref 35–45)
PCO2 TEMP ADJ BLD: 58.3 MM HG (ref 35–45)
PH BLDA: 7.24 PH UNITS (ref 7.35–7.45)
PH, TEMP CORRECTED: 7.24 PH UNITS
PLAT MORPH BLD: NORMAL
PLATELET # BLD AUTO: 313 10*3/MM3 (ref 140–450)
PLATELET # BLD AUTO: 394 10*3/MM3 (ref 140–450)
PMV BLD AUTO: 10.6 FL (ref 6–12)
PMV BLD AUTO: 9.8 FL (ref 6–12)
PO2 BLDA: 76.6 MM HG (ref 83–108)
PO2 TEMP ADJ BLD: 76.6 MM HG (ref 83–108)
POSITIVE CONTROL: POSITIVE
POTASSIUM BLD-SCNC: 4.8 MMOL/L (ref 3.5–5.2)
POTASSIUM BLD-SCNC: 4.9 MMOL/L (ref 3.5–5.2)
PROT SERPL-MCNC: 6.9 G/DL (ref 6–8.5)
PROTHROMBIN TIME: 14.5 SECONDS (ref 11.2–14.3)
RBC # BLD AUTO: 3.7 10*6/MM3 (ref 3.77–5.28)
RBC # BLD AUTO: 3.87 10*6/MM3 (ref 3.77–5.28)
RETICS # AUTO: 0.07 10*6/MM3 (ref 0.02–0.13)
RETICS/RBC NFR AUTO: 1.75 % (ref 0.7–1.9)
RH BLD: POSITIVE
SODIUM BLD-SCNC: 142 MMOL/L (ref 136–145)
SODIUM BLD-SCNC: 144 MMOL/L (ref 136–145)
T&S EXPIRATION DATE: NORMAL
TARGETS BLD QL SMEAR: NORMAL
TIBC SERPL-MCNC: 551 MCG/DL (ref 298–536)
TOTAL RATE: 0 BREATHS/MINUTE
TRANSFERRIN SERPL-MCNC: 370 MG/DL (ref 200–360)
TRIGL SERPL-MCNC: 119 MG/DL (ref 0–150)
TROPONIN T SERPL-MCNC: 0.01 NG/ML (ref 0–0.03)
UNIT  ABO: NORMAL
UNIT  ABO: NORMAL
UNIT  RH: NORMAL
UNIT  RH: NORMAL
VIT B12 BLD-MCNC: 333 PG/ML (ref 211–946)
VLDLC SERPL-MCNC: 23.8 MG/DL
WBC MORPH BLD: NORMAL
WBC NRBC COR # BLD: 7.05 10*3/MM3 (ref 3.4–10.8)
WBC NRBC COR # BLD: 9.52 10*3/MM3 (ref 3.4–10.8)
WHOLE BLOOD HOLD SPECIMEN: NORMAL
WHOLE BLOOD HOLD SPECIMEN: NORMAL

## 2020-01-01 PROCEDURE — 86901 BLOOD TYPING SEROLOGIC RH(D): CPT | Performed by: NURSE PRACTITIONER

## 2020-01-01 PROCEDURE — 70450 CT HEAD/BRAIN W/O DYE: CPT

## 2020-01-01 PROCEDURE — 86900 BLOOD TYPING SEROLOGIC ABO: CPT | Performed by: NURSE PRACTITIONER

## 2020-01-01 PROCEDURE — 74176 CT ABD & PELVIS W/O CONTRAST: CPT

## 2020-01-01 PROCEDURE — 25010000002 HYDROMORPHONE PER 4 MG: Performed by: NURSE PRACTITIONER

## 2020-01-01 PROCEDURE — 85007 BL SMEAR W/DIFF WBC COUNT: CPT | Performed by: EMERGENCY MEDICINE

## 2020-01-01 PROCEDURE — 25010000002 HALOPERIDOL LACTATE PER 5 MG: Performed by: INTERNAL MEDICINE

## 2020-01-01 PROCEDURE — 25010000002 LORAZEPAM PER 2 MG: Performed by: NURSE PRACTITIONER

## 2020-01-01 PROCEDURE — 99284 EMERGENCY DEPT VISIT MOD MDM: CPT

## 2020-01-01 PROCEDURE — 25010000002 HYDROMORPHONE PER 4 MG: Performed by: INTERNAL MEDICINE

## 2020-01-01 PROCEDURE — 94799 UNLISTED PULMONARY SVC/PX: CPT

## 2020-01-01 PROCEDURE — G0378 HOSPITAL OBSERVATION PER HR: HCPCS

## 2020-01-01 PROCEDURE — 25010000002 ONDANSETRON PER 1 MG: Performed by: NURSE PRACTITIONER

## 2020-01-01 PROCEDURE — 86900 BLOOD TYPING SEROLOGIC ABO: CPT

## 2020-01-01 PROCEDURE — 93005 ELECTROCARDIOGRAM TRACING: CPT | Performed by: INTERNAL MEDICINE

## 2020-01-01 PROCEDURE — 71250 CT THORAX DX C-: CPT

## 2020-01-01 PROCEDURE — 25010000002 MORPHINE PER 10 MG: Performed by: FAMILY MEDICINE

## 2020-01-01 PROCEDURE — 82746 ASSAY OF FOLIC ACID SERUM: CPT | Performed by: NURSE PRACTITIONER

## 2020-01-01 PROCEDURE — 71101 X-RAY EXAM UNILAT RIBS/CHEST: CPT

## 2020-01-01 PROCEDURE — 94660 CPAP INITIATION&MGMT: CPT

## 2020-01-01 PROCEDURE — 71045 X-RAY EXAM CHEST 1 VIEW: CPT

## 2020-01-01 PROCEDURE — 70551 MRI BRAIN STEM W/O DYE: CPT

## 2020-01-01 PROCEDURE — 25010000002 KETOROLAC TROMETHAMINE PER 15 MG: Performed by: INTERNAL MEDICINE

## 2020-01-01 PROCEDURE — 80061 LIPID PANEL: CPT | Performed by: INTERNAL MEDICINE

## 2020-01-01 PROCEDURE — 83540 ASSAY OF IRON: CPT | Performed by: NURSE PRACTITIONER

## 2020-01-01 PROCEDURE — 25010000002 LORAZEPAM PER 2 MG: Performed by: FAMILY MEDICINE

## 2020-01-01 PROCEDURE — 85025 COMPLETE CBC W/AUTO DIFF WBC: CPT | Performed by: EMERGENCY MEDICINE

## 2020-01-01 PROCEDURE — 25010000002 LORAZEPAM PER 2 MG: Performed by: INTERNAL MEDICINE

## 2020-01-01 PROCEDURE — 93306 TTE W/DOPPLER COMPLETE: CPT | Performed by: INTERNAL MEDICINE

## 2020-01-01 PROCEDURE — 82270 OCCULT BLOOD FECES: CPT | Performed by: EMERGENCY MEDICINE

## 2020-01-01 PROCEDURE — 84484 ASSAY OF TROPONIN QUANT: CPT | Performed by: EMERGENCY MEDICINE

## 2020-01-01 PROCEDURE — 92610 EVALUATE SWALLOWING FUNCTION: CPT

## 2020-01-01 PROCEDURE — 85014 HEMATOCRIT: CPT | Performed by: NURSE PRACTITIONER

## 2020-01-01 PROCEDURE — 80053 COMPREHEN METABOLIC PANEL: CPT | Performed by: EMERGENCY MEDICINE

## 2020-01-01 PROCEDURE — 93005 ELECTROCARDIOGRAM TRACING: CPT | Performed by: EMERGENCY MEDICINE

## 2020-01-01 PROCEDURE — 82728 ASSAY OF FERRITIN: CPT | Performed by: NURSE PRACTITIONER

## 2020-01-01 PROCEDURE — 83880 ASSAY OF NATRIURETIC PEPTIDE: CPT | Performed by: EMERGENCY MEDICINE

## 2020-01-01 PROCEDURE — 82962 GLUCOSE BLOOD TEST: CPT

## 2020-01-01 PROCEDURE — 83036 HEMOGLOBIN GLYCOSYLATED A1C: CPT | Performed by: INTERNAL MEDICINE

## 2020-01-01 PROCEDURE — 93306 TTE W/DOPPLER COMPLETE: CPT

## 2020-01-01 PROCEDURE — 85014 HEMATOCRIT: CPT | Performed by: INTERNAL MEDICINE

## 2020-01-01 PROCEDURE — 86850 RBC ANTIBODY SCREEN: CPT | Performed by: NURSE PRACTITIONER

## 2020-01-01 PROCEDURE — 99239 HOSP IP/OBS DSCHRG MGMT >30: CPT | Performed by: FAMILY MEDICINE

## 2020-01-01 PROCEDURE — 85018 HEMOGLOBIN: CPT | Performed by: NURSE PRACTITIONER

## 2020-01-01 PROCEDURE — 82607 VITAMIN B-12: CPT | Performed by: NURSE PRACTITIONER

## 2020-01-01 PROCEDURE — 93005 ELECTROCARDIOGRAM TRACING: CPT

## 2020-01-01 PROCEDURE — 72072 X-RAY EXAM THORAC SPINE 3VWS: CPT

## 2020-01-01 PROCEDURE — 85018 HEMOGLOBIN: CPT | Performed by: INTERNAL MEDICINE

## 2020-01-01 PROCEDURE — 82805 BLOOD GASES W/O2 SATURATION: CPT

## 2020-01-01 PROCEDURE — 92523 SPEECH SOUND LANG COMPREHEN: CPT

## 2020-01-01 PROCEDURE — 36430 TRANSFUSION BLD/BLD COMPNT: CPT

## 2020-01-01 PROCEDURE — 85045 AUTOMATED RETICULOCYTE COUNT: CPT | Performed by: NURSE PRACTITIONER

## 2020-01-01 PROCEDURE — 93010 ELECTROCARDIOGRAM REPORT: CPT | Performed by: INTERNAL MEDICINE

## 2020-01-01 PROCEDURE — 86923 COMPATIBILITY TEST ELECTRIC: CPT

## 2020-01-01 PROCEDURE — 99232 SBSQ HOSP IP/OBS MODERATE 35: CPT | Performed by: FAMILY MEDICINE

## 2020-01-01 PROCEDURE — 99223 1ST HOSP IP/OBS HIGH 75: CPT | Performed by: INTERNAL MEDICINE

## 2020-01-01 PROCEDURE — 84466 ASSAY OF TRANSFERRIN: CPT | Performed by: NURSE PRACTITIONER

## 2020-01-01 PROCEDURE — P9016 RBC LEUKOCYTES REDUCED: HCPCS

## 2020-01-01 PROCEDURE — 36600 WITHDRAWAL OF ARTERIAL BLOOD: CPT

## 2020-01-01 PROCEDURE — 25010000002 FUROSEMIDE PER 20 MG: Performed by: NURSE PRACTITIONER

## 2020-01-01 PROCEDURE — 25010000002 HYDROMORPHONE 1 MG/ML SOLUTION: Performed by: INTERNAL MEDICINE

## 2020-01-01 PROCEDURE — 83605 ASSAY OF LACTIC ACID: CPT | Performed by: INTERNAL MEDICINE

## 2020-01-01 PROCEDURE — 85025 COMPLETE CBC W/AUTO DIFF WBC: CPT | Performed by: NURSE PRACTITIONER

## 2020-01-01 PROCEDURE — 80048 BASIC METABOLIC PNL TOTAL CA: CPT | Performed by: NURSE PRACTITIONER

## 2020-01-01 PROCEDURE — 85610 PROTHROMBIN TIME: CPT | Performed by: NURSE PRACTITIONER

## 2020-01-01 RX ORDER — FUROSEMIDE 10 MG/ML
40 INJECTION INTRAMUSCULAR; INTRAVENOUS ONCE
Status: COMPLETED | OUTPATIENT
Start: 2020-01-01 | End: 2020-01-01

## 2020-01-01 RX ORDER — HYDROMORPHONE HYDROCHLORIDE 1 MG/ML
0.25 INJECTION, SOLUTION INTRAMUSCULAR; INTRAVENOUS; SUBCUTANEOUS EVERY 8 HOURS
Status: DISCONTINUED | OUTPATIENT
Start: 2020-01-01 | End: 2020-01-01

## 2020-01-01 RX ORDER — MORPHINE SULFATE 2 MG/ML
2 INJECTION, SOLUTION INTRAMUSCULAR; INTRAVENOUS
Status: CANCELLED | OUTPATIENT
Start: 2020-01-01 | End: 2020-02-20

## 2020-01-01 RX ORDER — ATORVASTATIN CALCIUM 40 MG/1
80 TABLET, FILM COATED ORAL NIGHTLY
Status: CANCELLED | OUTPATIENT
Start: 2020-01-01

## 2020-01-01 RX ORDER — LORAZEPAM 2 MG/ML
0.5 INJECTION INTRAMUSCULAR EVERY 4 HOURS PRN
Status: DISCONTINUED | OUTPATIENT
Start: 2020-01-01 | End: 2020-01-01 | Stop reason: HOSPADM

## 2020-01-01 RX ORDER — LORAZEPAM 2 MG/ML
1 INJECTION INTRAMUSCULAR
Status: DISCONTINUED | OUTPATIENT
Start: 2020-01-01 | End: 2020-01-01 | Stop reason: HOSPADM

## 2020-01-01 RX ORDER — HYDROMORPHONE HYDROCHLORIDE 1 MG/ML
0.25 INJECTION, SOLUTION INTRAMUSCULAR; INTRAVENOUS; SUBCUTANEOUS EVERY 6 HOURS SCHEDULED
Status: DISCONTINUED | OUTPATIENT
Start: 2020-01-01 | End: 2020-01-01

## 2020-01-01 RX ORDER — KETOROLAC TROMETHAMINE 15 MG/ML
15 INJECTION, SOLUTION INTRAMUSCULAR; INTRAVENOUS EVERY 6 HOURS PRN
Status: DISCONTINUED | OUTPATIENT
Start: 2020-01-01 | End: 2020-01-01 | Stop reason: HOSPADM

## 2020-01-01 RX ORDER — ONDANSETRON 2 MG/ML
4 INJECTION INTRAMUSCULAR; INTRAVENOUS ONCE
Status: COMPLETED | OUTPATIENT
Start: 2020-01-01 | End: 2020-01-01

## 2020-01-01 RX ORDER — HYDROMORPHONE HYDROCHLORIDE 1 MG/ML
0.5 INJECTION, SOLUTION INTRAMUSCULAR; INTRAVENOUS; SUBCUTANEOUS
Status: DISCONTINUED | OUTPATIENT
Start: 2020-01-01 | End: 2020-01-01 | Stop reason: HOSPADM

## 2020-01-01 RX ORDER — LORAZEPAM 2 MG/ML
0.5 INJECTION INTRAMUSCULAR EVERY 4 HOURS PRN
Status: CANCELLED | OUTPATIENT
Start: 2020-01-01 | End: 2020-02-19

## 2020-01-01 RX ORDER — HYDROMORPHONE HYDROCHLORIDE 1 MG/ML
0.5 INJECTION, SOLUTION INTRAMUSCULAR; INTRAVENOUS; SUBCUTANEOUS
Status: DISCONTINUED | OUTPATIENT
Start: 2020-01-01 | End: 2020-01-01

## 2020-01-01 RX ORDER — ONDANSETRON 2 MG/ML
4 INJECTION INTRAMUSCULAR; INTRAVENOUS EVERY 6 HOURS PRN
Status: CANCELLED | OUTPATIENT
Start: 2020-01-01

## 2020-01-01 RX ORDER — SODIUM CHLORIDE 0.9 % (FLUSH) 0.9 %
10 SYRINGE (ML) INJECTION AS NEEDED
Status: DISCONTINUED | OUTPATIENT
Start: 2020-01-01 | End: 2020-01-01 | Stop reason: HOSPADM

## 2020-01-01 RX ORDER — ATORVASTATIN CALCIUM 40 MG/1
80 TABLET, FILM COATED ORAL NIGHTLY
Status: DISCONTINUED | OUTPATIENT
Start: 2020-01-01 | End: 2020-01-01 | Stop reason: HOSPADM

## 2020-01-01 RX ORDER — LATANOPROST 50 UG/ML
1 SOLUTION/ DROPS OPHTHALMIC NIGHTLY PRN
Status: DISCONTINUED | OUTPATIENT
Start: 2020-01-01 | End: 2020-01-01 | Stop reason: HOSPADM

## 2020-01-01 RX ORDER — ACETAMINOPHEN 650 MG/1
650 SUPPOSITORY RECTAL EVERY 4 HOURS PRN
Status: DISCONTINUED | OUTPATIENT
Start: 2020-01-01 | End: 2020-01-01 | Stop reason: HOSPADM

## 2020-01-01 RX ORDER — GABAPENTIN 100 MG/1
100 CAPSULE ORAL 3 TIMES DAILY
Status: CANCELLED | OUTPATIENT
Start: 2020-01-01

## 2020-01-01 RX ORDER — ACETAMINOPHEN 650 MG/1
650 SUPPOSITORY RECTAL EVERY 4 HOURS PRN
Status: CANCELLED | OUTPATIENT
Start: 2020-01-01

## 2020-01-01 RX ORDER — LATANOPROST 50 UG/ML
1 SOLUTION/ DROPS OPHTHALMIC NIGHTLY
Status: DISCONTINUED | OUTPATIENT
Start: 2020-01-01 | End: 2020-01-01

## 2020-01-01 RX ORDER — ACETAMINOPHEN 160 MG/5ML
650 SOLUTION ORAL EVERY 4 HOURS PRN
Status: DISCONTINUED | OUTPATIENT
Start: 2020-01-01 | End: 2020-01-01 | Stop reason: HOSPADM

## 2020-01-01 RX ORDER — HALOPERIDOL 5 MG/ML
1 INJECTION INTRAMUSCULAR EVERY 4 HOURS PRN
Status: DISCONTINUED | OUTPATIENT
Start: 2020-01-01 | End: 2020-01-01 | Stop reason: HOSPADM

## 2020-01-01 RX ORDER — SODIUM CHLORIDE 0.9 % (FLUSH) 0.9 %
10 SYRINGE (ML) INJECTION EVERY 12 HOURS SCHEDULED
Status: DISCONTINUED | OUTPATIENT
Start: 2020-01-01 | End: 2020-01-01

## 2020-01-01 RX ORDER — FLUTICASONE PROPIONATE 50 MCG
2 SPRAY, SUSPENSION (ML) NASAL DAILY PRN
Status: DISCONTINUED | OUTPATIENT
Start: 2020-01-01 | End: 2020-01-01 | Stop reason: HOSPADM

## 2020-01-01 RX ORDER — SCOLOPAMINE TRANSDERMAL SYSTEM 1 MG/1
1 PATCH, EXTENDED RELEASE TRANSDERMAL
Status: DISCONTINUED | OUTPATIENT
Start: 2020-01-01 | End: 2020-01-01 | Stop reason: HOSPADM

## 2020-01-01 RX ORDER — SODIUM CHLORIDE 0.9 % (FLUSH) 0.9 %
10 SYRINGE (ML) INJECTION AS NEEDED
Status: CANCELLED | OUTPATIENT
Start: 2020-01-01

## 2020-01-01 RX ORDER — LORAZEPAM 2 MG/ML
0.5 INJECTION INTRAMUSCULAR EVERY 6 HOURS
Status: DISCONTINUED | OUTPATIENT
Start: 2020-01-01 | End: 2020-01-01

## 2020-01-01 RX ORDER — LORAZEPAM 2 MG/ML
0.5 INJECTION INTRAMUSCULAR
Status: DISCONTINUED | OUTPATIENT
Start: 2020-01-01 | End: 2020-01-01 | Stop reason: HOSPADM

## 2020-01-01 RX ORDER — ONDANSETRON 4 MG/1
4 TABLET, FILM COATED ORAL EVERY 6 HOURS PRN
Status: DISCONTINUED | OUTPATIENT
Start: 2020-01-01 | End: 2020-01-01 | Stop reason: HOSPADM

## 2020-01-01 RX ORDER — LATANOPROST 50 UG/ML
1 SOLUTION/ DROPS OPHTHALMIC NIGHTLY
Status: CANCELLED | OUTPATIENT
Start: 2020-01-01

## 2020-01-01 RX ORDER — ONDANSETRON 4 MG/1
4 TABLET, FILM COATED ORAL EVERY 6 HOURS PRN
Status: CANCELLED | OUTPATIENT
Start: 2020-01-01

## 2020-01-01 RX ORDER — GABAPENTIN 100 MG/1
100 CAPSULE ORAL 3 TIMES DAILY
Status: DISCONTINUED | OUTPATIENT
Start: 2020-01-01 | End: 2020-01-01 | Stop reason: HOSPADM

## 2020-01-01 RX ORDER — GABAPENTIN 300 MG/1
300 CAPSULE ORAL 3 TIMES DAILY
COMMUNITY

## 2020-01-01 RX ORDER — NITROGLYCERIN 20 MG/100ML
5-200 INJECTION INTRAVENOUS
Status: DISCONTINUED | OUTPATIENT
Start: 2020-01-01 | End: 2020-01-01

## 2020-01-01 RX ORDER — BISACODYL 10 MG
10 SUPPOSITORY, RECTAL RECTAL DAILY PRN
Status: DISCONTINUED | OUTPATIENT
Start: 2020-01-01 | End: 2020-01-01 | Stop reason: HOSPADM

## 2020-01-01 RX ORDER — SODIUM CHLORIDE 0.9 % (FLUSH) 0.9 %
10 SYRINGE (ML) INJECTION EVERY 12 HOURS SCHEDULED
Status: DISCONTINUED | OUTPATIENT
Start: 2020-01-01 | End: 2020-01-01 | Stop reason: HOSPADM

## 2020-01-01 RX ORDER — ATENOLOL 50 MG/1
50 TABLET ORAL DAILY
Status: DISCONTINUED | OUTPATIENT
Start: 2020-01-01 | End: 2020-01-01

## 2020-01-01 RX ORDER — CHOLECALCIFEROL (VITAMIN D3) 125 MCG
5 CAPSULE ORAL NIGHTLY PRN
Status: CANCELLED | OUTPATIENT
Start: 2020-01-01

## 2020-01-01 RX ORDER — FLUTICASONE PROPIONATE 50 MCG
2 SPRAY, SUSPENSION (ML) NASAL DAILY PRN
Status: CANCELLED | OUTPATIENT
Start: 2020-01-01

## 2020-01-01 RX ORDER — ACETAMINOPHEN 160 MG/5ML
650 SOLUTION ORAL EVERY 4 HOURS PRN
Status: CANCELLED | OUTPATIENT
Start: 2020-01-01

## 2020-01-01 RX ORDER — SENNOSIDES 8.6 MG
650 CAPSULE ORAL EVERY 8 HOURS PRN
COMMUNITY

## 2020-01-01 RX ORDER — GABAPENTIN 300 MG/1
300 CAPSULE ORAL 3 TIMES DAILY
Status: DISCONTINUED | OUTPATIENT
Start: 2020-01-01 | End: 2020-01-01

## 2020-01-01 RX ORDER — HYDROMORPHONE HYDROCHLORIDE 1 MG/ML
0.25 INJECTION, SOLUTION INTRAMUSCULAR; INTRAVENOUS; SUBCUTANEOUS EVERY 6 HOURS
Status: DISCONTINUED | OUTPATIENT
Start: 2020-01-01 | End: 2020-01-01

## 2020-01-01 RX ORDER — GLYCOPYRROLATE 0.2 MG/ML
0.4 INJECTION INTRAMUSCULAR; INTRAVENOUS EVERY 6 HOURS PRN
Status: DISCONTINUED | OUTPATIENT
Start: 2020-01-01 | End: 2020-01-01 | Stop reason: HOSPADM

## 2020-01-01 RX ORDER — CHOLECALCIFEROL (VITAMIN D3) 125 MCG
5 CAPSULE ORAL NIGHTLY PRN
Status: DISCONTINUED | OUTPATIENT
Start: 2020-01-01 | End: 2020-01-01 | Stop reason: HOSPADM

## 2020-01-01 RX ORDER — PANTOPRAZOLE SODIUM 40 MG/10ML
40 INJECTION, POWDER, LYOPHILIZED, FOR SOLUTION INTRAVENOUS EVERY 12 HOURS SCHEDULED
Status: DISCONTINUED | OUTPATIENT
Start: 2020-01-01 | End: 2020-01-01 | Stop reason: HOSPADM

## 2020-01-01 RX ORDER — ATENOLOL 50 MG/1
50 TABLET ORAL
Status: CANCELLED | OUTPATIENT
Start: 2020-01-01

## 2020-01-01 RX ORDER — LORAZEPAM 2 MG/ML
0.5 INJECTION INTRAMUSCULAR EVERY 6 HOURS
Status: DISCONTINUED | OUTPATIENT
Start: 2020-01-01 | End: 2020-01-01 | Stop reason: HOSPADM

## 2020-01-01 RX ORDER — ONDANSETRON 2 MG/ML
4 INJECTION INTRAMUSCULAR; INTRAVENOUS EVERY 6 HOURS PRN
Status: DISCONTINUED | OUTPATIENT
Start: 2020-01-01 | End: 2020-01-01 | Stop reason: HOSPADM

## 2020-01-01 RX ORDER — LORAZEPAM 2 MG/ML
0.5 INJECTION INTRAMUSCULAR EVERY 12 HOURS
Status: DISCONTINUED | OUTPATIENT
Start: 2020-01-01 | End: 2020-01-01

## 2020-01-01 RX ORDER — ATENOLOL 50 MG/1
50 TABLET ORAL
Status: DISCONTINUED | OUTPATIENT
Start: 2020-01-01 | End: 2020-01-01 | Stop reason: HOSPADM

## 2020-01-01 RX ORDER — SODIUM CHLORIDE 0.9 % (FLUSH) 0.9 %
10 SYRINGE (ML) INJECTION EVERY 12 HOURS SCHEDULED
Status: CANCELLED | OUTPATIENT
Start: 2020-01-01

## 2020-01-01 RX ORDER — ACETAMINOPHEN 325 MG/1
650 TABLET ORAL EVERY 4 HOURS PRN
Status: CANCELLED | OUTPATIENT
Start: 2020-01-01

## 2020-01-01 RX ORDER — LATANOPROST 50 UG/ML
1 SOLUTION/ DROPS OPHTHALMIC NIGHTLY
Status: DISCONTINUED | OUTPATIENT
Start: 2020-01-01 | End: 2020-01-01 | Stop reason: HOSPADM

## 2020-01-01 RX ORDER — MORPHINE SULFATE 2 MG/ML
2 INJECTION, SOLUTION INTRAMUSCULAR; INTRAVENOUS
Status: DISCONTINUED | OUTPATIENT
Start: 2020-01-01 | End: 2020-01-01 | Stop reason: HOSPADM

## 2020-01-01 RX ORDER — HYDROMORPHONE HYDROCHLORIDE 1 MG/ML
0.5 INJECTION, SOLUTION INTRAMUSCULAR; INTRAVENOUS; SUBCUTANEOUS EVERY 6 HOURS SCHEDULED
Status: DISCONTINUED | OUTPATIENT
Start: 2020-01-01 | End: 2020-01-01 | Stop reason: HOSPADM

## 2020-01-01 RX ORDER — PANTOPRAZOLE SODIUM 40 MG/10ML
40 INJECTION, POWDER, LYOPHILIZED, FOR SOLUTION INTRAVENOUS EVERY 12 HOURS SCHEDULED
Status: CANCELLED | OUTPATIENT
Start: 2020-01-01

## 2020-01-01 RX ORDER — ACETAMINOPHEN 325 MG/1
650 TABLET ORAL EVERY 4 HOURS PRN
Status: DISCONTINUED | OUTPATIENT
Start: 2020-01-01 | End: 2020-01-01 | Stop reason: HOSPADM

## 2020-01-01 RX ORDER — HYDROMORPHONE HYDROCHLORIDE 1 MG/ML
0.25 INJECTION, SOLUTION INTRAMUSCULAR; INTRAVENOUS; SUBCUTANEOUS
Status: DISCONTINUED | OUTPATIENT
Start: 2020-01-01 | End: 2020-01-01

## 2020-01-01 RX ADMIN — LORAZEPAM 0.5 MG: 2 INJECTION INTRAMUSCULAR; INTRAVENOUS at 17:02

## 2020-01-01 RX ADMIN — NITROGLYCERIN 5 MCG/MIN: 20 INJECTION INTRAVENOUS at 21:31

## 2020-01-01 RX ADMIN — KETOROLAC TROMETHAMINE 15 MG: 15 INJECTION, SOLUTION INTRAMUSCULAR; INTRAVENOUS at 12:30

## 2020-01-01 RX ADMIN — FUROSEMIDE 40 MG: 10 INJECTION, SOLUTION INTRAMUSCULAR; INTRAVENOUS at 00:54

## 2020-01-01 RX ADMIN — HYDROMORPHONE HYDROCHLORIDE 0.5 MG: 1 INJECTION, SOLUTION INTRAMUSCULAR; INTRAVENOUS; SUBCUTANEOUS at 11:10

## 2020-01-01 RX ADMIN — HYDROMORPHONE HYDROCHLORIDE 0.25 MG: 1 INJECTION, SOLUTION INTRAMUSCULAR; INTRAVENOUS; SUBCUTANEOUS at 21:17

## 2020-01-01 RX ADMIN — LORAZEPAM 0.5 MG: 2 INJECTION INTRAMUSCULAR; INTRAVENOUS at 13:51

## 2020-01-01 RX ADMIN — LORAZEPAM 0.5 MG: 2 INJECTION INTRAMUSCULAR; INTRAVENOUS at 11:10

## 2020-01-01 RX ADMIN — LORAZEPAM 0.5 MG: 2 INJECTION INTRAMUSCULAR; INTRAVENOUS at 09:28

## 2020-01-01 RX ADMIN — MORPHINE SULFATE 2 MG: 2 INJECTION, SOLUTION INTRAMUSCULAR; INTRAVENOUS at 10:27

## 2020-01-01 RX ADMIN — HYDROMORPHONE HYDROCHLORIDE 1 MG: 1 INJECTION, SOLUTION INTRAMUSCULAR; INTRAVENOUS; SUBCUTANEOUS at 19:59

## 2020-01-01 RX ADMIN — PANTOPRAZOLE SODIUM 40 MG: 40 INJECTION, POWDER, FOR SOLUTION INTRAVENOUS at 21:28

## 2020-01-01 RX ADMIN — BISACODYL 10 MG: 10 SUPPOSITORY RECTAL at 09:58

## 2020-01-01 RX ADMIN — HYDROMORPHONE HYDROCHLORIDE 0.5 MG: 1 INJECTION, SOLUTION INTRAMUSCULAR; INTRAVENOUS; SUBCUTANEOUS at 17:03

## 2020-01-01 RX ADMIN — HYDROMORPHONE HYDROCHLORIDE 0.25 MG: 1 INJECTION, SOLUTION INTRAMUSCULAR; INTRAVENOUS; SUBCUTANEOUS at 14:09

## 2020-01-01 RX ADMIN — HYDROMORPHONE HYDROCHLORIDE 0.25 MG: 1 INJECTION, SOLUTION INTRAMUSCULAR; INTRAVENOUS; SUBCUTANEOUS at 05:30

## 2020-01-01 RX ADMIN — HYDROMORPHONE HYDROCHLORIDE 0.25 MG: 1 INJECTION, SOLUTION INTRAMUSCULAR; INTRAVENOUS; SUBCUTANEOUS at 05:38

## 2020-01-01 RX ADMIN — HYDROMORPHONE HYDROCHLORIDE 0.25 MG: 1 INJECTION, SOLUTION INTRAMUSCULAR; INTRAVENOUS; SUBCUTANEOUS at 02:26

## 2020-01-01 RX ADMIN — PANTOPRAZOLE SODIUM 40 MG: 40 INJECTION, POWDER, FOR SOLUTION INTRAVENOUS at 10:24

## 2020-01-01 RX ADMIN — LORAZEPAM 0.5 MG: 2 INJECTION INTRAMUSCULAR; INTRAVENOUS at 07:11

## 2020-01-01 RX ADMIN — ONDANSETRON 4 MG: 2 INJECTION INTRAMUSCULAR; INTRAVENOUS at 00:54

## 2020-01-01 RX ADMIN — LATANOPROST 1 DROP: 50 SOLUTION OPHTHALMIC at 00:02

## 2020-01-01 RX ADMIN — LORAZEPAM 0.5 MG: 2 INJECTION INTRAMUSCULAR; INTRAVENOUS at 10:19

## 2020-01-01 RX ADMIN — HYDROMORPHONE HYDROCHLORIDE 0.5 MG: 1 INJECTION, SOLUTION INTRAMUSCULAR; INTRAVENOUS; SUBCUTANEOUS at 10:03

## 2020-01-01 RX ADMIN — LORAZEPAM 0.5 MG: 2 INJECTION INTRAMUSCULAR; INTRAVENOUS at 09:24

## 2020-01-01 RX ADMIN — KETOROLAC TROMETHAMINE 15 MG: 15 INJECTION, SOLUTION INTRAMUSCULAR; INTRAVENOUS at 21:38

## 2020-01-01 RX ADMIN — LATANOPROST 1 DROP: 50 SOLUTION/ DROPS OPHTHALMIC at 05:34

## 2020-01-01 RX ADMIN — LORAZEPAM 0.5 MG: 2 INJECTION INTRAMUSCULAR; INTRAVENOUS at 17:13

## 2020-01-01 RX ADMIN — HYDROMORPHONE HYDROCHLORIDE 0.25 MG: 1 INJECTION, SOLUTION INTRAMUSCULAR; INTRAVENOUS; SUBCUTANEOUS at 13:02

## 2020-01-01 RX ADMIN — SODIUM CHLORIDE, PRESERVATIVE FREE 10 ML: 5 INJECTION INTRAVENOUS at 00:54

## 2020-01-01 RX ADMIN — HYDROMORPHONE HYDROCHLORIDE 0.5 MG: 1 INJECTION, SOLUTION INTRAMUSCULAR; INTRAVENOUS; SUBCUTANEOUS at 00:05

## 2020-01-01 RX ADMIN — ACETAMINOPHEN 650 MG: 325 TABLET, FILM COATED ORAL at 03:53

## 2020-01-01 RX ADMIN — SODIUM CHLORIDE, PRESERVATIVE FREE 10 ML: 5 INJECTION INTRAVENOUS at 21:29

## 2020-01-01 RX ADMIN — LORAZEPAM 0.5 MG: 2 INJECTION INTRAMUSCULAR; INTRAVENOUS at 00:35

## 2020-01-01 RX ADMIN — LORAZEPAM 0.5 MG: 2 INJECTION INTRAMUSCULAR; INTRAVENOUS at 10:04

## 2020-01-01 RX ADMIN — ATENOLOL 50 MG: 50 TABLET ORAL at 12:13

## 2020-01-01 RX ADMIN — LORAZEPAM 0.5 MG: 2 INJECTION INTRAMUSCULAR; INTRAVENOUS at 22:46

## 2020-01-01 RX ADMIN — LORAZEPAM 0.5 MG: 2 INJECTION INTRAMUSCULAR; INTRAVENOUS at 21:02

## 2020-01-01 RX ADMIN — HALOPERIDOL LACTATE 1 MG: 5 INJECTION INTRAMUSCULAR at 21:37

## 2020-01-01 RX ADMIN — HYDROMORPHONE HYDROCHLORIDE 0.5 MG: 1 INJECTION, SOLUTION INTRAMUSCULAR; INTRAVENOUS; SUBCUTANEOUS at 02:00

## 2020-01-01 RX ADMIN — KETOROLAC TROMETHAMINE 15 MG: 15 INJECTION, SOLUTION INTRAMUSCULAR; INTRAVENOUS at 05:31

## 2020-01-01 RX ADMIN — HYDROMORPHONE HYDROCHLORIDE 0.25 MG: 1 INJECTION, SOLUTION INTRAMUSCULAR; INTRAVENOUS; SUBCUTANEOUS at 20:59

## 2020-01-01 RX ADMIN — ACETAMINOPHEN 650 MG: 650 SOLUTION ORAL at 10:02

## 2020-01-01 RX ADMIN — HYDROMORPHONE HYDROCHLORIDE 0.25 MG: 1 INJECTION, SOLUTION INTRAMUSCULAR; INTRAVENOUS; SUBCUTANEOUS at 08:23

## 2020-01-01 RX ADMIN — LATANOPROST 1 DROP: 50 SOLUTION OPHTHALMIC at 22:47

## 2020-01-01 RX ADMIN — LORAZEPAM 0.5 MG: 2 INJECTION INTRAMUSCULAR; INTRAVENOUS at 21:19

## 2020-01-01 RX ADMIN — LORAZEPAM 0.5 MG: 2 INJECTION INTRAMUSCULAR; INTRAVENOUS at 08:12

## 2020-01-01 RX ADMIN — HYDROMORPHONE HYDROCHLORIDE 0.25 MG: 1 INJECTION, SOLUTION INTRAMUSCULAR; INTRAVENOUS; SUBCUTANEOUS at 19:58

## 2020-01-01 RX ADMIN — LORAZEPAM 0.5 MG: 2 INJECTION INTRAMUSCULAR; INTRAVENOUS at 04:52

## 2020-01-01 RX ADMIN — HYDROMORPHONE HYDROCHLORIDE 0.5 MG: 1 INJECTION, SOLUTION INTRAMUSCULAR; INTRAVENOUS; SUBCUTANEOUS at 08:12

## 2020-02-08 PROBLEM — J90 PLEURAL EFFUSION: Status: ACTIVE | Noted: 2020-01-01

## 2020-02-08 PROBLEM — I10 ESSENTIAL HYPERTENSION: Status: ACTIVE | Noted: 2020-01-01

## 2020-02-09 PROBLEM — R41.82 AMS (ALTERED MENTAL STATUS): Status: ACTIVE | Noted: 2020-01-01

## 2020-02-10 PROBLEM — J96.90 RESPIRATORY FAILURE (HCC): Status: ACTIVE | Noted: 2020-01-01

## 2020-02-10 NOTE — PLAN OF CARE
This AM patient was very uncomfortable and agitated gave her PRN Ativan and Morphine. It didn't take her long to get comfortable after that. She is now resting comfortably with 1L NC. Family has been at bedside most of the day. Will continue to monitor.

## 2020-02-11 NOTE — PROGRESS NOTES
Clinical Nutrition       Patient Name: Bettye Sargent  Date of Encounter: 02/11/20 9:00 AM  MRN: [id number]  Admission date: 2/10/2020      Reason for visit: MDR. RD to continue to follow per protocol.     Additional information obtained via EMR: RD notes pt now admitted to in patient hospice.     Current diet: Diet Soft Texture; Chopped; Thin    EMR reviewed     Intervention:  Follow treatment plan  Care plan reviewed    Follow up:   RDN will sign off, remains available PRN      Diane Cardoza RDN, LD, CNSC   9:00 AM  Time: 10min

## 2020-02-11 NOTE — PLAN OF CARE
Patient resting comfortably; ojeda catheter inserted at the beginning of the shift per order; turned Q2; patient non-tele, but family still wishing to maintain supplemental oxygen; Ativan and Dilaudid given Q6 as scheduled; patient in no obvious distress; slept for most of the night; continue to monitor.

## 2020-02-11 NOTE — PROGRESS NOTES
2/11 PPS 10 % Patient is non-responsive even to stimuli. Pt looks peaceful at this time, breathe sounds coarse in upper lobes, Luque inserted and 400 cc dark urine out, No PO intake, O2 2L NC with no distress. PRN Tylenol x 1, Ativan 0.5 mg x 2, morphine 2 mg x 1 /24 hrs. given. Granddaughter was not present at the time of visit, but was in the hospital. Patient meets criteria for acute in-patient care with required nursing assessment and interventions for symptoms with IV medications.

## 2020-02-11 NOTE — PLAN OF CARE
Problem: Patient Care Overview  Goal: Plan of Care Review  Outcome: Ongoing (interventions implemented as appropriate)  Flowsheets (Taken 2/11/2020 1817)  Progress: no change  Plan of Care Reviewed With: family  Outcome Summary: non responsive this afternoon even with decrease in meds, afebrile, uop seems to be slowing down, 400 out this am. now only 50 since early this morning. family at bedside denied needs, patient appears calm and comfortable

## 2020-02-11 NOTE — H&P
Anand Akins MD - PCP    HPI:   89 y.o. F w/ hx of PVD and iliac stent, neuropathy, and shingles.  Living at [facility].  Functional decline over the course of several months, more precipitous in recent couple of weeks. Had self purchased PRN oxygen at home.      Presented to the ED on 2/8 with dyspnea.  Weak, nauseated.  Found to have bilateral pleural effusions. Anemic w/ Hgb 6.6 and BNP 56, 909, as well as significantly hypertensive at 199/87.  Echo noted mild VHD.  She was tx'ed w/ NTG drip and transfused blood.  She was awake and alert during admission, however, later on the hospital floor, she became unresponsive.  A code stroke was called. She was placed on Bipap which improved her responsiveness, however, she did not tolerate Bipap well, pulling at the mask to get it off.    After discussing goals of care with family, they elected not to pursue further aggressive w/u or tx.  They deferred paracentesis, ongoing BiPap, endoscopy, and additional blood transfusion.    Meds reviewed.  Given 40mg lasix x1 on 2/8.  PRN ativan given x4 in last 24 hrs.  PRN morphine given x1 today.    Past Medical History:   Diagnosis Date   • Impaired functional mobility, balance, gait, and endurance    • Neuropathy    • Peripheral vascular disease (CMS/HCC)    • Shingles    • Tachycardia      Past Surgical History:   Procedure Laterality Date   • FEMORAL THROMBECTOMY/EMBOLECTOMY Right 7/5/2019    Procedure: Right Femoral artery cutdown with Angioplasty and stent of the proximal right common iliac artery;  Surgeon: Candido Dillon MD;  Location: [place] [address];  Service: Cardiothoracic     Current Code Status     Date Active Code Status Order ID Comments User Context       2/10/2020 1509 No CPR [id number]  Belgica Cooley MD Inpatient       Questions for Current Code Status     Question Answer Comment    Code Status No CPR     Medical Interventions (Level of Support Prior to Arrest) Comfort  Measures     Level Of Support Discussed With Health Care Surrogate         Current Facility-Administered Medications   Medication Dose Route Frequency Provider Last Rate Last Dose   • acetaminophen (TYLENOL) suppository 650 mg  650 mg Rectal Q4H PRN Belgica Cooley MD       • bisacodyl (DULCOLAX) suppository 10 mg  10 mg Rectal Daily PRN Belgica Cooley MD       • fluticasone (FLONASE) 50 MCG/ACT nasal spray 2 spray  2 spray Each Nare Daily PRN Belgica Cooley MD       • glycopyrrolate (ROBINUL) injection 0.4 mg  0.4 mg Intravenous Q6H PRN Belgica Cooley MD       • haloperidol lactate (HALDOL) injection 1 mg  1 mg Intravenous Q4H PRN Belgica Cooley MD       • HYDROmorphone (DILAUDID) injection 0.25 mg  0.25 mg Intravenous Q1H PRN Belgica Cooley MD        Or   • HYDROmorphone (DILAUDID) injection 0.5 mg  0.5 mg Intravenous Q1H PRN Belgica Cooley MD       • HYDROmorphone (DILAUDID) injection 0.25 mg  0.25 mg Intravenous Q6H Belgica Cooley MD   0.25 mg at 02/11/20 0823   • ketorolac (TORADOL) injection 15 mg  15 mg Intravenous Q6H PRN Belgica Cooley MD       • latanoprost (XALATAN) 0.005 % ophthalmic solution 1 drop  1 drop Both Eyes Nightly Belgica Cooley MD   1 drop at 02/10/20 2247   • LORazepam (ATIVAN) injection 0.5 mg  0.5 mg Intravenous Q6H Belgica Cooley MD   0.5 mg at 02/11/20 0452   • LORazepam (ATIVAN) injection 0.5 mg  0.5 mg Intravenous Q30 Min PRN Belgica Cooley MD        Or   • LORazepam (ATIVAN) injection 1 mg  1 mg Intravenous Q3H PRN Belgica Cooley MD       • palliative care oral rinse   Mouth/Throat TID Belgica Cooley MD       • palliative care oral rinse   Mouth/Throat PRN Belgica Cooley MD       • Scopolamine (TRANSDERM-SCOP) 1.5 MG/3DAYS patch 1 patch  1 patch Transdermal Q72H PRN Belgica Cooley MD       • sodium chloride 0.9 % flush 10 mL  10 mL Intravenous PRN Belgica Cooley MD           Allergies   Allergen Reactions   • Sulfa Antibiotics Other  (See Comments)     Unknown     • Amoxicillin Rash     Family History   Problem Relation Age of Onset   • No Known Problems Mother      Social History     Socioeconomic History   • Marital status:      Spouse name: Not on file   • Number of children: 5   • Years of education: Not on file   • Highest education level: Not on file   Occupational History   • Occupation: Homemaker   Tobacco Use   • Smoking status: Former Smoker   • Smokeless tobacco: Never Used   Substance and Sexual Activity   • Alcohol use: No     Frequency: Never   • Drug use: No   Social History Narrative    Lives in [place], [place] at assisted living facility   From [place].   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  Had twin boys - both ultimately dx'ed w/ schizophrenia, both .  Older daughter Dimple; her first   with Hospice care.  Younger daughter, deaf and mute, living in a facility.  Raised granddaughter Anny.     Review of Systems - unable to obtain from patient      BP (!) 183/74 (BP Location: Right arm, Patient Position: Lying)   Pulse 85   Temp 97.5 °F (36.4 °C) (Axillary)   Resp 16   SpO2 (!) 87%     Intake/Output Summary (Last 24 hours) at 2020 0904  Last data filed at 2020 0829  Gross per 24 hour   Intake --   Output 400 ml   Net -400 ml     Physical Exam:  Constitutional: frail, elderly F; in bed, NAD  Head/Neck: NC/AT, trachea midline  Eyes: closed, no edema  ENT: patent nares, mucous membranes moist  Respiratory: Clear, respiratory effort normal   Cardiovascular: RRR, + murmur  Gastrointestinal: soft, nontender, nondistended  Extremities: No edema or cyanosis  Psychiatric: Calm, face relaxed  Neurologic: Not awake or alert, face symmetric, no myoclonus  Skin: Warm, dry, pale    Results from last 7 days   Lab Units 20  1125 20  0307   WBC 10*3/mm3  --  9.52   HEMOGLOBIN g/dL 9.6* 6.6*   HEMATOCRIT % 33.7* 26.6*   PLATELETS 10*3/mm3  --  313     Results from last 7 days    Lab Units 02/09/20  0307 02/08/20  1704   SODIUM mmol/L 142 144   POTASSIUM mmol/L 4.9 4.8   CHLORIDE mmol/L 110* 109*   CO2 mmol/L 19.0* 23.0   BUN mg/dL 49* 45*   CREATININE mg/dL 1.90* 2.02*   CALCIUM mg/dL 9.0 9.6   BILIRUBIN mg/dL  --  1.1   ALK PHOS U/L  --  101   ALT (SGPT) U/L  --  17   AST (SGOT) U/L  --  18   GLUCOSE mg/dL 117* 132*     Results from last 7 days   Lab Units 02/09/20  0307   SODIUM mmol/L 142   POTASSIUM mmol/L 4.9   CHLORIDE mmol/L 110*   CO2 mmol/L 19.0*   BUN mg/dL 49*   CREATININE mg/dL 1.90*   GLUCOSE mg/dL 117*   CALCIUM mg/dL 9.0     Imaging Results (All)      Procedure Component Value Units Date/Time     XR Chest 1 View [[id number]] Collected:  02/08/20 1812       Updated:  02/10/20 1827     Narrative:           EXAMINATION: XR CHEST 1 VW - 02/08/2020     INDICATION: Shortness of air.     COMPARISON: 01/27/2020     FINDINGS: Portable chest reveals ill-defined opacification seen at the  right lung base with small right pleural effusion. Heart is enlarged.  Prominence of the pulmonary vascularity. Degenerative changes seen  within the spine. The left lung is grossly clear.            Impression:        Prominence of the pulmonary vascularity however somewhat  improved when compared to the prior study. There is a small right  pleural effusion with mild increased markings at the right lung base.     DICTATED:   02/08/2020  EDITED/ls :   02/09/2020      This report was finalized on 2/10/2020 6:24 PM by Dr. Rosalina Alejandro MD.        MRI Brain Without Contrast [[id number]] Collected:  02/09/20 0556       Updated:  02/09/20 0558     Narrative:        MRI Brain WO     INDICATION:   89-year-old female with stroke symptoms. Weakness nausea and nonproductive cough.     TECHNIQUE:   MRI of the brain without IV contrast.     COMPARISON:    CT brain 0320 hours     FINDINGS:  There is extensive motion artifact. These were apparently the best images possible according to the  technologist.     Diffusion-weighted images demonstrate no evidence of recent stroke. Midline structures appear intact. No significant tonsillar ectopia. There is generalized cerebral atrophy. Compensatory prominence of ventricular systems without distinct hydrocephalus.  No midline shift. No gross hemorrhage. Increased FLAIR and T2 signal in the periventricular and deep white matter is suggestive of chronic ischemic small vessel disease. There are flow voids present in the major vessels at the base of the brain. There is  right maxillary sinus disease. Globes intact.        Impression:           1. Motion degraded study demonstrates no MRI evidence of recent stroke.  2. Atrophy and probable chronic ischemic changes.     Signer Name: Isaac Brown MD   Signed: 2/9/2020 5:56 AM   Workstation Name: [facility]    [facility]     CT Head Without Contrast [[id number]] Collected:  02/09/20 0328       Updated:  02/09/20 0330     Narrative:        CT Head WO     HISTORY:   Weakness and slurred speech. Stroke. Pleural effusion. Hypertensive urgency.     TECHNIQUE:   Axial unenhanced head CT. Radiation dose reduction techniques included automated exposure control or exposure modulation based on body size. Count of known CT and cardiac nuc med studies performed in previous 12 months: 3.      Time of scan: 0317 hours     COMPARISON:   10/20/2019     FINDINGS:   Motion degradation. No intracranial hemorrhage, mass, or infarct. No hydrocephalus or extra-axial fluid collection. There are senescent changes, including volume loss and nonspecific white matter change, but no acute abnormality is seen. The skull base,  calvarium, and extracranial soft tissues are normal.        Impression:        Senescent changes without acute abnormality.     NOTIFICATION: Critical Value/emergent results were called by telephone at the time of interpretation on 2/9/2020 3:25 AM to CT technologistKim MD who verbally  acknowledged these results and indicated she would notify the primary care team per  protocol.     Signer Name: Isaac Brown MD   Signed: 2/9/2020 3:28 AM   Workstation Name: [email or web address]    [facility]     CT Abdomen Pelvis Without Contrast [[id number]] Collected:  02/08/20 1931       Updated:  02/08/20 1933     Narrative:        CT Abdomen Pelvis WO     INDICATION:   Abdominal pain with shortness of air and anemia.     TECHNIQUE:   CT of the abdomen and pelvis without IV contrast. Coronal and sagittal reconstructions were obtained.  Radiation dose reduction techniques included automated exposure control or exposure modulation based on body size. Count of known CT and cardiac nuc  med studies performed in previous 12 months: 1.      COMPARISON:   None available.     FINDINGS:  Abdomen: Bilateral pleural effusions right greater than left with a right-sided effusion layering to death of over 8 cm and the left sided effusion layering to a depth of 2.2 cm. Compressive atelectasis within both lung bases. Cardiomegaly. Liver and  spleen unremarkable. Gallbladder contracted. Pancreas, kidneys and adrenal glands are unremarkable. Extensive diverticulosis involving the transverse descending and sigmoid colon. No acute diverticulitis. The appendix is normal. Extensive aortic and  iliac atherosclerotic disease. Mild aneurysmal dilatation of the infrarenal abdominal aorta 3.1 cm. Suspected right iliac to femoral graft.     Pelvis: Bladder is decompressed. Uterus unremarkable. Osteopenia with diffuse degenerative changes throughout the spine. Incidentally noted is a step-off in the upper sternum which is felt to be artifactual due to motion artifact.        Impression:        Bilateral pleural effusions with a large right pleural effusion and a small left pleural effusion. Compressive atelectasis.     No acute abdominal or pelvic pathology.     Extensive aortic iliac atherosclerotic disease. Changes on  the right common iliac and common femoral vessels suggest prior grafting. Correlate with surgical history.     Extensive colonic diverticulosis without diverticulitis.              Signer Name: DAVE Roldan MD   Signed: 2/8/2020 7:31 PM   Workstation Name: [email or web address]    [facility]     CT Chest Without Contrast [[id number]] Collected:  02/08/20 1922       Updated:  02/08/20 1924     Narrative:        CT Chest WO     INDICATION:   Shortness of breath on arrival     TECHNIQUE:   CT of the thorax without IV contrast. Coronal and sagittal reconstructions were obtained.  Radiation dose reduction techniques included automated exposure control or exposure modulation based on body size. Count of known CT and cardiac nuc med studies  performed in previous 12 months: 3.      COMPARISON:   None available.     FINDINGS:  Chest images at mediastinal window show bilateral pleural effusions, moderate on the right and small on the left. No pericardial fluid. Cardiomegaly is noted. The left atrium in particular is enlarged. No adenopathy noted.     Chest images at lung window show compressive atelectasis in the lower lung fields from the effusions. No suspicious masses or infiltrates are seen otherwise.        Impression:        Bilateral pleural effusions are noted, larger on the right than on the left. Compressive atelectasis in both lower lung fields. Cardiomegaly.     Signer Name: Candido Hudson MD   Signed: 2/8/2020 7:22 PM   Workstation Name: [email or web address]    [facility]                  Results for orders placed during the hospital encounter of 07/05/19   Duplex Lower Extremity Art / Grafts - Right CAR     Narrative · Occluded right mid SFA, reconstitutes in the distal popliteal via   collateral flow.  · At least 2 vessel flow to the foot is present.  · Heavily calcified right common femoral artery with markedly elevated   velocity at 351 cm/s.                 Results for  orders placed during the hospital encounter of 07/05/19   Duplex Lower Extremity Art / Grafts - Right CAR     Narrative · Occluded right mid SFA, reconstitutes in the distal popliteal via   collateral flow.  · At least 2 vessel flow to the foot is present.  · Heavily calcified right common femoral artery with markedly elevated   velocity at 351 cm/s.                 Results for orders placed during the hospital encounter of 02/08/20   Adult Transthoracic Echo Complete W/ Cont if Necessary Per Protocol     Narrative · Estimated EF appears to be in the range of 51 - 55%.  · RVSP 35 mmHg  · Mild-to-moderate mitral valve regurgitation is present         Active Hospital Problems     Diagnosis   POA   • **Pleural effusion [J90]   Yes   • AMS (altered mental status) [R41.82]   Unknown   • Essential hypertension [I10]   Yes   • CKD (chronic kidney disease) stage 3, GFR 30-59 ml/min (CMS/HCC) [N18.3]   Yes   • Anemia [D64.9]   Yes         Impression:   89yo female with hypoxic respiratory failure; found to have bilateral pleural effusions.  BNP 56,909 and Hgb 6.6 at hospital admission w/ BP significantly elevated.    Symptoms:  Dyspnea, Hypoxia  Restlessness, Agitation  Encephalopathy  Debility    Plan:  -Admitted to inpt Hospice for comfort focused end of life care.  -Will schedule dilaudid and ativan each Q6 ATC.  -Additional PRNs available for above symptoms, as well as fever, constipation, nausea, terminal secretions, and dry mucous membranes.  -Family educated about hospice services and aware of interdisciplinary team availability.  -Prognosis of days.      Belgica Cooley MD

## 2020-02-11 NOTE — PROGRESS NOTES
Hospice Progress Note    Patient Name: Bettye Sargent   : 3/28/1927  Gender: female    Code Status: comfort measures    Date of Admission: 2/10/2020    Subjective:    89yoF admitted in Hospice 2/10 for respiratory failure.     Pt resting comfortably; undisturbed by exam. Family at bedside.     - PRNs: none since yesterday morning - needed multiple yesterday morning to get comfortable.     - BM: none documented since admission to hospital       ROS:  Review of Systems   Unable to perform ROS: Patient unresponsive       Reviewed current scheduled and prn medications for route, type, dose and frequency.     •  acetaminophen  •  bisacodyl  •  fluticasone  •  glycopyrrolate  •  haloperidol lactate  •  HYDROmorphone **OR** HYDROmorphone  •  ketorolac  •  latanoprost  •  LORazepam **OR** LORazepam  •  palliative care oral rinse  •  Scopolamine  •  sodium chloride    Objective:   BP (!) 183/74 (BP Location: Right arm, Patient Position: Lying)   Pulse 85   Temp 97.5 °F (36.4 °C) (Axillary)   Resp 16   SpO2 (!) 87%    Intake & Output (last day)       02/10 07 -  0700  07 -  0700    Urine  400    Total Output  400    Net  -400          Urine Unmeasured Occurrence 1 x         Lab Results (last 24 hours)     ** No results found for the last 24 hours. **        Imaging Results (Last 24 Hours)     ** No results found for the last 24 hours. **          PPS: Palliative Performance Scale score as of 2020, 3:04 PM is 10% based on the following measures:   Ambulation: Totally bed bound  Activity and Evidence of Disease: Unable to do any work, extensive evidence of disease  Self-Care: Total care  Intake:  Mouth care only  LOC: Drowsy or coma      Physical Exam:  Physical Exam   Constitutional: No distress.   Elderly, frail. Comfortable. Undisturbed by exam.    Eyes:   closed   Neck: No tracheal deviation present.   Cardiovascular: Normal rate.   Murmur heard.  Pulmonary/Chest: Effort normal and breath  sounds normal. No respiratory distress.   CTA anterior. Respirations nonlabored.    Abdominal: Soft. She exhibits no distension.   Hypoactive BSx4   Genitourinary:   Genitourinary Comments: Luque with straw colored output   Musculoskeletal: She exhibits no edema.   Neurological:   Does not follow commands   Skin: Skin is dry. She is not diaphoretic. There is pallor.   Psychiatric:   No facial grimacing           Respiratory failure (CMS/HCC)      Assessment/Plan:     89yoF admitted in Hospice 2/10 for respiratory failure.     - change scheduled dilaudid 0.25mg to q8h    - change scheduled ativan 0.5mg to BID    - Nursing Communication: please leave oral rinse at bedside    - change eye gtts to prn (pt did not tolerate)    - transfer order placed    Discussion at bedside with family regarding end of life s/s and symptom mgmt. Granddtr asked good questions - Discussed in Hospice criteria and thus rationale for discharge plan. Support provided. Family is planning on staying around the clock. Discussed self care. Family stated they have Hospice contact information.    Total Visit Time: 60min  Face to Face Time: 40min    Justification for care:  Patient meets criteria for acute in-patient care with required nursing assessment and interventions for symptoms with IV medications.      Liss Guzmán, NEGRITO, MHA, APRN  [facility]  Hospice and Palliative Care Nurse Practitioner  02/11/20  3:04 PM

## 2020-02-12 NOTE — PROGRESS NOTES
Hospice Progress Note    Patient Name: Bettye Sargent   : 3/28/1927  Gender: female    Code Status: comfort measures    Date of Admission: 2/10/2020    Subjective:    89yoF admitted inpt Hospice 2/10 for respiratory failure.     Pt now on N5B.     Resting comfortably in bed; eyes half open - this is pt's norm. Pt warm to touch. Family at bedside.     : dilaudid, ativan decreased in frequency    - PRNs: dilaudid 0.5mg x2 - given with care - pt responsive to turning    - BM: ?  Increased urine output noted  (1090cc)      ROS:  Review of Systems   Unable to perform ROS: Patient unresponsive       Reviewed current scheduled and prn medications for route, type, dose and frequency.     •  acetaminophen  •  bisacodyl  •  fluticasone  •  glycopyrrolate  •  haloperidol lactate  •  HYDROmorphone **OR** HYDROmorphone  •  ketorolac  •  latanoprost  •  LORazepam **OR** LORazepam  •  palliative care oral rinse  •  Scopolamine  •  sodium chloride    Objective:   BP (!) 183/74 (BP Location: Right arm, Patient Position: Lying)   Pulse 85   Temp 97.5 °F (36.4 °C) (Axillary)   Resp 16   SpO2 92%    Intake & Output (last day)        0701 -  0700  07 -  0700    P.O. 0     Total Intake 0     Urine 1090     Stool 0     Total Output 1090     Net -1090           Stool Unmeasured Occurrence 0 x         Lab Results (last 24 hours)     ** No results found for the last 24 hours. **        Imaging Results (Last 24 Hours)     ** No results found for the last 24 hours. **          PPS: Palliative Performance Scale score as of 2020, 4:02 PM is 10% based on the following measures:   Ambulation: Totally bed bound  Activity and Evidence of Disease: Unable to do any work, extensive evidence of disease  Self-Care: Total care  Intake:  Mouth care only  LOC: Drowsy or coma        Physical Exam:  Physical Exam   Constitutional: She appears well-developed and well-nourished. No distress.   Flushed. Warm to touch.  Unresponsive. Comfortable. Wash cloth over forehead.   HENT:   Head: Normocephalic and atraumatic.   Eyes:   Eyes half open.    Neck: No tracheal deviation present.   Cardiovascular: Normal rate and regular rhythm.   Murmur (pansystolic III/VI) heard.  Pulmonary/Chest: Effort normal and breath sounds normal.   CTA. Even. No audible congestion.   Abdominal: Soft. Bowel sounds are normal.   Good BS x4   Musculoskeletal: She exhibits no edema.   Neurological:   Does not follow commands   Skin: Skin is dry. She is not diaphoretic. There is pallor.   Psychiatric:   No facial grimacing.           Respiratory failure (CMS/[facility])      Assessment/Plan:     89yoF admitted inpt Hospice 2/10 for respiratory failure.     - continue current plan of care    - monitor for changing needs    Discussion at bedside with loving family regarding end of life s/s, symptom mgmt, and timeframe. Support provided. Hospice contact information shared.     Total Visit Time: 60min  Face to Face Time: 40min    Justification for care:  Patient meets criteria for acute in-patient care with required nursing assessment and interventions for symptoms with IV medications.      Liss Guzmán, NEGRITO, MHA, SHALINI  [facility] Care Navigators  Hospice and Palliative Care Nurse Practitioner  02/12/20  4:02 PM

## 2020-02-12 NOTE — PLAN OF CARE
Problem: Patient Care Overview  Goal: Plan of Care Review  Outcome: Ongoing (interventions implemented as appropriate)  Flowsheets (Taken 2/12/2020 1472)  Outcome Summary: Patient only responsive to pain. Moans when turned. Family at bedside. Premedicating for turns.Toradol for fever. F/c in place draining large amounts. Education provided. Family denies further needs.

## 2020-02-12 NOTE — PLAN OF CARE
Problem: Patient Care Overview  Goal: Plan of Care Review  Outcome: Ongoing (interventions implemented as appropriate)  Flowsheets (Taken 2/12/2020 0401)  Progress: no change  Plan of Care Reviewed With: family  Outcome Summary: pt remains unresponsive , comfort measures continued

## 2020-02-12 NOTE — PROGRESS NOTES
2/12 PPS 10 % Patient is non-responsive this AM, daughter at bedside. No changes in her condition and pt appears very comfortable. Luque drained 1090 cc ric urine, breathe sounds diminished but clear and shallow, skin warm and dry. PRN dilaudid 0.5 mg x 1/ 24 hrs given. Patient meets criteria for acute in-patient care with required nursing assessment and interventions for symptoms with IV medications.

## 2020-02-13 NOTE — PLAN OF CARE
Problem: Patient Care Overview  Goal: Individualization and Mutuality  Outcome: Ongoing (interventions implemented as appropriate)     Problem: Patient Care Overview  Goal: Individualization and Mutuality  Outcome: Ongoing (interventions implemented as appropriate)     Problem: Patient Care Overview  Goal: Individualization and Mutuality  Outcome: Ongoing (interventions implemented as appropriate)     Problem: Patient Care Overview  Goal: Plan of Care Review  Flowsheets  Taken 2/12/2020 0401 by Luz Maria Chaney RN  Progress: no change  Plan of Care Reviewed With: family  Taken 2/12/2020 1645 by Ashanti Hi RN  Outcome Summary: Patient only responsive to pain. Moans when turned. Family at bedside. Premedicating for turns.Toradol for fever. F/c in place draining large amounts. Education provided. Family denies further needs.  Note:   Moaning, prn meds given, family struggling with process, pt comfortable most of shift, will continue to monitor

## 2020-02-14 NOTE — DISCHARGE SUMMARY
Date of Death: 2/14/2020  Time of Death:  0002    Presenting Problem/History of Present Illness    Respiratory failure (CMS/HCC)        Hospital Course    PerHospitalist admission HP:    Bettye Sargent is a 89 y.o. female with a past medical history of peripheral vascular disease, neuropathy, presents to the emergency department with complaints of shortness of air for the past 2 days.  CT the abdomen pelvis shows bilateral pleural effusions with a large right pleural effusion and a small left pleural effusion no acute abdominal or pelvic pathology.  CT of the chest shows bilateral pleural effusions larger on the right than the left, compressive atelectasis in both lower lung fields.  Labs show a hemoglobin of 7.1, hematocrit 25.6, and proBNP 56,909.  Upon arrival to the emergency department patient's blood pressure has been as high as 199/87 and she was started on a nitroglycerin drip.      Unfortunately pt continued to decline; ultimately family elected for comfort measures. Ms. Sargent was admitted to [facility] on 2/10/2020 for mgmt of acute symptoms 2/2 respiratory failure.       Social History:  Social History     Tobacco Use   • Smoking status: Former Smoker   • Smokeless tobacco: Never Used   Substance Use Topics   • Alcohol use: No     Frequency: Never         Consults:   Consults     Date and Time Order Name Status Description    2/9/2020 1136 Inpatient Palliative Care MD Consult Completed     2/9/2020 0230 Inpatient Gastroenterology Consult Completed           Exam confirms with auscultation zero audible heart tones and zero audible respirations. Ms.Aline Sargent was pronounced dead at 0002 2/14/2020.  MD notified by Patient's RN.     Juan Diego Roldan RN  Clinical House Supervisor  2/14/2020 12:32 AM    Liss Guzmán, NEGRITO, MHA, APRN  [facility]  Hospice and Palliative Care Nurse Practitioner  02/14/20  9:08 AM

## 2020-02-14 NOTE — SIGNIFICANT NOTE
Exam confirms with auscultation zero audible heart tones and zero audible respirations. Ms.Aline Sargent was pronounced dead at 0002 2/14/2020.  MD notified by Patient's RN.    Juan Diego Roldan RN  Clinical House Supervisor  2/14/2020 12:32 AM

## 2021-03-26 NOTE — PROGRESS NOTES
Discharge Planning Assessment  [facility]     Patient Name: Bettye Sargent  MRN: [id number]  Today's Date: 7/8/2019    Admit Date: 7/5/2019    Discharge Needs Assessment     Row Name 07/08/19 1432       Living Environment    Lives With  facility resident;alone    Name(s) of Who Lives With Patient  Resides @ [facility]    Current Living Arrangements  independent/assisted living facility    Primary Care Provided by  self; daughter, Dimple Singleton @ [phone]    Provides Primary Care For  no one    Family Caregiver if Needed  other (see comments)    Family Caregiver Names Family to arrange caregivers as needed; daughter/patient had been considering move from [facility] to [facility] in order to increase assistance with ADL's    Quality of Family Relationships helpful;involved;supportive    Living Arrangement Comments Currently resides at [facility]       Resource/Environmental Concerns    Resource/Environmental Concerns  none    Transportation Concerns  car, none       Transition Planning    Patient/Family Anticipates Transition to  inpatient rehabilitation facility;long term care facility    Patient/Family Anticipated Services at Transition      Transportation Anticipated  family or friend will provide;other (ambulance)       Discharge Needs Assessment    Readmission Within the Last 30 Days no previous admission in last 30 days    Concerns to be Addressed discharge planning    Concerns Comments Tentative discharge plan is [facility] for rehab, then will either return to [facility] vs. [facility]    Equipment Currently Used at Home walker, standard    Anticipated Changes Related to Illness  other (see comments)    Equipment Needed After Discharge  other (see comments)    Outpatient/Agency/Support Group Needs  inpatient rehabilitation facility    Discharge Facility/Level of Care Needs  rehabilitation facility    Offered/Gave Vendor List  no    Patient's Choice of Community Agency(s) [facility]; [facility] vs. [facility]    Current Discharge Risk dependent with mobility/activities of daily living    Discharge Coordination/Progress Anticipate transfer to [facility] when ready, per family request.  [facility]. [facility]        Discharge Plan     Row Name 07/08/19 5484       Plan    Plan [facility]/Rehab    Patient/Family in Agreement with Plan  -- Daughter    Plan Comments Remains in ICU, prior to hospitalization patient was residing in assisted living apartment @ [facility].  Spoke with daughter, Dimple Singleton ([phone]), by telephone.  Daughter reports patient was pretty mobile at [facility] prior to admission, wish for referral to be made to [facility] when medically ready for post hospital rehab.  Daughter reports they are considering move to [facility] vs. return to [facility] since, as she would receive more assistance at [facility] for ADL's.  Per daughter, patient has been very involved in these conversations regarding future living arrangements.  Plan PT/OT consults when appropriate, then referral to [facility].     Final Discharge Disposition Code 62 - inpatient rehab facility        Destination      No service coordination in this encounter.      Durable Medical Equipment      No service coordination in this encounter.      Dialysis/Infusion      No service coordination in this encounter.      Home Medical Care      No service coordination in this encounter.      Therapy      No service coordination in this encounter.      Community Resources      No service coordination in this encounter.          Demographic Summary     Row Name 07/08/19 8203       General Information    Admission Type  inpatient    Arrived From  emergency department;home    Referral Source  admission list;interdisciplinary rounds    Reason for Consult  discharge planning    Preferred Language  English      Used During This Interaction  no        Functional Status    No documentation.       Psychosocial    No documentation.       Abuse/Neglect    No documentation.       Legal    No documentation.       Substance Abuse    No documentation.       Patient Forms    No documentation.           MATTHEW Hayes     Home

## 2023-05-30 NOTE — PROGRESS NOTES
May 30, 2023     Patient: Mayra Bright   YOB: 2006   Date of Visit: 5/30/2023       To Whom it May Concern:    Mayra Bright was seen in my clinic on 5/30/2023 at 11:30 am.     She received the meningococcal vaccine (Menveo) in the office today.      Sincerely,         Rosalia Mckeon MD    Medical information is confidential and cannot be disclosed without the written consent of the patient or her representative.       Multidisciplinary Rounds    Patient Name: Bettye Sargent  Date of Encounter: 07/10/19 9:14 AM  MRN: [id number]  Admission date: 7/5/2019    Reason for visit: MDR. RD to continue to follow per protocol.     Additional information obtained during MDR:  Overall improving.  Fair appetite with limited food prefs. Tolerating solid food.  Awaiting transfer to telemetry.      Family reports patient does not like RFB.      Current diet: Diet Regular   Dietary Nutrition Supplements Boost Breeze (Clear Liquid); orange TID           Evaluation of Received Nutrient/Fluid Intake:  1 Day: 50% x 1 meal     EMR reviewed     Intervention:  Follow treatment plan  Care plan reviewed  Review menu options  Adjust supplement to Magic cup BID     Follow up:   Per protocol      Mayte Roca RD  9:14 AM  Time: 10min

## 2024-01-08 NOTE — PROGRESS NOTES
CTS Progress Note       LOS: 7 days   Patient Care Team:  Anand Akins MD as PCP - General (Family Medicine)    Chief Complaint: <principal problem not specified>    Vital Signs:  Temp:  [97.6 °F (36.4 °C)-98.4 °F (36.9 °C)] 98 °F (36.7 °C)  Heart Rate:  [68-82] 68  Resp:  [18] 18  BP: (169-185)/(69-88) 178/69    Physical Exam:       Results:   Results from last 7 days   Lab Units 07/09/19  0341   WBC 10*3/mm3 19.08*   HEMOGLOBIN g/dL 8.4*   HEMATOCRIT % 29.4*   PLATELETS 10*3/mm3 226     Results from last 7 days   Lab Units 07/12/19  0422   SODIUM mmol/L 137   POTASSIUM mmol/L 4.7   CHLORIDE mmol/L 103   CO2 mmol/L 21.0*   BUN mg/dL 32*   CREATININE mg/dL 1.27*   GLUCOSE mg/dL 150*   CALCIUM mg/dL 9.2           Imaging Results (last 24 hours)     ** No results found for the last 24 hours. **          Assessment      Acute ischemic leg, s/p thrombectomy (Dr. Dillon, 7/5/19)    Renal insufficiency, acute versus chronic    Chronic Tachycardia    Anemia    Arterial occlusion, lower extremity (CMS/HCC)    RLL Infiltrate (R/O Aspiration pneumonia)        Plan   May transfer to the [facility] anytime from my standpoint.  Recommend no narcotics at discharge    Please note that portions of this note were completed with a voice recognition program. Efforts were made to edit the dictations, but occasionally words are mistranscribed.    Candido Dillon MD  07/12/19  7:25 AM       Progress Note  PULMONARY    Admit Date: 2024  History of Present Illness:  Pt is a 74 yo female with COPD, HTN, GERD, solorzano esophagus, anxiety and hypothyroidism who presented to the ED with dyspnea. She is being treated for influenza A and COPD exacerbation. Pulmonary is consulted for lung nodule. Pt c/o flu like symptoms onset  with weakness and fatigue. She went to the ED on wed and was sent home with tamiflu and nebulizer treatments. Fri 4am pt woke up unable to breathe so returned to ED. She is feeling slightly improved now but still wheezing and requiring nc oxygen. She is a smoker, 1/2 ppd since age 16. She has had one episode of bronchitis in past w/ wheezing but denies any dx of copd. Since having covid 3 mos ago she has noticed more respiratory difficulties. She is able to be active climbing steps [place] [employer] [place]. 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 Her sister had lung disease due to smoking,  from pancreatic cancer. Pt states she had prior nodule on her lung 5 yrs ago.     SUBJECTIVE:     - no new complaints, sats improved- on room air, no desaturation with ambulation today      OBJECTIVE:     Vitals (Most recent):  Vitals:    24 0722   BP: (!) 149/74   Pulse: 62   Resp: 18   Temp: 98.3 °F (36.8 °C)       Vitals (24 hour range):  Temp:  [97.6 °F (36.4 °C)-98.3 °F (36.8 °C)]   Pulse:  [56-69]   Resp:  [16-18]   BP: (129-154)/(74-95)   SpO2:  [94 %-100 %]       Intake/Output Summary (Last 24 hours) at 2024 0901  Last data filed at 2024 0645  Gross per 24 hour   Intake 0 ml   Output 650 ml   Net -650 ml          Physical Exam:  The patient's neuro status (alertness,orientation,cognitive function,motor skills,), pharyngeal exam (oral lesions, hygiene, abn dentition,), Neck (jvd,mass,thyroid,nodes in neck and above/below clavicle),RESPIRATORY(symmetry,effort,fremitus,percussion,auscultation),  Cor(rhythm,heart tones including  "gallops,perfusion,edema)ABD(distention,hepatic&splenomegaly,tenderness,masses), Skin(rash,cyanosis),Psyc(affect,judgement,).  Exam negative except for these pertinent findings:    Awake, alert, no distress  Very pleasant and talkative  HR regular  Stable bilat wheezes  Abd soft nontender  No edema/clubbing     Radiographs reviewed: view by direct vision   CT chest 1/7/24- ELOY lobulated lung nodule 1.8cm growing compared to 6/2023 when it was 3mm       Labs     Recent Labs   Lab 01/08/24  0415   WBC 3.10*   HGB 12.7   HCT 38.9   *     Recent Labs   Lab 01/07/24  1251 01/08/24  0415   NA  --  140   K  --  3.8   CL  --  105   CO2  --  25   BUN  --  8   CREATININE  --  0.8   GLU  --  87   CALCIUM  --  8.6*   MG  --  1.9   PHOS  --  2.1*   PROCAL 0.04  --    No results for input(s): "PH", "PCO2", "PO2", "HCO3" in the last 24 hours.  Microbiology Results (last 7 days)       ** No results found for the last 168 hours. **            Impression:  Active Hospital Problems    Diagnosis  POA    *COPD exacerbation [J44.1]  Yes    Mass of upper lobe of left lung [R91.8]  Yes    Acute hypoxic respiratory failure [J96.01]  Yes    Influenza A [J10.1]  Yes    Acquired hypothyroidism [E03.9]  Yes    Essential hypertension [I10]  Yes    Anxiety [F41.9]  Yes     Chronic    Tobacco abuse [Z72.0]  Yes     Chronic      Resolved Hospital Problems   No resolved problems to display.               Plan:   Influenza with acute bronchitis  Possible COPD  RLL atelectasis  Smoker  Left upper lobe lung nodule concerning for malignancy  Leukopenia  thrombocytopenia     - continue inhaled bronchodilators  - off O2  - continue tamiflu 5d course  - antibiotic has been deescalated  - stable for dc home  - pt has trelegy inhaler at home and will resume on dc, use albuterol as needed  - ELOY nodule needs biopsy outpatient after acute illness resolved- I will order and f/u      Alyx Hilton MD  Pulmonary & Critical Care Medicine    "

## (undated) DEVICE — ANTIBACTERIAL UNDYED BRAIDED (POLYGLACTIN 910), SYNTHETIC ABSORBABLE SUTURE: Brand: COATED VICRYL

## (undated) DEVICE — GW RDRUN XCHG FRM STD STFF .035 65X260

## (undated) DEVICE — GW THRUWAY .018 190CM

## (undated) DEVICE — SUT SILK 2/0 SH 30IN K833H

## (undated) DEVICE — AVANTI + 5F STD W/GW: Brand: AVANTI

## (undated) DEVICE — DECANT BG O JET

## (undated) DEVICE — RADIFOCUS GLIDECATH: Brand: GLIDECATH

## (undated) DEVICE — SKIN AFFIX SURG ADHESIVE 72/CS 0.55ML: Brand: MEDLINE

## (undated) DEVICE — FOGARTY - HYDRAGRIP SURGICAL - CLAMP INSERTS: Brand: FOGARTY SOFTJAW

## (undated) DEVICE — 3M™ IOBAN™ 2 ANTIMICROBIAL INCISE DRAPE 6651EZ: Brand: IOBAN™ 2

## (undated) DEVICE — SUT SILK 2/0 TIES 18IN A185H

## (undated) DEVICE — FOGARTY THRU-LUMEN EMBOLECTOMY CATHETER 3F 80CM: Brand: FOGARTY

## (undated) DEVICE — BALN EVERCROSS OTW .035 5F 6X80MM 80CM

## (undated) DEVICE — FOGARTY THRU-LUMEN EMBOLECTOMY CATHETER 4F 80CM: Brand: FOGARTY

## (undated) DEVICE — BALN EVERCROSS OTW .035 6F 8X80MM 80CM

## (undated) DEVICE — CVR HNDL LT SURG ACCSSRY BLU STRL

## (undated) DEVICE — SUT SILK 4/0 TIES 18IN A183H

## (undated) DEVICE — BALN EVERCROSS OTW .035 5F 6X80 135

## (undated) DEVICE — GLV SURG BIOGEL LTX PF 7 1/2

## (undated) DEVICE — GLV SURG BIOGEL LTX PF 8

## (undated) DEVICE — GLIDEX™ COATED HYDROPHILIC GUIDEWIRE: Brand: MAGIC TORQUE™

## (undated) DEVICE — SUCTION CANISTER, 2500CC, RIGID: Brand: DEROYAL

## (undated) DEVICE — SUT PROLN 6/0 C1 D/A 30IN 8706H

## (undated) DEVICE — SI AVANTI+ 8F STD W/GW  NO OBT: Brand: AVANTI

## (undated) DEVICE — PK VASC 10

## (undated) DEVICE — INFLATION DEVICE: Brand: ENCORE 26